# Patient Record
Sex: FEMALE | Race: WHITE | NOT HISPANIC OR LATINO | Employment: FULL TIME | URBAN - METROPOLITAN AREA
[De-identification: names, ages, dates, MRNs, and addresses within clinical notes are randomized per-mention and may not be internally consistent; named-entity substitution may affect disease eponyms.]

---

## 2017-10-20 PROCEDURE — 87340 HEPATITIS B SURFACE AG IA: CPT | Performed by: OBSTETRICS & GYNECOLOGY

## 2017-10-20 PROCEDURE — 87389 HIV-1 AG W/HIV-1&-2 AB AG IA: CPT | Performed by: OBSTETRICS & GYNECOLOGY

## 2017-10-20 PROCEDURE — 36415 COLL VENOUS BLD VENIPUNCTURE: CPT | Performed by: OBSTETRICS & GYNECOLOGY

## 2017-10-20 PROCEDURE — 86803 HEPATITIS C AB TEST: CPT | Performed by: OBSTETRICS & GYNECOLOGY

## 2017-10-20 PROCEDURE — 86780 TREPONEMA PALLIDUM: CPT | Performed by: OBSTETRICS & GYNECOLOGY

## 2017-10-21 LAB — T PALLIDUM IGG+IGM SER QL: NEGATIVE

## 2017-10-23 LAB
HBV SURFACE AG SERPL QL IA: NONREACTIVE
HCV AB SERPL QL IA: NONREACTIVE
HIV 1+2 AB+HIV1 P24 AG SERPL QL IA: NONREACTIVE

## 2017-12-27 ENCOUNTER — AMBULATORY - HEALTHEAST (OUTPATIENT)
Dept: MULTI SPECIALTY CLINIC | Facility: CLINIC | Age: 22
End: 2017-12-27

## 2017-12-27 LAB — PAP SMEAR - HIM PATIENT REPORTED: NORMAL

## 2018-04-29 ENCOUNTER — RECORDS - HEALTHEAST (OUTPATIENT)
Dept: ADMINISTRATIVE | Facility: OTHER | Age: 23
End: 2018-04-29

## 2018-05-14 ENCOUNTER — RECORDS - HEALTHEAST (OUTPATIENT)
Dept: ADMINISTRATIVE | Facility: OTHER | Age: 23
End: 2018-05-14

## 2018-08-08 ENCOUNTER — RECORDS - HEALTHEAST (OUTPATIENT)
Dept: ADMINISTRATIVE | Facility: OTHER | Age: 23
End: 2018-08-08

## 2018-12-20 ENCOUNTER — RECORDS - HEALTHEAST (OUTPATIENT)
Dept: ADMINISTRATIVE | Facility: OTHER | Age: 23
End: 2018-12-20

## 2018-12-29 ENCOUNTER — RECORDS - HEALTHEAST (OUTPATIENT)
Dept: ADMINISTRATIVE | Facility: OTHER | Age: 23
End: 2018-12-29

## 2019-04-15 ENCOUNTER — OFFICE VISIT - HEALTHEAST (OUTPATIENT)
Dept: FAMILY MEDICINE | Facility: CLINIC | Age: 24
End: 2019-04-15

## 2019-04-15 DIAGNOSIS — F41.8 OTHER SPECIFIED ANXIETY DISORDERS: ICD-10-CM

## 2019-04-15 DIAGNOSIS — K30 FUNCTIONAL DYSPEPSIA: ICD-10-CM

## 2019-04-15 DIAGNOSIS — K31.84 GASTROPARESIS: ICD-10-CM

## 2019-04-15 DIAGNOSIS — Z11.3 SCREEN FOR STD (SEXUALLY TRANSMITTED DISEASE): ICD-10-CM

## 2019-04-15 DIAGNOSIS — Z00.00 VISIT FOR PREVENTIVE HEALTH EXAMINATION: ICD-10-CM

## 2019-04-15 DIAGNOSIS — Z86.39 HISTORY OF IRON DEFICIENCY: ICD-10-CM

## 2019-04-15 DIAGNOSIS — K58.1 IRRITABLE BOWEL SYNDROME WITH CONSTIPATION: ICD-10-CM

## 2019-04-15 DIAGNOSIS — R11.0 NAUSEA: ICD-10-CM

## 2019-04-15 DIAGNOSIS — J45.21 MILD INTERMITTENT ASTHMA WITH ACUTE EXACERBATION: ICD-10-CM

## 2019-04-15 DIAGNOSIS — G43.009 MIGRAINE WITHOUT AURA AND WITHOUT STATUS MIGRAINOSUS, NOT INTRACTABLE: ICD-10-CM

## 2019-04-15 LAB
CLUE CELLS: NORMAL
TRICHOMONAS, WET PREP: NORMAL
YEAST, WET PREP: NORMAL

## 2019-04-15 RX ORDER — ONDANSETRON 8 MG/1
TABLET, FILM COATED ORAL
Qty: 180 TABLET | Refills: 0 | Status: SHIPPED | OUTPATIENT
Start: 2019-04-15 | End: 2024-02-01

## 2019-04-15 RX ORDER — VITAMIN B COMPLEX
1 CAPSULE ORAL DAILY
Status: ON HOLD | COMMUNITY
Start: 2019-04-15 | End: 2024-09-27

## 2019-04-15 ASSESSMENT — MIFFLIN-ST. JEOR: SCORE: 1146.43

## 2019-04-17 ENCOUNTER — RECORDS - HEALTHEAST (OUTPATIENT)
Dept: ADMINISTRATIVE | Facility: OTHER | Age: 24
End: 2019-04-17

## 2019-04-17 LAB
C TRACH DNA SPEC QL PROBE+SIG AMP: NEGATIVE
N GONORRHOEA DNA SPEC QL NAA+PROBE: NEGATIVE

## 2019-04-19 ENCOUNTER — COMMUNICATION - HEALTHEAST (OUTPATIENT)
Dept: FAMILY MEDICINE | Facility: CLINIC | Age: 24
End: 2019-04-19

## 2019-04-29 ENCOUNTER — OFFICE VISIT - HEALTHEAST (OUTPATIENT)
Dept: FAMILY MEDICINE | Facility: CLINIC | Age: 24
End: 2019-04-29

## 2019-04-29 DIAGNOSIS — Z86.39 HISTORY OF IRON DEFICIENCY: ICD-10-CM

## 2019-04-29 DIAGNOSIS — F41.8 OTHER SPECIFIED ANXIETY DISORDERS: ICD-10-CM

## 2019-04-29 DIAGNOSIS — Z00.00 VISIT FOR PREVENTIVE HEALTH EXAMINATION: ICD-10-CM

## 2019-04-29 DIAGNOSIS — Z79.899 CONTROLLED SUBSTANCE AGREEMENT SIGNED: ICD-10-CM

## 2019-04-29 LAB
AMPHETAMINES UR QL SCN: ABNORMAL
BARBITURATES UR QL: ABNORMAL
BENZODIAZ UR QL: ABNORMAL
CANNABINOIDS UR QL SCN: ABNORMAL
COCAINE UR QL: ABNORMAL
CREAT UR-MCNC: 163.2 MG/DL
HIV 1+2 AB+HIV1 P24 AG SERPL QL IA: NEGATIVE
METHADONE UR QL SCN: ABNORMAL
OPIATES UR QL SCN: ABNORMAL
OXYCODONE UR QL: ABNORMAL
PCP UR QL SCN: ABNORMAL

## 2019-04-30 LAB
BASOPHILS # BLD AUTO: NORMAL THOU/UL (ref 0–0.2)
BASOPHILS NFR BLD AUTO: NORMAL % (ref 0–2)
EOSINOPHIL # BLD AUTO: NORMAL THOU/UL (ref 0–0.4)
EOSINOPHIL NFR BLD AUTO: NORMAL % (ref 0–6)
ERYTHROCYTE [DISTWIDTH] IN BLOOD BY AUTOMATED COUNT: NORMAL % (ref 11–14.5)
HCT VFR BLD AUTO: NORMAL % (ref 35–47)
HGB BLD-MCNC: NORMAL G/DL (ref 12–16)
LYMPHOCYTES # BLD AUTO: NORMAL THOU/UL (ref 0.8–4.4)
LYMPHOCYTES NFR BLD AUTO: NORMAL % (ref 20–40)
MCH RBC QN AUTO: NORMAL PG (ref 27–34)
MCHC RBC AUTO-ENTMCNC: NORMAL G/DL (ref 32–36)
MCV RBC AUTO: NORMAL FL (ref 80–100)
MONOCYTES # BLD AUTO: NORMAL THOU/UL (ref 0–0.9)
MONOCYTES NFR BLD AUTO: NORMAL % (ref 2–10)
NEUTROPHILS # BLD AUTO: NORMAL THOU/UL (ref 2–7.7)
NEUTROPHILS NFR BLD AUTO: NORMAL % (ref 50–70)
PLATELET # BLD AUTO: NORMAL THOU/UL (ref 140–440)
PMV BLD AUTO: NORMAL FL (ref 7–10)
RBC # BLD AUTO: NORMAL MILL/UL (ref 3.8–5.4)
WBC: NORMAL THOU/UL (ref 4–11)

## 2019-05-13 ENCOUNTER — OFFICE VISIT - HEALTHEAST (OUTPATIENT)
Dept: FAMILY MEDICINE | Facility: CLINIC | Age: 24
End: 2019-05-13

## 2019-05-13 DIAGNOSIS — F41.8 OTHER SPECIFIED ANXIETY DISORDERS: ICD-10-CM

## 2019-05-13 DIAGNOSIS — Z91.89 AT RISK FOR INCREASED ANXIETY: ICD-10-CM

## 2019-05-13 LAB
ALBUMIN SERPL-MCNC: 4.2 G/DL (ref 3.5–5)
ALP SERPL-CCNC: 49 U/L (ref 45–120)
ALT SERPL W P-5'-P-CCNC: 10 U/L (ref 0–45)
ANION GAP SERPL CALCULATED.3IONS-SCNC: 9 MMOL/L (ref 5–18)
AST SERPL W P-5'-P-CCNC: 15 U/L (ref 0–40)
BILIRUB SERPL-MCNC: 0.4 MG/DL (ref 0–1)
BUN SERPL-MCNC: 7 MG/DL (ref 8–22)
CALCIUM SERPL-MCNC: 9.8 MG/DL (ref 8.5–10.5)
CHLORIDE BLD-SCNC: 106 MMOL/L (ref 98–107)
CO2 SERPL-SCNC: 24 MMOL/L (ref 22–31)
CREAT SERPL-MCNC: 0.77 MG/DL (ref 0.6–1.1)
GFR SERPL CREATININE-BSD FRML MDRD: >60 ML/MIN/1.73M2
GLUCOSE BLD-MCNC: 84 MG/DL (ref 70–125)
POTASSIUM BLD-SCNC: 4.6 MMOL/L (ref 3.5–5)
PROT SERPL-MCNC: 7 G/DL (ref 6–8)
SODIUM SERPL-SCNC: 139 MMOL/L (ref 136–145)
TSH SERPL DL<=0.005 MIU/L-ACNC: 2.81 UIU/ML (ref 0.3–5)

## 2019-05-24 ENCOUNTER — OFFICE VISIT - HEALTHEAST (OUTPATIENT)
Dept: FAMILY MEDICINE | Facility: CLINIC | Age: 24
End: 2019-05-24

## 2019-05-24 DIAGNOSIS — F41.8 OTHER SPECIFIED ANXIETY DISORDERS: ICD-10-CM

## 2019-05-24 RX ORDER — DULOXETIN HYDROCHLORIDE 20 MG/1
20 CAPSULE, DELAYED RELEASE ORAL 2 TIMES DAILY
Status: SHIPPED | COMMUNITY
Start: 2019-05-24 | End: 2023-12-05 | Stop reason: DRUGHIGH

## 2019-08-10 ENCOUNTER — COMMUNICATION - HEALTHEAST (OUTPATIENT)
Dept: FAMILY MEDICINE | Facility: CLINIC | Age: 24
End: 2019-08-10

## 2019-08-10 DIAGNOSIS — F41.8 OTHER SPECIFIED ANXIETY DISORDERS: ICD-10-CM

## 2019-08-12 ENCOUNTER — COMMUNICATION - HEALTHEAST (OUTPATIENT)
Dept: FAMILY MEDICINE | Facility: CLINIC | Age: 24
End: 2019-08-12

## 2019-08-12 DIAGNOSIS — F41.8 OTHER SPECIFIED ANXIETY DISORDERS: ICD-10-CM

## 2019-08-12 DIAGNOSIS — Z30.41 ORAL CONTRACEPTIVE PILL SURVEILLANCE: ICD-10-CM

## 2019-08-16 RX ORDER — BUPROPION HYDROCHLORIDE 150 MG/1
150 TABLET ORAL EVERY MORNING
Qty: 90 TABLET | Refills: 0 | Status: SHIPPED | OUTPATIENT
Start: 2019-08-16 | End: 2024-03-05

## 2019-12-06 ENCOUNTER — RECORDS - HEALTHEAST (OUTPATIENT)
Dept: ADMINISTRATIVE | Facility: OTHER | Age: 24
End: 2019-12-06

## 2019-12-31 ENCOUNTER — COMMUNICATION - HEALTHEAST (OUTPATIENT)
Dept: FAMILY MEDICINE | Facility: CLINIC | Age: 24
End: 2019-12-31

## 2019-12-31 DIAGNOSIS — Z30.41 ORAL CONTRACEPTIVE PILL SURVEILLANCE: ICD-10-CM

## 2020-01-17 ENCOUNTER — COMMUNICATION - HEALTHEAST (OUTPATIENT)
Dept: FAMILY MEDICINE | Facility: CLINIC | Age: 25
End: 2020-01-17

## 2020-01-17 DIAGNOSIS — K58.1 IRRITABLE BOWEL SYNDROME WITH CONSTIPATION: ICD-10-CM

## 2020-01-17 RX ORDER — POLYETHYLENE GLYCOL 3350 17 G/17G
17 POWDER, FOR SOLUTION ORAL DAILY PRN
Qty: 1,530 G | Refills: 1 | Status: ON HOLD | OUTPATIENT
Start: 2020-01-17 | End: 2024-09-27

## 2020-02-02 ENCOUNTER — COMMUNICATION - HEALTHEAST (OUTPATIENT)
Dept: FAMILY MEDICINE | Facility: CLINIC | Age: 25
End: 2020-02-02

## 2020-02-02 DIAGNOSIS — F41.8 OTHER SPECIFIED ANXIETY DISORDERS: ICD-10-CM

## 2020-02-03 RX ORDER — BUPROPION HYDROCHLORIDE 300 MG/1
TABLET ORAL
Qty: 90 TABLET | Refills: 3 | Status: SHIPPED | OUTPATIENT
Start: 2020-02-03 | End: 2024-03-05

## 2020-02-14 ENCOUNTER — OFFICE VISIT - HEALTHEAST (OUTPATIENT)
Dept: FAMILY MEDICINE | Facility: CLINIC | Age: 25
End: 2020-02-14

## 2020-02-14 DIAGNOSIS — N93.9 VAGINAL BLEEDING: ICD-10-CM

## 2020-02-14 DIAGNOSIS — R35.0 INCREASED URINARY FREQUENCY: ICD-10-CM

## 2020-02-14 DIAGNOSIS — K58.1 IRRITABLE BOWEL SYNDROME WITH CONSTIPATION: ICD-10-CM

## 2020-02-14 DIAGNOSIS — R10.2 SUPRAPUBIC ABDOMINAL PAIN: ICD-10-CM

## 2020-02-14 DIAGNOSIS — F32.1 MODERATE MAJOR DEPRESSION (H): ICD-10-CM

## 2020-02-14 DIAGNOSIS — K31.84 GASTROPARESIS: ICD-10-CM

## 2020-02-14 DIAGNOSIS — K30 FUNCTIONAL DYSPEPSIA: ICD-10-CM

## 2020-02-14 LAB
ALBUMIN UR-MCNC: NEGATIVE MG/DL
AMORPH CRY #/AREA URNS HPF: ABNORMAL /[HPF]
APPEARANCE UR: CLEAR
BACTERIA #/AREA URNS HPF: ABNORMAL HPF
BILIRUB UR QL STRIP: NEGATIVE
COLOR UR AUTO: YELLOW
GLUCOSE UR STRIP-MCNC: NEGATIVE MG/DL
HCG SERPL-ACNC: <2 MLU/ML (ref 0–4)
HGB UR QL STRIP: NEGATIVE
KETONES UR STRIP-MCNC: NEGATIVE MG/DL
LEUKOCYTE ESTERASE UR QL STRIP: ABNORMAL
NITRATE UR QL: NEGATIVE
PH UR STRIP: 7 [PH] (ref 5–8)
RBC #/AREA URNS AUTO: ABNORMAL HPF
SP GR UR STRIP: 1.02 (ref 1–1.03)
SQUAMOUS #/AREA URNS AUTO: ABNORMAL LPF
UROBILINOGEN UR STRIP-ACNC: ABNORMAL
WBC #/AREA URNS AUTO: ABNORMAL HPF

## 2020-02-14 RX ORDER — TRAZODONE HYDROCHLORIDE 50 MG/1
50-100 TABLET, FILM COATED ORAL
Status: SHIPPED | COMMUNITY
Start: 2020-02-14 | End: 2024-02-01

## 2020-02-14 RX ORDER — DEXTROAMPHETAMINE SACCHARATE, AMPHETAMINE ASPARTATE, DEXTROAMPHETAMINE SULFATE AND AMPHETAMINE SULFATE 5; 5; 5; 5 MG/1; MG/1; MG/1; MG/1
20 TABLET ORAL DAILY
Status: SHIPPED | COMMUNITY
Start: 2020-02-14 | End: 2022-04-11

## 2020-02-14 ASSESSMENT — ANXIETY QUESTIONNAIRES
1. FEELING NERVOUS, ANXIOUS, OR ON EDGE: NEARLY EVERY DAY
6. BECOMING EASILY ANNOYED OR IRRITABLE: NEARLY EVERY DAY
2. NOT BEING ABLE TO STOP OR CONTROL WORRYING: NEARLY EVERY DAY
5. BEING SO RESTLESS THAT IT IS HARD TO SIT STILL: NEARLY EVERY DAY
4. TROUBLE RELAXING: NEARLY EVERY DAY
7. FEELING AFRAID AS IF SOMETHING AWFUL MIGHT HAPPEN: MORE THAN HALF THE DAYS
IF YOU CHECKED OFF ANY PROBLEMS ON THIS QUESTIONNAIRE, HOW DIFFICULT HAVE THESE PROBLEMS MADE IT FOR YOU TO DO YOUR WORK, TAKE CARE OF THINGS AT HOME, OR GET ALONG WITH OTHER PEOPLE: VERY DIFFICULT
3. WORRYING TOO MUCH ABOUT DIFFERENT THINGS: NEARLY EVERY DAY
GAD7 TOTAL SCORE: 20

## 2020-02-14 ASSESSMENT — MIFFLIN-ST. JEOR: SCORE: 1199.14

## 2020-02-14 ASSESSMENT — PATIENT HEALTH QUESTIONNAIRE - PHQ9: SUM OF ALL RESPONSES TO PHQ QUESTIONS 1-9: 17

## 2020-02-15 LAB — BACTERIA SPEC CULT: NO GROWTH

## 2020-03-16 ENCOUNTER — COMMUNICATION - HEALTHEAST (OUTPATIENT)
Dept: FAMILY MEDICINE | Facility: CLINIC | Age: 25
End: 2020-03-16

## 2020-03-16 DIAGNOSIS — Z30.41 ORAL CONTRACEPTIVE PILL SURVEILLANCE: ICD-10-CM

## 2021-04-06 ENCOUNTER — COMMUNICATION - HEALTHEAST (OUTPATIENT)
Dept: FAMILY MEDICINE | Facility: CLINIC | Age: 26
End: 2021-04-06

## 2021-04-06 DIAGNOSIS — Z30.41 ORAL CONTRACEPTIVE PILL SURVEILLANCE: ICD-10-CM

## 2021-04-06 RX ORDER — NORETHINDRONE ACETATE AND ETHINYL ESTRADIOL, ETHINYL ESTRADIOL AND FERROUS FUMARATE 1MG-10(24)
1 KIT ORAL DAILY
Qty: 84 TABLET | Refills: 0 | Status: SHIPPED | OUTPATIENT
Start: 2021-04-06 | End: 2021-08-20

## 2021-05-27 ASSESSMENT — PATIENT HEALTH QUESTIONNAIRE - PHQ9: SUM OF ALL RESPONSES TO PHQ QUESTIONS 1-9: 17

## 2021-05-27 NOTE — PROGRESS NOTES
FEMALE ADULT PREVENTIVE EXAM      ASSESSMENT & PLAN  Brianna was seen today for annual exam.    Diagnoses and all orders for this visit:    Visit for preventive health examination  Routine history and physical, updated in EMR. Will obtain records from previous PCP in [facility] and review. Patient will return for fasting lab work in near future.   -     Cancel: HIV Antigen/Antibody Screening [place]  -     Cancel: Lipid [place] FASTING  -     Cancel: Comprehensive Metabolic Panel  -     Cancel: HM1(CBC and Differential)  -     Chlamydia trachomatis & Neisseria gonorrhoeae, Amplified Detection  -     Cancel: Syphilis Screen, [place]  -     Cancel: HM1 (CBC with Diff)  -     Comprehensive Metabolic Panel; Future  -     HIV Antigen/Antibody Screening [place]; Future  -     Syphilis Screen, [place]; Future  -     Wet Prep, Vaginal  -     Lipid [place] FASTING; Future    Mild intermittent asthma with acute exacerbation  Childhood. No longer bothersome to her    Functional dyspepsia  Gastroparesis  Irritable bowel syndrome with constipation  Has had EGD and colonoscopy dated 2011 per careeverywhere  - normal biopsies. Gastric emptying study in 2010 with borderline delayed gastric emptying and has been on Zofran 8 mg two times a day for years. She needs to establish care with GI. Gave her Zofran refill today but I'm weary of chronic zofran presription. Hopefully GI would give some advice.   -     Ambulatory referral to Gastroenterology  -     Comprehensive Metabolic Panel; Future    Other specified anxiety disorders  Has seen psych in the past but declines to see psych currently. Her previous PCP in [place] prescribed wellbutrin and Xanax for her. She needs to come back for Xanax discussion and CSA and drug screen in 2 weeks when her Xanax runs out.   -     buPROPion (WELLBUTRIN XL) 150 MG 24 hr tablet; Take 1 tablet (150 mg total) by mouth every morning.    Migraine without aura and without status migrainosus, not  intractable  Used to get Fioricet from [place]. Now takes Excedrin migraine.     Nausea  -     ondansetron (ZOFRAN) 8 MG tablet; take 1 tablet by oral route  twice daily  -     Ambulatory referral to Gastroenterology  -     Comprehensive Metabolic Panel; Future    History of iron deficiency  -     Ferritin; Future  -     Iron and Transferrin Iron Binding Capacity; Future  -     HM1(CBC and Differential); Future    Screen for STD (sexually transmitted disease)  -     Chlamydia trachomatis & Neisseria gonorrhoeae, Amplified Detection  -     Wet Prep, Vaginal        This note was created using Dragon dictation software, spelling errors may occur.    Radha Justin MD      CHIEF COMPLAINT:  Female preventive exam.    SUBJECTIVE:  Brianna Diaz is a 23 y.o. female who is new to me who is here for annual physical. She lives in the [facility] but traveled down to [place], [place] every 5 months to see her doctor there for medication refill. She has been getting Zofran, wellbutrin and xanax from Dr. Corey at [facility] in [place]. Her insurance now finally covers for her to see a doctor here in the [facility].  Her last pap smear is sep or oct of 2018. She hasn't seen psychiatry for many years. Used to be on Abilify but now anxiety is controlled with Wellbutrin and Xanax. Been using xanax 0.25 mg two times a day. Has seen GI in [place] but has yet to establish care with GI here in the [facility]. Not fasting today.     She last saw me Visit date not found.    She  has a past medical history of Anxiety and IBS (irritable bowel syndrome).    No results found for: WBC, HGB, HCT, MCV, PLT, NA, K, BUN  No results found for: CHOL, HDL, LDLCALC, TRIG  No results found for: TSH  BP Readings from Last 3 Encounters:   04/15/19 130/89       Surgeries:  History reviewed. No pertinent surgical history.    Family History:  Her family history is not on file. She was adopted.    Social History:  She  reports that she  has never smoked. She has never used smokeless tobacco. She reports that she drinks alcohol. She reports that she does not use drugs.   Social History     Social History Narrative    Engaged.     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 Works part time at [employer].       Medications:    Current Outpatient Medications:      ALPRAZolam (XANAX) 0.25 MG tablet, Take 0.25 mg by mouth 2 (two) times a day., Disp: , Rfl:      aspirin-acetaminophen-caffeine (EXCEDRIN MIGRAINE) 250-250-65 mg per tablet, take 1 tab as needed, Disp: , Rfl:      b complex vitamins capsule, Take 1 capsule by mouth daily., Disp: , Rfl:      buPROPion (WELLBUTRIN XL) 150 MG 24 hr tablet, Take 1 tablet (150 mg total) by mouth every morning., Disp: 90 tablet, Rfl: 0     buPROPion (WELLBUTRIN XL) 300 MG 24 hr tablet, Take 1 tablet by mouth., Disp: , Rfl:      FERROUS SULFATE ORAL, Take 1 tablet by mouth daily., Disp: , Rfl:      norethindrone-e.estradiol-iron (LO LOESTRIN FE) 1 mg-10 mcg (24)/10 mcg (2) Tab, Take 1 tablet by mouth., Disp: , Rfl:      ondansetron (ZOFRAN) 8 MG tablet, take 1 tablet by oral route  twice daily, Disp: 180 tablet, Rfl: 0     polyethylene glycol (MIRALAX) 17 gram/dose powder, Take by mouth., Disp: , Rfl:      ubidecarenone/vitamin E mixed (COENZYME Q10-VITAMIN E) 100 mg- 10 unit cap, take 1 capsule daily, Disp: , Rfl:   HELD MEDICATIONS: None.    Allergies:  No latex allergies.  Allergies   Allergen Reactions     Oxycodone-Acetaminophen Nausea Only     Any pain meds make her sick and cause nausea     Bactrim [Sulfamethoxazole-Trimethoprim] Rash       RISK BEHAVIOR & HEALTH HABITS  History of abnormal pap smear: No abnormal  Date of previous pap smear: Sep or Oct 2018.  Mammography: n/a.  Self Breast Exam: n/a.  Colon/Flex Sig: has had colonoscopies for work up of IBS and gastroparesis in the past.  DEXA: n/a.   Regular Exercise: yes.  Balanced diet: yes.  Seat Belt Use: yes.  Calcium intake/Osteoporosis prevention:  "no.  Sun Screen: YES  Dental Care: YES    REVIEW OF SYSTEMS:  Complete head to toe review of systems is otherwise negative except as above.    OBJECTIVE:  VITAL SIGNS:    /89 (Patient Site: Left Arm, Patient Position: Sitting, Cuff Size: Adult Regular)   Pulse 97   Resp 20   Ht 5' 0.63\" (1.54 m)   Wt 103 lb 9.6 oz (47 kg)   LMP  (LMP Unknown)   SpO2 97%   BMI 19.81 kg/m    GENERAL:  Patient alert, in no acute distress.  EYES: PERRLA. Extraoccular movements intact, pupils equal, reactive to light and accommodation.    ENT:  Hearing grossly normal.  Normal appearance to ears and nose. Normal lips, gums and teeth.    NECK:  Supple, without thyromegaly or mass.  RESP:  Clear to auscultation without crackles, wheezes or distress.  Normal respiratory effort.   CV:  Regular rate and rhythm without murmurs, rubs or gallops.  ABDOMEN:  Soft, non-tender, without hepatosplenomegaly, masses, or hernias.   BREASTS:  Nontender, without masses, nipple discharge, erythema, or axillary adenopathy.    :  Normal pelvic exam, including external genitalia with normal appearance and no lesions.  Normal vaginal exam, including no discharge and normal pelvic support, and no lesions.   Normal ureters, with no masses, tenerness or scarring.  Normal bladder, with no fullness, masses or tenderness.  Cervix well visualized, with normal appearance and no lesions or discharge.  Normal uterus, including normal size, shape, mobility with no tenderness.  Normal adnexa, including no nodules, masses or tenderness.  LYMPHATIC: Normal palpation of neck, groin and axilla..  No lymphadenopathy.  No bruising.  NEURO:  CN II-XII intact, motor & sensory function all intact.  DTR and reflexes normal.  PSYCHIATRIC:  Alert & oriented with normal mood and affect.  Good judgment and insight.  SKIN:  Normal inspection and palpation.  MUSCULOSKELETAL: Normal gait and station.  - Spine / Ribs / Pelvis: Normal inspection, ROM, stability and strength: " Spine, Head, Neck, Upper and Lower Extremities.

## 2021-05-28 ASSESSMENT — ANXIETY QUESTIONNAIRES: GAD7 TOTAL SCORE: 20

## 2021-05-28 ASSESSMENT — ASTHMA QUESTIONNAIRES: ACT_TOTALSCORE: 25

## 2021-05-28 NOTE — TELEPHONE ENCOUNTER
Patient Returning Call  Reason for call:  The patient returning call.   Information relayed to patient:  Information below relayed to patient  Patient has additional questions:  No  If YES, what are your questions/concerns:  NA  Okay to leave a detailed message?: No

## 2021-05-28 NOTE — PROGRESS NOTES
ASSESSMENT/ PLAN    1. Other specified anxiety disorders  Is on Wellbutrin 450 mg daily.  Has been on Xanax for a number of years.  Has gone to see psychiatry previously but her previous PCP has taken over prescribing bupropion and Xanax.  Discussed safe use of Xanax.  Discussed controlled substance.  Reviewed the  and it seems appropriate to me - she's been getting 60 tablets of Xanax 0.25 mg from Dr. Corey in [place] per .  She needs to be seen in clinic every 6 months for Xanax refilled.  Urine drug screen today.  Controlled substance agreement signed.  - Drug Abuse 1+, Urine  - ALPRAZolam (XANAX) 0.25 MG tablet; Take 1 tablet (0.25 mg total) by mouth 2 (two) times a day.  Dispense: 60 tablet; Refill: 0    2. Controlled substance agreement signed - 4/29/2019, 60 tablets of Xanax 0.25 mg monthly, office visit every 6 months      3. Visit for preventive health examination  - HIV Antigen/Antibody Screening [place]      Greater than 25 minutes was spent today with patient ,more than 50% of time was counseling and coordination of care on the above issues      This note was created using Dragon dictation software, spelling errors may occur.     Radha Justin MD        SUBJECTIVE   Brianna SHARI Diaz is a 23 y.o. old female who presented to clinic today for further evaluation of her anxiety depression.  She has been taking Xanax 0.25 mg twice daily for a few years.  She was previously seen by physician in [facility] but recently transferred care here to me.  She would like refill of Xanax.  No currently suicidal ideation.    Review of Systems:  Negative except as noted in HPI      The following portions of the patient's history were reviewed and updated as appropriate: past medical history, past surgical history, family history, allergies, current medications and problem list.    Medical History  Active Ambulatory (Non-Hospital) Problems    Diagnosis     Controlled substance agreement signed - 4/29/2019,  60 tablets of Xanax 0.25 mg monthly, office visit every 6 months     Functional dyspepsia     Gastroparesis     Migraine     Other specified anxiety disorders     Nausea     Asthma     Irritable bowel syndrome with constipation     Past Medical History:   Diagnosis Date     Anxiety      IBS (irritable bowel syndrome)        Surgical History  She  has no past surgical history on file.    Social History  Reviewed, and she  reports that she has never smoked. She has never used smokeless tobacco. She reports that she drinks alcohol. She reports that she does not use drugs.    Family History  Reviewed, and family history is not on file. She was adopted.    Medications  Reviewed and reconciled    Allergies  Allergies   Allergen Reactions     Aspirin      Drowsy, blurry vision     Oxycodone-Acetaminophen Nausea Only     Any pain meds make her sick and cause nausea     Trimethoprim Hives     Sulfamethoxazole Rash     Sulfamethoxazole-Trimethoprim Rash and Hives         OBJECTIVE  Physical Exam:  Vital signs: /78 (Patient Site: Left Arm, Patient Position: Sitting, Cuff Size: Adult Regular)   Pulse (!) 102   Temp 98.6  F (37  C) (Oral)   Wt 107 lb (48.5 kg)   LMP  (LMP Unknown)   BMI 20.46 kg/m    Weight: 107 lb (48.5 kg)    General appearance: pleasant, appears stated age, cooperative and in no distress  Neuro: alert oriented x 3, grossly normal otherwise  Psych: anxious affect, appropriate conversation

## 2021-05-28 NOTE — PROGRESS NOTES
ASSESSMENT/ PLAN    1. Other specified anxiety disorders  - Ambulatory referral to Psychiatry  - Thyroid [place]  - Comprehensive Metabolic Panel  - ALPRAZolam (XANAX) 0.25 MG tablet; Take 1 tablet (0.25 mg total) by mouth 3 (three) times a day.  Dispense: 90 tablet; Refill: 0  - Ambulatory referral to Psychology    2. At risk for increased anxiety  - Thyroid [place]  - Comprehensive Metabolic Panel  - Ambulatory referral to Psychology      23-year-old female with a history of gastroparesis, IBS and anxiety who is here for evaluation of acute worsening of her anxiety for the last 2 weeks.  She recently found out that both of her biological parents had bipolar so she is very worried about bipolar.  She describes racing thoughts and agitation and irritation today however I do not think she is acutely manic per my clinical evaluation.  I will check lab work today including thyroid and CMP to make sure there is no metabolic issue going on.  She has been on Wellbutrin 450 daily as well as Xanax 0.5 mg in total daily for quite a long time.  I think she should be seen by psychology psychiatry as soon as possible.  I will increase her Xanax to 0.253 times daily.  She needs to come back to see me in 2 weeks.  I also gave her a letter for school stating that she can be allowed more time to finish her classes over the summer as well as a letter for work stating that she should remain out of work for the next 2 weeks and she needs to follow-up with me in 2 weeks prior to going back to work.  Patient is safe to herself, no acute suicidal ideation.  I do recommend that if she gets worse, she can present to the emergency room for further evaluation but at this point I do not think that she is acutely manic. She has excellent insight and had a normal conversation with me today. Patient agree with plan.        This note was created using Dragon dictation software, spelling errors may occur.     Radha Justin MD        SUBJECTIVE    Brianna Diaz is a 23 y.o. old female who presented to clinic today for further evaluation of increased anxiety.  She has been diagnosed with anxiety ever since high school.  She has been on stable dose of Wellbutrin 450 mg daily as well as Xanax 0.5 mg in total daily.  I saw her about 2 weeks ago and at that time her anxiety was stable.  However today she tells me that over the last 2 weeks her anxiety just significantly increased.  I think the exacerbating factors including finding out that her biological parents both had bipolar.  She is worried that she has bipolar.  She reports that sleeping well however the pattern is not sleeping well one night and then the next night she would sleep a lot.  She denies seeing hallucinations.  She has been journaling about her symptoms.  She is planning a wedding as well and she thinks that planning a wedding actually helps her anxiety feel better.  She reports panic attacks a couple times a day.  Coloring has been helping her.  She has not seen a psychiatrist for a long time as her previous PCP that she has been following in [place] has been prescribing her antidepressant medications.  Her ALY 7 score is 18 and PHQ 9 score is also 18 today.  She feels that she has racing thought, very distracted, more agitated, and a lot of thoughts going inside her mind.  She is easily stressed and upset and irritated. She feels more twitching and jerking movements and she feels that her sentences do not always make sense.  She feels that she has a hard time communicating.  Her fiancé who lives with her thinks that she might have Tourette's but she thinks that it is just really bad anxiety although she is worried about bipolar.  She would like a letter from me stating that she is a lot more time to finish her classes over the summer as well as a work letter.  She is worried about going to work.  She denies using marijuana anymore or any other drug.  She does drink some alcohol but maybe 1  glass of wine a week.  Denies using any other drug.  I did a urine drug screen about 2 weeks ago that was positive for marijuana.  She does not have any financial stress.    N/A    Review of Systems:  Negative except as noted in HPI    The following portions of the patient's history were reviewed and updated as appropriate: past medical history, past surgical history, family history, allergies, current medications and problem list.    Medical History  Active Ambulatory (Non-Hospital) Problems    Diagnosis     Controlled substance agreement signed - 4/29/2019, 60 tablets of Xanax 0.25 mg monthly, office visit every 6 months     Functional dyspepsia     Gastroparesis     Migraine     Other specified anxiety disorders     Nausea     Asthma     Irritable bowel syndrome with constipation     Past Medical History:   Diagnosis Date     Anxiety      IBS (irritable bowel syndrome)        Surgical History  She  has no past surgical history on file.    Social History  Reviewed, and she  reports that she has never smoked. She has never used smokeless tobacco. She reports that she drinks alcohol. She reports that she does not use drugs.    Family History  Reviewed, and family history is not on file. She was adopted.    Medications  Reviewed and reconciled    Allergies  Allergies   Allergen Reactions     Aspirin      Drowsy, blurry vision     Oxycodone-Acetaminophen Nausea Only     Any pain meds make her sick and cause nausea     Trimethoprim Hives     Sulfamethoxazole Rash     Sulfamethoxazole-Trimethoprim Rash and Hives         OBJECTIVE  Physical Exam:  Vital signs: /90 (Patient Site: Left Arm, Patient Position: Sitting, Cuff Size: Adult Regular)   Pulse (!) 110   Resp 20   Wt 106 lb 6.4 oz (48.3 kg)   LMP  (LMP Unknown)   SpO2 97%   BMI 20.35 kg/m    Weight: 106 lb 6.4 oz (48.3 kg)    General appearance: pleasant, appears stated age, cooperative and in no distress  Neuro: alert oriented x 3, grossly normal  otherwise  Psych: anxious affect, appropriate conversation, mood and affect congruent, speech not pressured, doesn't seem to have any hallucinations, casually dressed, insight excellent.

## 2021-05-28 NOTE — TELEPHONE ENCOUNTER
Left message to call back for: results  Information to relay to patient:  See note from provider below

## 2021-05-28 NOTE — TELEPHONE ENCOUNTER
----- Message from Radha Justin MD sent at 4/18/2019  5:16 PM CDT -----  Please call patient  Testing for chlamydia and gonorrhea is negative.

## 2021-05-29 NOTE — PROGRESS NOTES
ASSESSMENT:  1. Other specified anxiety disorders  ALPRAZolam (XANAX) 0.25 MG tablet       PLAN:  Refill provided today, patient had trouble filling last prescription, has seen psychiatry and likely psychiatry will eventually take over filling. Recommended twice daily dosing so we will continue for now. Follow up as needed. Next psychiatry appointment in 4 weeks.   No problem-specific Assessment & Plan notes found for this encounter.      There are no Patient Instructions on file for this visit.    No orders of the defined types were placed in this encounter.    Medications Discontinued During This Encounter   Medication Reason     ALPRAZolam (XANAX) 0.25 MG tablet Reorder       No follow-ups on file.    CHIEF COMPLAINT:  Chief Complaint   Patient presents with     Medication Management     f/u on anxiety       HISTORY OF PRESENT ILLNESS:  Brianna is a 23 y.o. female here today for follow up on anxiety. Has been working with Dr. Justin who referred to psychiatry. Recently saw them last week and they adjusted medications. Since they are not prescribing xanax yet and her refill wouldn't go through last time she needs refill today. Psychiatry recommended twice daily dosing. Mood is doing slightly better, since new medication has just started she doesn't think they are in effect yet.    REVIEW OF SYSTEMS:        All other systems are negative  PFSH:  Reviewed, no changes      TOBACCO USE:  Social History     Tobacco Use   Smoking Status Never Smoker   Smokeless Tobacco Never Used       VITALS:  Vitals:    05/24/19 1417   BP: (!) 120/94   Patient Site: Left Arm   Patient Position: Sitting   Cuff Size: Adult Regular   Pulse: (!) 104   Resp: 16   Temp: 99  F (37.2  C)   TempSrc: Oral   Weight: 101 lb 12.8 oz (46.2 kg)     Wt Readings from Last 3 Encounters:   05/24/19 101 lb 12.8 oz (46.2 kg)   05/13/19 106 lb 6.4 oz (48.3 kg)   04/29/19 107 lb (48.5 kg)       PHYSICAL EXAM:   BP (!) 120/94 (Patient Site: Left Arm,  Patient Position: Sitting, Cuff Size: Adult Regular)   Pulse (!) 104   Temp 99  F (37.2  C) (Oral)   Resp 16   Wt 101 lb 12.8 oz (46.2 kg)   BMI 19.47 kg/m    General appearance: alert, appears stated age and cooperative  Lungs: clear to auscultation bilaterally  Heart: regular rate and rhythm, S1, S2 normal, no murmur, click, rub or gallop  Neurologic: Grossly normal  Psychologic: Mood and affect normal.      DATA REVIEWED:  Additional History from Old Records Summarized (2): 0  Decision to Obtain Records (1): 0  Radiology Tests Summarized or Ordered (1): 0  Labs Reviewed or Ordered (1): 0  Medicine Test Summarized or Ordered (1): 0  Independent Review of EKG or X-RAY(2 each): 0    The visit lasted a total of 20 minutes face to face with the patient. Over 50% of the time was spent counseling and educating the patient about plan of care.    MEDICATIONS:  Current Outpatient Medications   Medication Sig Dispense Refill     acetaminophen-caffeine (EXCEDRIN) 500-65 mg Tab per tablet Take 2 tablets by mouth every 6 (six) hours as needed (a couple times a week).       [START ON 5/27/2019] ALPRAZolam (XANAX) 0.25 MG tablet Take 1 tablet (0.25 mg total) by mouth 2 (two) times a day. 60 tablet 0     b complex vitamins capsule Take 1 capsule by mouth daily.       buPROPion (WELLBUTRIN XL) 150 MG 24 hr tablet Take 1 tablet (150 mg total) by mouth every morning. 90 tablet 0     buPROPion (WELLBUTRIN XL) 300 MG 24 hr tablet Take 1 tablet by mouth.       DULoxetine (CYMBALTA) 20 MG capsule Take 20 mg by mouth daily.       FERROUS SULFATE ORAL Take 1 tablet by mouth daily.       norethindrone-e.estradiol-iron (LO LOESTRIN FE) 1 mg-10 mcg (24)/10 mcg (2) Tab Take 1 tablet by mouth.       ondansetron (ZOFRAN) 8 MG tablet take 1 tablet by oral route  twice daily (Patient taking differently: Take 8 mg by mouth 4 (four) times a week. take 1 tablet by oral route  twice daily      ) 180 tablet 0     polyethylene glycol (MIRALAX) 17  gram/dose powder Take by mouth.       ubidecarenone/vitamin E mixed (COENZYME Q10-VITAMIN E) 100 mg- 10 unit cap take 1 capsule daily       No current facility-administered medications for this visit.        This note has been dictated using voice recognition software. Any grammatical or context distortions are unintentional and inherent to the software

## 2021-05-31 NOTE — TELEPHONE ENCOUNTER
There is no note from 8/10/19/ Marleny Sands's last note from May said that patient would be seeing psychiatry and that they would be taking over rx.     Please get more info from patient.

## 2021-05-31 NOTE — TELEPHONE ENCOUNTER
Patient Returning Call  Reason for call:  Returning phone call  Information relayed to patient:  Below message relayed to patient.  Patient states she was under the impression her primary care physician was going to continue to prescribe her Bupropion, and her Mental Health provider would be filling her Xanax. Patient also requesting a refill for her birth control. Please advise.   Patient has additional questions:  Yes  If YES, what are your questions/concerns:  Please see the above message.  Okay to leave a detailed message?: Yes

## 2021-05-31 NOTE — TELEPHONE ENCOUNTER
Both Bupropion and BCP prescriptions were T'D per Dr Tellez request and sent to covering provider for approval.  DEVIN SheppardA

## 2021-05-31 NOTE — TELEPHONE ENCOUNTER
Refill Approved    Rx renewed per Medication Renewal Policy. Medication was last renewed on 4/15/19 .    Ryann Serrano, [facility] Triage/Med Refill 8/10/2019     Requested Prescriptions   Pending Prescriptions Disp Refills     buPROPion (WELLBUTRIN XL) 150 MG 24 hr tablet 90 tablet 0     Sig: Take 1 tablet (150 mg total) by mouth every morning.       Tricyclics/Misc Antidepressant/Antianxiety Meds Refill Protocol Passed - 8/10/2019 12:03 PM        Passed - PCP or prescribing provider visit in last year     Last office visit with prescriber/PCP: 5/13/2019 Radha Justin MD OR same dept: 5/24/2019 Marleny Sands FNP OR same specialty: 5/24/2019 Marleny Sands FNP  Last physical: 4/15/2019 Last MTM visit: Visit date not found   Next visit within 3 mo: Visit date not found  Next physical within 3 mo: Visit date not found  Prescriber OR PCP: Radha Justin MD  Last diagnosis associated with med order: 1. Other specified anxiety disorders  - buPROPion (WELLBUTRIN XL) 150 MG 24 hr tablet; Take 1 tablet (150 mg total) by mouth every morning.  Dispense: 90 tablet; Refill: 0    If protocol passes may refill for 12 months if within 3 months of last provider visit (or a total of 15 months).

## 2021-05-31 NOTE — TELEPHONE ENCOUNTER
LM for Pt at 12:47 pm, looking for clarification on what the doseage is she is taking for the Bupropion. Left direct number.  Desirae Sheridan, DEVINA

## 2021-05-31 NOTE — TELEPHONE ENCOUNTER
Please T up a refill for both of the bupropion prescriptions for #90 no refill.  Looks like a physical will be due next April.  Please also T at birth control refill.  Thank you.

## 2021-05-31 NOTE — TELEPHONE ENCOUNTER
ROBERT for Pt at 5:18 pm, relayed that both prescriptions for the Bupropion were approved and sent to her preferred pharmacy.  DEVIN SheppardA

## 2021-05-31 NOTE — TELEPHONE ENCOUNTER
Medication Requestgener  Medication name: bupropion 300 mg 24 hr  and  generic Mirlax powder.  Pharmacy Name and Location: [facility]  Reason for request: Patient is asking for these as she thought it was being reordered in last visit.  Yes she does take both of the bupropion orders.   When did you use medication last?:  today  Patient offered appointment:  just seen 8/10/19  Okay to leave a detailed message: yes

## 2021-06-03 VITALS — WEIGHT: 106.4 LBS | BODY MASS INDEX: 20.35 KG/M2

## 2021-06-03 VITALS — WEIGHT: 107 LBS | BODY MASS INDEX: 20.46 KG/M2

## 2021-06-03 VITALS — WEIGHT: 103.6 LBS | BODY MASS INDEX: 19.56 KG/M2 | HEIGHT: 61 IN

## 2021-06-03 VITALS — WEIGHT: 101.8 LBS | BODY MASS INDEX: 19.47 KG/M2

## 2021-06-04 VITALS
TEMPERATURE: 98.6 F | HEIGHT: 61 IN | BODY MASS INDEX: 21.67 KG/M2 | WEIGHT: 114.8 LBS | SYSTOLIC BLOOD PRESSURE: 122 MMHG | DIASTOLIC BLOOD PRESSURE: 84 MMHG | HEART RATE: 101 BPM

## 2021-06-04 NOTE — TELEPHONE ENCOUNTER
Refill Approved    Rx renewed per Medication Renewal Policy. Medication was last renewed on 8/16/19.    Vivian Boyle, [facility] [place] [facility] Triage/Med Refill 1/3/2020     Requested Prescriptions   Pending Prescriptions Disp Refills     LO LOESTRIN FE 1 mg-10 mcg (24)/10 mcg (2) Tab [Pharmacy Med Name: LO LOESTRIN FE 1-10 TABLET] 84 tablet 0     Sig: TAKE 1 TABLET BY MOUTH DAILY AT 8:00 AM.       Oral Contraceptives Protocol Passed - 12/31/2019  8:40 PM        Passed - Visit with PCP or prescribing provider visit in last 12 months      Last office visit with prescriber/PCP: Visit date not found OR same dept: 5/24/2019 Marleny Sands FNP OR same specialty: 5/24/2019 Marleny Sands FNP  Last physical: Visit date not found Last MTM visit: Visit date not found   Next visit within 3 mo: Visit date not found  Next physical within 3 mo: Visit date not found  Prescriber OR PCP: Tereza Alexis MD  Last diagnosis associated with med order: 1. Oral contraceptive pill surveillance  - LO LOESTRIN FE 1 mg-10 mcg (24)/10 mcg (2) Tab [Pharmacy Med Name: LO LOESTRIN FE 1-10 TABLET]; TAKE 1 TABLET BY MOUTH DAILY AT 8:00 AM.  Dispense: 84 tablet; Refill: 0    If protocol passes may refill for 12 months if within 3 months of last provider visit (or a total of 15 months).

## 2021-06-05 NOTE — TELEPHONE ENCOUNTER
Refill Approved    Rx renewed per Medication Renewal Policy. Medication was last renewed on 8/16/2019 for 90/0  Last OV 5/24/2019   RTC about 6/21/2019   Has OV 2/14/2020  Karina Rajan, [facility] Triage/Med Refill 2/3/2020     Requested Prescriptions   Pending Prescriptions Disp Refills     buPROPion (WELLBUTRIN XL) 300 MG 24 hr tablet [Pharmacy Med Name: BUPROPION HCL  MG TABLET] 90 tablet 0     Sig: TAKE 1 TABLET BY MOUTH EVERY DAY IN THE MORNING       Tricyclics/Misc Antidepressant/Antianxiety Meds Refill Protocol Passed - 2/2/2020  1:37 PM        Passed - PCP or prescribing provider visit in last year     Last office visit with prescriber/PCP: Visit date not found OR same dept: 5/24/2019 Marleny Sands FNP OR same specialty: 5/24/2019 Marleny Sands FNP  Last physical: Visit date not found Last MTM visit: Visit date not found   Next visit within 3 mo: Visit date not found  Next physical within 3 mo: Visit date not found  Prescriber OR PCP: Tereza Alexis MD  Last diagnosis associated with med order: 1. Other specified anxiety disorders  - buPROPion (WELLBUTRIN XL) 300 MG 24 hr tablet [Pharmacy Med Name: BUPROPION HCL  MG TABLET]; TAKE 1 TABLET BY MOUTH EVERY DAY IN THE MORNING  Dispense: 90 tablet; Refill: 0    If protocol passes may refill for 12 months if within 3 months of last provider visit (or a total of 15 months).

## 2021-06-05 NOTE — TELEPHONE ENCOUNTER
Medication Request  Medication name:   polyethylene glycol (MIRALAX) 17 gram/dose powder     No   Sig - Route: Take by mouth. - Oral   Class: Historical Med     Requested Pharmacy: [facility]  Reason for request: Refill requested for above medication, medication is listed as historical.   When did you use medication last?:  Last fill 11.12.2019  Patient offered appointment:  N/A - electronic request  Okay to leave a detailed message: yes

## 2021-06-06 NOTE — TELEPHONE ENCOUNTER
Refill Request  Did you contact pharmacy: No  Medication name:   Requested Prescriptions     Pending Prescriptions Disp Refills     norethindrone-e.estradioL-iron (LO LOESTRIN FE) 1 mg-10 mcg (24)/10 mcg (2) Tab 84 tablet 1     Sig: Take 1 tablet by mouth daily.     Who prescribed the medication: Radha Justin MD    Requested Pharmacy: [facility]  Is patient out of medication: Yes  Patient notified refills processed in 3 business days:  yes  Okay to leave a detailed message: yes

## 2021-06-06 NOTE — TELEPHONE ENCOUNTER
Refill Approved    Rx renewed per Medication Renewal Policy. Medication was last renewed on 1/3/20.    Vivian Boyle, [facility] Triage/Med Refill 3/16/2020     Requested Prescriptions   Pending Prescriptions Disp Refills     norethindrone-e.estradioL-iron (LO LOESTRIN FE) 1 mg-10 mcg (24)/10 mcg (2) Tab 84 tablet 1     Sig: Take 1 tablet by mouth daily.       Oral Contraceptives Protocol Passed - 3/16/2020  2:04 PM        Passed - Visit with PCP or prescribing provider visit in last 12 months      Last office visit with prescriber/PCP: 2/14/2020 Radha Justin MD OR same dept: 2/14/2020 Radha Justin MD OR same specialty: 2/14/2020 Radha Justin MD  Last physical: 4/15/2019 Last MTM visit: Visit date not found   Next visit within 3 mo: Visit date not found  Next physical within 3 mo: Visit date not found  Prescriber OR PCP: Radha Justin MD  Last diagnosis associated with med order: 1. Oral contraceptive pill surveillance  - norethindrone-e.estradioL-iron (LO LOESTRIN FE) 1 mg-10 mcg (24)/10 mcg (2) Tab; Take 1 tablet by mouth daily.  Dispense: 84 tablet; Refill: 1    If protocol passes may refill for 12 months if within 3 months of last provider visit (or a total of 15 months).

## 2021-06-06 NOTE — PROGRESS NOTES
ASSESSMENT/ PLAN    Brianna was seen today for vaginal bleeding.    Vaginal bleeding  Lasted 3 weeks beginning in mid January lasting up to the beginning of February.  Symptoms have now resolved.  I will go ahead and check a serum beta-hCG as well as ultrasound. Patient believes she had a miscarriage however I suspect it could be due to ocp side effects.  Patient and  do not wish to start a family at this point and she will continue on OCP.  -     Beta-hCG, Quantitative  -     US OB < 14 Weeks  -     Urinalysis-UC if Indicated  -     Culture, Urine    Irritable bowel syndrome with constipation  Functional dyspepsia  Gastroparesis  Has not seen GI yet. Another referral placed. She's been taking zofran long term.   -     Ambulatory referral to Gastroenterology    Increased urinary frequency  Suprapubic abdominal pain  Started during bleeding episode described above. Will check UA/UC and treat accordingly.   -     Urinalysis-UC if Indicated  -     Culture, Urine    Moderate major depression (H)  Has been seeing psychiatry.  No longer on benzodiazepine.  Stable      This note was created using Dragon dictation software, spelling errors may occur.     Radha Justin MD        SUBJECTIVE   Brianna Diaz is a 24 y.o. old female who presented to clinic today for further evaluation of increased and prolonged bleeding accompanied by abdominal cramps lasting about 3 week starting in mid January and beginning of February but has now resolved.  Patient reports that she passed large blood clots.  She had incredible abdominal cramps.  She did not miss any of her oral birth control pills and continue to take her birth control pills throughout this whole period.  Patient believes that she had a miscarriage.  She and her  are not planning on having any children at this time.  I last saw her 5/2019 and since then she has seen psychiatry who took her off of benzodiazepine and been managing her psychiatric condition.   "She has not seen gastroenterology for gastroparesis and functional dyspepsia and still taking Zofran.  I cautioned her about taking Zofran long-term.  Patient would like another referral to go see gastroenterology.    Review of Systems:  Negative except as noted in HPI    The following portions of the patient's history were reviewed and updated as appropriate: past medical history, past surgical history, family history, allergies, current medications and problem list.    Medical History  Active Ambulatory (Non-Hospital) Problems    Diagnosis     Functional dyspepsia     Gastroparesis     Migraine     Other specified anxiety disorders     Nausea     Asthma     Irritable bowel syndrome with constipation     Past Medical History:   Diagnosis Date     Anxiety      Controlled substance agreement signed - 4/29/2019, 60 tablets of Xanax 0.25 mg monthly, office visit every 6 months 4/29/2019     IBS (irritable bowel syndrome)        Surgical History  She  has no past surgical history on file.    Social History  Reviewed, and she  reports that she has never smoked. She has never used smokeless tobacco. She reports current alcohol use. She reports that she does not use drugs.    Family History  Reviewed, and family history is not on file. She was adopted.    Medications  Reviewed and reconciled    Allergies  Allergies   Allergen Reactions     Aspirin      Drowsy, blurry vision     Oxycodone-Acetaminophen Nausea Only     Any pain meds make her sick and cause nausea     Trimethoprim Hives     Sulfamethoxazole Rash     Sulfamethoxazole-Trimethoprim Rash and Hives         OBJECTIVE  Physical Exam:  Vital signs: /84 (Patient Site: Left Arm, Patient Position: Sitting, Cuff Size: Adult Regular)   Pulse (!) 101   Temp 98.6  F (37  C) (Oral)   Ht 5' 0.75\" (1.543 m)   Wt 114 lb 12.8 oz (52.1 kg)   BMI 21.87 kg/m    Weight: 114 lb 12.8 oz (52.1 kg)    General appearance: pleasant, appears stated age, cooperative and in no " distress  Eyes: EOMs intact, PERRL, conjunctivae normal.   ENT: moist oral mucosa, posterior oropharynx normal.   Lymph: no cervical/supraclavicular adenopathy  Respiratory: clear to auscultation bilaterally, good air movement throughout, no wheezing or crackles, speaking full sentences without difficulty  Cardiovascular: regular rate and rhythm, no murmur appreciated, no leg edema  Abdomen: active bowel sounds, soft, mildly tender in the suprapubic region, non-distended, no CVA tenderness bilaterally.   Skin: no rashes  Neuro: alert oriented x 3, grossly normal otherwise  Psych: normal affect, appropriate conversation

## 2021-06-16 PROBLEM — K31.84 GASTROPARESIS: Status: ACTIVE | Noted: 2019-04-15

## 2021-06-16 PROBLEM — K30 FUNCTIONAL DYSPEPSIA: Status: ACTIVE | Noted: 2019-04-15

## 2021-06-16 PROBLEM — J45.909 ASTHMA: Status: ACTIVE | Noted: 2018-09-19

## 2021-06-16 PROBLEM — F32.1 MODERATE MAJOR DEPRESSION (H): Status: ACTIVE | Noted: 2020-02-15

## 2021-06-16 PROBLEM — F41.8 OTHER SPECIFIED ANXIETY DISORDERS: Status: ACTIVE | Noted: 2019-04-15

## 2021-06-16 PROBLEM — G43.909 MIGRAINE: Status: ACTIVE | Noted: 2019-04-15

## 2021-06-16 PROBLEM — R11.0 NAUSEA: Status: ACTIVE | Noted: 2019-04-15

## 2021-06-16 NOTE — TELEPHONE ENCOUNTER
Requested Prescriptions     Pending Prescriptions Disp Refills     norethindrone-e.estradioL-iron (LO LOESTRIN FE) 1 mg-10 mcg (24)/10 mcg (2) Tab 84 tablet 3     Sig: Take 1 tablet by mouth daily.

## 2021-06-16 NOTE — TELEPHONE ENCOUNTER
RN cannot approve Refill Request    RN can NOT refill this medication PCP messaged that patient is overdue for Office Visit. Last office visit: 2/14/2020 Radha Justin MD Last Physical: 4/15/2019 Last MTM visit: Visit date not found Last visit same specialty: 2/14/2020 Radha Justin MD.  Next visit within 3 mo: Visit date not found  Next physical within 3 mo: Visit date not found      Carito Jones, [facility] Triage/Med Refill 4/6/2021    Requested Prescriptions   Pending Prescriptions Disp Refills     norethindrone-e.estradioL-iron (LO LOESTRIN FE) 1 mg-10 mcg (24)/10 mcg (2) Tab 84 tablet 3     Sig: Take 1 tablet by mouth daily.       Oral Contraceptives Protocol Failed - 4/6/2021  9:32 AM        Failed - Visit with PCP or prescribing provider visit in last 12 months      Last office visit with prescriber/PCP: 2/14/2020 Radha Justin MD OR same dept: Visit date not found OR same specialty: 2/14/2020 Radha Justin MD  Last physical: 4/15/2019 Last MTM visit: Visit date not found   Next visit within 3 mo: Visit date not found  Next physical within 3 mo: Visit date not found  Prescriber OR PCP: Radha Justin MD  Last diagnosis associated with med order: 1. Oral contraceptive pill surveillance  - norethindrone-e.estradioL-iron (LO LOESTRIN FE) 1 mg-10 mcg (24)/10 mcg (2) Tab; Take 1 tablet by mouth daily.  Dispense: 84 tablet; Refill: 3    If protocol passes may refill for 12 months if within 3 months of last provider visit (or a total of 15 months).

## 2021-06-19 NOTE — LETTER
Letter by Radha Justin MD at      Author: Radha Justin MD Service: -- Author Type: --    Filed:  Encounter Date: 5/13/2019 Status: (Other)         May 13, 2019     Patient: Brianna Diaz   YOB: 1995   Date of Visit: 5/13/2019       To Whom It May Concern:    It is my medical opinion that Brianna Diaz should remain out of work until 2 weeks from now.    If you have any questions or concerns, please don't hesitate to call.    Sincerely,        Electronically signed by Radha Justin MD

## 2021-06-19 NOTE — LETTER
Letter by Radha Justin MD at      Author: Radha Justin MD Service: -- Author Type: --    Filed:  Encounter Date: 4/29/2019 Status: (Other)         [facility] MEDICINE/OB  04/29/19    Patient: Brianna Diaz  YOB: 1995  Medical Record Number: [id number]  CSN: [id number]                                                                              Non-opioid Controlled Substance Agreement    I understand that my care provider has prescribed a controlled substance to help manage my condition(s). I am taking this medicine to help me function or work. I know this is strong medicine, and that it can cause serious side effects. Controlled substances can be sedating, addicting and may cause a dependency on the drug. They can affect my ability to drive or think, and cause depression. They need to be taken exactly as prescribed. Combining controlled substances with certain medicines or chemicals (such as cocaine, sedatives and tranquilizers, sleeping pills, meth) can be dangerous or even fatal. Also, if I stop controlled substances suddenly, I may have severe withdrawal symptoms.  If not helpful, I may be asked to stop them.    The risks, benefits, and side effects of these medicine(s) were explained to me. I agree that:    1. I will take part in other treatments as advised by my care team. This may be psychiatry or counseling, physical therapy, behavioral therapy, group treatment or a referral to a pain clinic. I will reduce or stop my medicine when my care team tells me to do so.  2. I will take my medicines as prescribed. I will not change the dose or schedule unless my care team tells me to. There will be no refills if I run out early.  I may be contactedwithout warning and asked to complete a urine drug test or pill count at any time.   3. I will keep all my appointments, and understand this is part of the monitoring of controlled substances. My care team may require an office visit for  EVERY controlled substance refill. If I miss appointments or dont follow instructions, my care team may stop my medicine.  4. I will not ask other providers to prescribe controlled substances, and I will not accept controlled substances from other people. If I need another prescribed controlled substance for a new reason, I will tell my care team within 1 business day.  5. I will use one pharmacy to fill all of my controlled substance prescriptions, and it is up to me to make sure that I do not run out of my medicines on weekends or holidays. If my care team is willing to refill my controlled substance prescription without a visit, I must request refills only during office hours, refills may take up to 3 days to process, and it may take up to 5 to 7 days for my medicine to be mailed and ready at my pharmacy. Prescriptions will not be mailed anywhere except my pharmacy.    6. I am responsible for my prescriptions. If the medicine/prescription is lost or stolen, it will not be replaced. I also agree not to share controlled substance medicines with anyone.          [facility] MEDICINE/OB  04/29/19  Patient:  Brianna Diaz  YOB: 1995  Medical Record Number: [id number]  CSN: [id number]    7. I agree to not use ANY illegal or recreational drugs. This includes marijuana, cocaine, bath salts or other drugs. I agree not to use alcohol unless my care team says I may. I agree to give urine samples whenever asked. If I dont give a urine sample, the care team may stop my medicine.    8. If I enroll in the [place] Medical Marijuana program, I will tell my care team. I will also sign an agreement to share my medical records with my care team.    9. I will bring in my list of medicines (or my medicine bottles) each time I come to the clinic.   10. I will tell my care team right away if I become pregnant or have a new medical problem treated outside of my regular clinic.  11. I understand that this  medicine can affect my thinking and judgment. It may be unsafe for me to drive, use machinery and do dangerous tasks. I will not do any of these things until I know how the medicine affects me. If my dose changes, I will wait to see how it affects me. I will contact my care team if I have concerns about medicine side effects.    I understand that if I do not follow any of the conditions above, my prescriptions or treatment may be stopped.      I agree that my provider, clinic care team, and pharmacy may work with any city, state or federal law enforcement agency that investigates the misuse, sale, or other diversion of my controlled medicine. I will allow my provider to discuss my care with or share a copy of this agreement with any other treating provider, pharmacy or emergency room where I receive care. I agree to give up (waive) any right of privacy or confidentiality with respect to these consents.   I have read this agreement and have asked questions about anything I did not understand.    ___________________________________________________________________________  Patient signature - Date/Time  -Brianna Diaz                                      ___________________________________________________________________________  Witness signature                                                                    ___________________________________________________________________________  Provider signature- Radha Justin MD

## 2021-06-19 NOTE — LETTER
Letter by Marleny Sands FNP at      Author: Marleny Sands FNP Service: -- Author Type: --    Filed:  Encounter Date: 5/24/2019 Status: (Other)         May 24, 2019     Patient: Brianna Diaz   YOB: 1995   Date of Visit: 5/24/2019       To Whom it May Concern:    Brianna Diaz was seen in my clinic on 5/24/2019. Please excuse Brianna from work through June 25th due to illness. If needed, please contact us for further paperwork or information.    If you have any questions or concerns, please don't hesitate to call.    Sincerely,         Electronically signed by SIVAKUMAR Bender

## 2021-06-19 NOTE — LETTER
"Letter by Radha Justin MD at      Author: Radha Justin MD Service: -- Author Type: --    Filed:  Encounter Date: 5/13/2019 Status: (Other)         May 13, 2019     Patient: Brianna Diaz   YOB: 1995   Date of Visit: 5/13/2019       To Whom it May Concern:    Brianna Diaz was seen in my clinic on 5/13/2019. I recommend that she is allowed to have \"incomplete\" status on all of her current college classes and be allowed to have more time to finish these classes over the summer. I'm working with her to address her medical conditions.    If you have any questions or concerns, please don't hesitate to call.    Sincerely,         Electronically signed by Radha Justin MD       " Walking - Outdoors allowed/Walking - Indoors allowed/No heavy lifting/straining/Showering allowed/Do not make important decisions/Do not drive or operate machinery/Stairs allowed

## 2021-06-19 NOTE — LETTER
Letter by Marleny Sands FNP at      Author: Marleny Sands FNP Service: -- Author Type: --    Filed:  Encounter Date: 5/24/2019 Status: (Other)         [facility] MEDICINE/OB  05/24/19    Patient: Brianna Diaz  YOB: 1995  Medical Record Number: [id number]  CSN: [id number]                                                                              Non-opioid Controlled Substance Agreement    I understand that my care provider has prescribed a controlled substance to help manage my condition(s). I am taking this medicine to help me function or work. I know this is strong medicine, and that it can cause serious side effects. Controlled substances can be sedating, addicting and may cause a dependency on the drug. They can affect my ability to drive or think, and cause depression. They need to be taken exactly as prescribed. Combining controlled substances with certain medicines or chemicals (such as cocaine, sedatives and tranquilizers, sleeping pills, meth) can be dangerous or even fatal. Also, if I stop controlled substances suddenly, I may have severe withdrawal symptoms.  If not helpful, I may be asked to stop them.    The risks, benefits, and side effects of these medicine(s) were explained to me. I agree that:    1. I will take part in other treatments as advised by my care team. This may be psychiatry or counseling, physical therapy, behavioral therapy, group treatment or a referral to a pain clinic. I will reduce or stop my medicine when my care team tells me to do so.  2. I will take my medicines as prescribed. I will not change the dose or schedule unless my care team tells me to. There will be no refills if I run out early.  I may be contactedwithout warning and asked to complete a urine drug test or pill count at any time.   3. I will keep all my appointments, and understand this is part of the monitoring of controlled substances. My care team may require an office visit for EVERY  controlled substance refill. If I miss appointments or dont follow instructions, my care team may stop my medicine.  4. I will not ask other providers to prescribe controlled substances, and I will not accept controlled substances from other people. If I need another prescribed controlled substance for a new reason, I will tell my care team within 1 business day.  5. I will use one pharmacy to fill all of my controlled substance prescriptions, and it is up to me to make sure that I do not run out of my medicines on weekends or holidays. If my care team is willing to refill my controlled substance prescription without a visit, I must request refills only during office hours, refills may take up to 3 days to process, and it may take up to 5 to 7 days for my medicine to be mailed and ready at my pharmacy. Prescriptions will not be mailed anywhere except my pharmacy.    6. I am responsible for my prescriptions. If the medicine/prescription is lost or stolen, it will not be replaced. I also agree not to share controlled substance medicines with anyone.          [facility] FAMILY MEDICINE/OB  05/24/19  Patient:  Brianna Diaz  YOB: 1995  Medical Record Number: [id number]  CSN: [id number]    7. I agree to not use ANY illegal or recreational drugs. This includes marijuana, cocaine, bath salts or other drugs. I agree not to use alcohol unless my care team says I may. I agree to give urine samples whenever asked. If I dont give a urine sample, the care team may stop my medicine.    8. If I enroll in the [place] Medical Marijuana program, I will tell my care team. I will also sign an agreement to share my medical records with my care team.    9. I will bring in my list of medicines (or my medicine bottles) each time I come to the clinic.   10. I will tell my care team right away if I become pregnant or have a new medical problem treated outside of my regular clinic.  11. I understand that this medicine can  affect my thinking and judgment. It may be unsafe for me to drive, use machinery and do dangerous tasks. I will not do any of these things until I know how the medicine affects me. If my dose changes, I will wait to see how it affects me. I will contact my care team if I have concerns about medicine side effects.    I understand that if I do not follow any of the conditions above, my prescriptions or treatment may be stopped.      I agree that my provider, clinic care team, and pharmacy may work with any city, state or federal law enforcement agency that investigates the misuse, sale, or other diversion of my controlled medicine. I will allow my provider to discuss my care with or share a copy of this agreement with any other treating provider, pharmacy or emergency room where I receive care. I agree to give up (waive) any right of privacy or confidentiality with respect to these consents.   I have read this agreement and have asked questions about anything I did not understand.    ___________________________________________________________________________  Patient signature - Date/Time  -Brianna Diaz                                      ___________________________________________________________________________  Witness signature                                                                    ___________________________________________________________________________  Provider signature- SIVAKUMAR Bender

## 2021-08-14 ENCOUNTER — HEALTH MAINTENANCE LETTER (OUTPATIENT)
Age: 26
End: 2021-08-14

## 2021-08-20 ENCOUNTER — MYC MEDICAL ADVICE (OUTPATIENT)
Dept: FAMILY MEDICINE | Facility: CLINIC | Age: 26
End: 2021-08-20

## 2021-08-20 DIAGNOSIS — Z30.41 ORAL CONTRACEPTIVE PILL SURVEILLANCE: ICD-10-CM

## 2021-08-20 RX ORDER — NORETHINDRONE ACETATE AND ETHINYL ESTRADIOL, ETHINYL ESTRADIOL AND FERROUS FUMARATE 1MG-10(24)
1 KIT ORAL DAILY
Qty: 30 TABLET | Refills: 0 | Status: SHIPPED | OUTPATIENT
Start: 2021-08-20 | End: 2021-11-08

## 2021-10-09 ENCOUNTER — HEALTH MAINTENANCE LETTER (OUTPATIENT)
Age: 26
End: 2021-10-09

## 2021-10-19 PROBLEM — F32.9 MAJOR DEPRESSION: Status: ACTIVE | Noted: 2020-02-15

## 2021-11-08 ENCOUNTER — OFFICE VISIT (OUTPATIENT)
Dept: FAMILY MEDICINE | Facility: CLINIC | Age: 26
End: 2021-11-08
Payer: COMMERCIAL

## 2021-11-08 VITALS
BODY MASS INDEX: 25.11 KG/M2 | SYSTOLIC BLOOD PRESSURE: 127 MMHG | HEART RATE: 93 BPM | RESPIRATION RATE: 16 BRPM | DIASTOLIC BLOOD PRESSURE: 85 MMHG | WEIGHT: 133 LBS | HEIGHT: 61 IN

## 2021-11-08 DIAGNOSIS — K30 FUNCTIONAL DYSPEPSIA: ICD-10-CM

## 2021-11-08 DIAGNOSIS — K58.1 IRRITABLE BOWEL SYNDROME WITH CONSTIPATION: ICD-10-CM

## 2021-11-08 DIAGNOSIS — Z00.00 ENCOUNTER FOR PREVENTATIVE ADULT HEALTH CARE EXAMINATION: Primary | ICD-10-CM

## 2021-11-08 DIAGNOSIS — Z30.41 ORAL CONTRACEPTIVE PILL SURVEILLANCE: ICD-10-CM

## 2021-11-08 DIAGNOSIS — K31.84 GASTROPARESIS: ICD-10-CM

## 2021-11-08 DIAGNOSIS — J45.20 MILD INTERMITTENT ASTHMA WITHOUT COMPLICATION: ICD-10-CM

## 2021-11-08 DIAGNOSIS — Z12.4 SCREENING FOR CERVICAL CANCER: ICD-10-CM

## 2021-11-08 DIAGNOSIS — F41.8 OTHER SPECIFIED ANXIETY DISORDERS: ICD-10-CM

## 2021-11-08 DIAGNOSIS — F33.9 EPISODE OF RECURRENT MAJOR DEPRESSIVE DISORDER, UNSPECIFIED DEPRESSION EPISODE SEVERITY (H): ICD-10-CM

## 2021-11-08 LAB
ALBUMIN SERPL-MCNC: 4 G/DL (ref 3.5–5)
ALP SERPL-CCNC: 58 U/L (ref 45–120)
ALT SERPL W P-5'-P-CCNC: 10 U/L (ref 0–45)
ANION GAP SERPL CALCULATED.3IONS-SCNC: 8 MMOL/L (ref 5–18)
AST SERPL W P-5'-P-CCNC: 15 U/L (ref 0–40)
BILIRUB SERPL-MCNC: 0.4 MG/DL (ref 0–1)
BUN SERPL-MCNC: 9 MG/DL (ref 8–22)
CALCIUM SERPL-MCNC: 9.3 MG/DL (ref 8.5–10.5)
CHLORIDE BLD-SCNC: 106 MMOL/L (ref 98–107)
CHOLEST SERPL-MCNC: 176 MG/DL
CO2 SERPL-SCNC: 25 MMOL/L (ref 22–31)
CREAT SERPL-MCNC: 0.65 MG/DL (ref 0.6–1.1)
ERYTHROCYTE [DISTWIDTH] IN BLOOD BY AUTOMATED COUNT: 12.1 % (ref 10–15)
FASTING STATUS PATIENT QL REPORTED: YES
GFR SERPL CREATININE-BSD FRML MDRD: >90 ML/MIN/1.73M2
GLUCOSE BLD-MCNC: 82 MG/DL (ref 70–125)
HCT VFR BLD AUTO: 39.3 % (ref 35–47)
HDLC SERPL-MCNC: 65 MG/DL
HGB BLD-MCNC: 13.2 G/DL (ref 11.7–15.7)
LDLC SERPL CALC-MCNC: 100 MG/DL
MCH RBC QN AUTO: 30.5 PG (ref 26.5–33)
MCHC RBC AUTO-ENTMCNC: 33.6 G/DL (ref 31.5–36.5)
MCV RBC AUTO: 91 FL (ref 78–100)
PLATELET # BLD AUTO: 296 10E3/UL (ref 150–450)
POTASSIUM BLD-SCNC: 4.8 MMOL/L (ref 3.5–5)
PROT SERPL-MCNC: 6.8 G/DL (ref 6–8)
RBC # BLD AUTO: 4.33 10E6/UL (ref 3.8–5.2)
SODIUM SERPL-SCNC: 139 MMOL/L (ref 136–145)
TRIGL SERPL-MCNC: 55 MG/DL
TSH SERPL DL<=0.005 MIU/L-ACNC: 2.91 UIU/ML (ref 0.3–5)
WBC # BLD AUTO: 5.9 10E3/UL (ref 4–11)

## 2021-11-08 PROCEDURE — 90471 IMMUNIZATION ADMIN: CPT | Performed by: FAMILY MEDICINE

## 2021-11-08 PROCEDURE — 84443 ASSAY THYROID STIM HORMONE: CPT | Performed by: FAMILY MEDICINE

## 2021-11-08 PROCEDURE — 36415 COLL VENOUS BLD VENIPUNCTURE: CPT | Performed by: FAMILY MEDICINE

## 2021-11-08 PROCEDURE — 85027 COMPLETE CBC AUTOMATED: CPT | Performed by: FAMILY MEDICINE

## 2021-11-08 PROCEDURE — 99213 OFFICE O/P EST LOW 20 MIN: CPT | Mod: 25 | Performed by: FAMILY MEDICINE

## 2021-11-08 PROCEDURE — 80053 COMPREHEN METABOLIC PANEL: CPT | Performed by: FAMILY MEDICINE

## 2021-11-08 PROCEDURE — 90714 TD VACC NO PRESV 7 YRS+ IM: CPT | Performed by: FAMILY MEDICINE

## 2021-11-08 PROCEDURE — 80061 LIPID PANEL: CPT | Performed by: FAMILY MEDICINE

## 2021-11-08 PROCEDURE — G0123 SCREEN CERV/VAG THIN LAYER: HCPCS | Performed by: FAMILY MEDICINE

## 2021-11-08 PROCEDURE — G0124 SCREEN C/V THIN LAYER BY MD: HCPCS | Performed by: PATHOLOGY

## 2021-11-08 PROCEDURE — 99395 PREV VISIT EST AGE 18-39: CPT | Mod: 25 | Performed by: FAMILY MEDICINE

## 2021-11-08 PROCEDURE — 82306 VITAMIN D 25 HYDROXY: CPT | Performed by: FAMILY MEDICINE

## 2021-11-08 RX ORDER — HYDROXYZINE HYDROCHLORIDE 25 MG/1
25-50 TABLET, FILM COATED ORAL PRN
Status: ON HOLD | COMMUNITY
Start: 2021-03-15 | End: 2024-09-27

## 2021-11-08 RX ORDER — NORETHINDRONE ACETATE AND ETHINYL ESTRADIOL, ETHINYL ESTRADIOL AND FERROUS FUMARATE 1MG-10(24)
1 KIT ORAL DAILY
Qty: 90 TABLET | Refills: 3 | Status: SHIPPED | OUTPATIENT
Start: 2021-11-08 | End: 2022-11-10

## 2021-11-08 ASSESSMENT — MIFFLIN-ST. JEOR: SCORE: 1280.66

## 2021-11-08 ASSESSMENT — ASTHMA QUESTIONNAIRES
QUESTION_4 LAST FOUR WEEKS HOW OFTEN HAVE YOU USED YOUR RESCUE INHALER OR NEBULIZER MEDICATION (SUCH AS ALBUTEROL): NOT AT ALL
QUESTION_1 LAST FOUR WEEKS HOW MUCH OF THE TIME DID YOUR ASTHMA KEEP YOU FROM GETTING AS MUCH DONE AT WORK, SCHOOL OR AT HOME: NONE OF THE TIME
QUESTION_5 LAST FOUR WEEKS HOW WOULD YOU RATE YOUR ASTHMA CONTROL: COMPLETELY CONTROLLED
QUESTION_2 LAST FOUR WEEKS HOW OFTEN HAVE YOU HAD SHORTNESS OF BREATH: NOT AT ALL
QUESTION_3 LAST FOUR WEEKS HOW OFTEN DID YOUR ASTHMA SYMPTOMS (WHEEZING, COUGHING, SHORTNESS OF BREATH, CHEST TIGHTNESS OR PAIN) WAKE YOU UP AT NIGHT OR EARLIER THAN USUAL IN THE MORNING: NOT AT ALL
ACT_TOTALSCORE: 25

## 2021-11-08 ASSESSMENT — ANXIETY QUESTIONNAIRES
7. FEELING AFRAID AS IF SOMETHING AWFUL MIGHT HAPPEN: SEVERAL DAYS
IF YOU CHECKED OFF ANY PROBLEMS ON THIS QUESTIONNAIRE, HOW DIFFICULT HAVE THESE PROBLEMS MADE IT FOR YOU TO DO YOUR WORK, TAKE CARE OF THINGS AT HOME, OR GET ALONG WITH OTHER PEOPLE: SOMEWHAT DIFFICULT
2. NOT BEING ABLE TO STOP OR CONTROL WORRYING: NOT AT ALL
6. BECOMING EASILY ANNOYED OR IRRITABLE: SEVERAL DAYS
1. FEELING NERVOUS, ANXIOUS, OR ON EDGE: SEVERAL DAYS
GAD7 TOTAL SCORE: 6
3. WORRYING TOO MUCH ABOUT DIFFERENT THINGS: NOT AT ALL
5. BEING SO RESTLESS THAT IT IS HARD TO SIT STILL: MORE THAN HALF THE DAYS

## 2021-11-08 ASSESSMENT — PATIENT HEALTH QUESTIONNAIRE - PHQ9: 5. POOR APPETITE OR OVEREATING: SEVERAL DAYS

## 2021-11-08 NOTE — PROGRESS NOTES
SUBJECTIVE:   CC: Brianna Alarcon is an 26 year old woman who presents for preventive health visit.     Patient has been advised of split billing requirements and indicates understanding: Yes  HPI    PROBLEMS TO ADD ON...  Chief Complaint   Patient presents with     Physical     Not fasting      She has a history of irritable bowel syndrome with constipation, functional dyspepsia, gastroparesis and has been referred to gastroenterology.  She is on Zofran long-term for this but only takes 2-3 times a year.  See psychiatry for moderate major depression.  Currently taking bupropion, Adderall, Cymbalta, hydroxyzine, trazodone.  She is on birth control as well.  Last Pap smear was 2017 and unremarkable, repeat today. Takes laxative as needed for constipation.     Today's PHQ-2 Score: No flowsheet data found.    Abuse: Current or Past (Physical, Sexual or Emotional) - No  Do you feel safe in your environment? Yes    Have you ever done Advance Care Planning? (For example, a Health Directive, POLST, or a discussion with a medical provider or your loved ones about your wishes): No, advance care planning information given to patient to review.  Patient plans to discuss their wishes with loved ones or provider.      Social History     Tobacco Use     Smoking status: Never Smoker     Smokeless tobacco: Never Used   Substance Use Topics     Alcohol use: Yes     Alcohol/week: 0.0 - 3.0 standard drinks       No flowsheet data found.    Reviewed orders with patient.  Reviewed health maintenance and updated orders accordingly - Yes  Lab work is in process    Breast Cancer Screening:    Breast CA Risk Assessment (FHS-7) 11/8/2021   Do you have a family history of breast, colon, or ovarian cancer? No / Unknown         Patient under 40 years of age: Routine Mammogram Screening not recommended.   Pertinent mammograms are reviewed under the imaging tab.    History of abnormal Pap smear: NO - age 21-29 PAP every 3 years recommended    "  Reviewed and updated as needed this visit by clinical staff  Tobacco   Meds             Reviewed and updated as needed this visit by Provider                 Past Medical History:   Diagnosis Date     Anxiety      Controlled substance agreement signed 4/29/2019     IBS (irritable bowel syndrome)       No past surgical history on file.  OB History   No obstetric history on file.       Review of Systems  CONSTITUTIONAL: NEGATIVE for fever, chills, change in weight  INTEGUMENTARU/SKIN: NEGATIVE for worrisome rashes, moles or lesions  EYES: NEGATIVE for vision changes or irritation  ENT: NEGATIVE for ear, mouth and throat problems  RESP: NEGATIVE for significant cough or SOB  BREAST: NEGATIVE for masses, tenderness or discharge  CV: NEGATIVE for chest pain, palpitations or peripheral edema  GI: NEGATIVE for nausea, abdominal pain, heartburn, or change in bowel habits  : NEGATIVE for unusual urinary or vaginal symptoms. Periods are regular.  MUSCULOSKELETAL: NEGATIVE for significant arthralgias or myalgia  NEURO: NEGATIVE for weakness, dizziness or paresthesias  PSYCHIATRIC: NEGATIVE for changes in mood or affect     OBJECTIVE:   /85 (BP Location: Left arm, Patient Position: Sitting, Cuff Size: Adult Regular)   Pulse 93   Resp 16   Ht 1.549 m (5' 1\")   Wt 60.3 kg (133 lb)   BMI 25.13 kg/m    Physical Exam  GENERAL: healthy, alert and no distress  NECK: no adenopathy, no asymmetry, masses, or scars and thyroid normal to palpation  RESP: lungs clear to auscultation - no rales, rhonchi or wheezes  CV: regular rate and rhythm, normal S1 S2, no S3 or S4, no murmur, click or rub, no peripheral edema and peripheral pulses strong  ABDOMEN: soft, nontender, no hepatosplenomegaly, no masses and bowel sounds normal   (female): normal female external genitalia, normal urethral meatus, vaginal mucosa, normal cervix/adnexa/uterus without masses or discharge  MS: no gross musculoskeletal defects noted, no " edema  NEURO: Normal strength and tone, mentation intact and speech normal  PSYCH: mentation appears normal, affect normal/bright  LYMPH: no cervical, supraclavicular, axillary, or inguinal adenopathy    Diagnostic Test Results:  Labs reviewed in Epic    ASSESSMENT/PLAN:   Brianna was seen today for physical.    Diagnoses and all orders for this visit:    Encounter for preventative adult health care examination  Routine history and physical, updated in EMR.  Pap smear obtained today.  Lab work obtained today.  Follow-up in a year.  -     CBC with platelets; Future  -     Comprehensive metabolic panel (BMP + Alb, Alk Phos, ALT, AST, Total. Bili, TP); Future  -     Lipid panel; Future  -     TSH with free T4 reflex; Future  -     Vitamin D Deficiency; Future  -     CBC with platelets  -     Comprehensive metabolic panel (BMP + Alb, Alk Phos, ALT, AST, Total. Bili, TP)  -     Lipid panel  -     TSH with free T4 reflex  -     Vitamin D Deficiency    Oral contraceptive pill surveillance  Stable.  No contraindication.  -     Norethin-Eth Estrad-Fe Biphas (LO LOESTRIN FE) 1 MG-10 MCG / 10 MCG TABS; Take 1 tablet by mouth daily    Episode of recurrent major depressive disorder, unspecified depression episode severity (H)  Other specified anxiety disorders  Seeing psych. Stable. She is on Prozac and Cymbalta and Adderall and trazodone and hydroxyzine as needed.  -     TSH with free T4 reflex; Future  -     Vitamin D Deficiency; Future  -     TSH with free T4 reflex  -     Vitamin D Deficiency    Mild intermittent asthma without complication  Childhood. Not a problem now.    Functional dyspepsia  Gastroparesis  Irritable bowel syndrome with constipation  zofran 2-3 tabs a year. Laxatives prn.    Screening for cervical cancer  -     Pap screen reflex to HPV if ASCUS - recommend age 25 - 29    Other orders  -     TD PRESERV FREE, IM (7+ YRS) (DECAVAC/TENIVAC)  -     REVIEW OF HEALTH MAINTENANCE PROTOCOL ORDERS        Patient has  "been advised of split billing requirements and indicates understanding: Yes  COUNSELING:  Reviewed preventive health counseling, as reflected in patient instructions       Regular exercise       Healthy diet/nutrition       Advance Care Planning    Estimated body mass index is 25.13 kg/m  as calculated from the following:    Height as of this encounter: 1.549 m (5' 1\").    Weight as of this encounter: 60.3 kg (133 lb).      She reports that she has never smoked. She has never used smokeless tobacco.      Counseling Resources:  ATP IV Guidelines  Pooled Cohorts Equation Calculator  Breast Cancer Risk Calculator  BRCA-Related Cancer Risk Assessment: FHS-7 Tool  FRAX Risk Assessment  ICSI Preventive Guidelines  Dietary Guidelines for Americans, 2010  USDA's MyPlate  ASA Prophylaxis  Lung CA Screening    Radha Justin MD  [facility]  "

## 2021-11-09 LAB — DEPRECATED CALCIDIOL+CALCIFEROL SERPL-MC: 26 UG/L (ref 30–80)

## 2021-11-09 ASSESSMENT — ANXIETY QUESTIONNAIRES: GAD7 TOTAL SCORE: 6

## 2021-11-09 ASSESSMENT — ASTHMA QUESTIONNAIRES: ACT_TOTALSCORE: 25

## 2021-11-09 ASSESSMENT — PATIENT HEALTH QUESTIONNAIRE - PHQ9: SUM OF ALL RESPONSES TO PHQ QUESTIONS 1-9: 9

## 2021-11-11 LAB
BKR LAB AP GYN ADEQUACY: ABNORMAL
BKR LAB AP GYN INTERPRETATION: ABNORMAL
BKR LAB AP HPV REFLEX: ABNORMAL
BKR LAB AP PREVIOUS ABNORMAL: ABNORMAL
PATH REPORT.COMMENTS IMP SPEC: ABNORMAL
PATH REPORT.COMMENTS IMP SPEC: ABNORMAL
PATH REPORT.RELEVANT HX SPEC: ABNORMAL

## 2021-11-12 ENCOUNTER — PATIENT OUTREACH (OUTPATIENT)
Dept: FAMILY MEDICINE | Facility: CLINIC | Age: 26
End: 2021-11-12
Payer: COMMERCIAL

## 2021-11-15 ENCOUNTER — MYC MEDICAL ADVICE (OUTPATIENT)
Dept: FAMILY MEDICINE | Facility: CLINIC | Age: 26
End: 2021-11-15
Payer: COMMERCIAL

## 2021-11-19 NOTE — TELEPHONE ENCOUNTER
Message sent to patient.  She needs to sign the form.  Find out if she wants it picked up, mailed or faxed.

## 2021-11-30 ENCOUNTER — TELEPHONE (OUTPATIENT)
Dept: FAMILY MEDICINE | Facility: CLINIC | Age: 26
End: 2021-11-30
Payer: COMMERCIAL

## 2021-12-16 ENCOUNTER — OFFICE VISIT (OUTPATIENT)
Dept: FAMILY MEDICINE | Facility: CLINIC | Age: 26
End: 2021-12-16
Payer: COMMERCIAL

## 2021-12-16 VITALS
SYSTOLIC BLOOD PRESSURE: 132 MMHG | DIASTOLIC BLOOD PRESSURE: 82 MMHG | WEIGHT: 130.5 LBS | HEIGHT: 61 IN | HEART RATE: 104 BPM | BODY MASS INDEX: 24.64 KG/M2

## 2021-12-16 DIAGNOSIS — R87.612 LOW GRADE SQUAMOUS INTRAEPITH LESION ON CYTOLOGIC SMEAR CERVIX (LGSIL): Primary | ICD-10-CM

## 2021-12-16 LAB — HCG UR QL: NEGATIVE

## 2021-12-16 PROCEDURE — 81025 URINE PREGNANCY TEST: CPT | Performed by: FAMILY MEDICINE

## 2021-12-16 PROCEDURE — 57455 BIOPSY OF CERVIX W/SCOPE: CPT | Performed by: FAMILY MEDICINE

## 2021-12-16 PROCEDURE — 88305 TISSUE EXAM BY PATHOLOGIST: CPT | Performed by: PATHOLOGY

## 2021-12-16 ASSESSMENT — MIFFLIN-ST. JEOR: SCORE: 1269.32

## 2021-12-16 NOTE — PROGRESS NOTES
26 year old female presents for colposcopy, referred by Radha Justin  Pap smear   1 months ago showed: LSIL. No previous pap smears in our current records. Outside pap 12/27/17 reportedly normal.     Recent Results (from the past 24 hour(s))   HCG qualitative urine    Collection Time: 12/16/21 11:25 AM   Result Value Ref Range    hCG Urine Qualitative Negative Negative     Allergies: Aspirin, Oxycodone-acetaminophen, Trimethoprim, Sulfamethoxazole, and Sulfamethoxazole-trimethoprim [sulfamethoxazole w/trimethoprim]    Prior cervical/vaginal disease: none.  Prior cervical treatment: no treatment.    Procedure for colposcopy and biopsy has been explained to the   patient and consent obtained.    PROCEDURE:  Speculum placed in vagina and excellent visualization of cervix   acheived, cervix swabbed x 3 with acetic acid solution.    FINDINGS:  Cervix: no mosaicism, no punctation, no abnormal vasculature and acetowhite lesion(s) noted at 3 o'clock; cervix swabbed with Lugol's solution, cervical biopsies taken at 3 o'clock, specimen labelled and sent to pathology and hemostasis achieved with Monsel's solution.    Procedure Summary: Patient tolerated procedure well and colposcopy adequate.    ASSESSMENT:   Low grade squamous intraepith lesion on cytologic smear cervix (lgsil)  - HCG qualitative urine  - Colposcopy, with biopsy  - SURGICAL PATHOLOGY EXAM     Abnormalities are present on colposcopy examination today.     PLAN: Specimens labelled and sent to pathology., Will base further treatment on pathology findings. and post biopsy instructions given to patient.    Adriane Zamorano DO

## 2021-12-16 NOTE — PATIENT INSTRUCTIONS
Patient Education     Colposcopy  Colposcopy is a procedure that gives your healthcare provider a magnified view of your cervix. It's done using a lighted microscope called a colposcope. In most cases, a sample of cervical cells is taken during a biopsy. The sample can then be studied in a lab. If any problems are found, you and your healthcare provider will discuss treatment options. It usually takes less than 30 minutes, and you can often go back to your normal routine right away.   Reasons for the procedure  Colposcopy is usually done as a follow-up exam to help find the cause of an abnormal Pap test. Results of an abnormal Pap test can mean that the cells don t appear normal or that there are cancer cells. Abnormal cells can also be caused by infections. HPV (human papillomavirus) is a large family of viruses that can be passed from person to person through sex. HPV can cause genital warts. It can also cause changes in cervical cells. If an HPV test is positive and the Pap test is abnormal, a colposcopy may be recommended. Colposcopy is also used to evaluate other problems. These include pain or bleeding during sex, or a sore (lesion) on the vulva or vagina.   What are the risks?  Problems after colposcopy are very rare, but can include:     Bleeding (if a biopsy is done)    Infection  Getting ready for the procedure  Colposcopy is normally done in your healthcare provider s office. It will be scheduled for a time when you re not having your menstrual period. You may be asked to sign a form giving your consent to have the procedure. A day or 2 before the procedure, your healthcare provider may also ask you to:     Not have sex    Stop using tampons    Not use creams or other vaginal medicines    Not clean out the vagina with water or other fluids (douche)    Take over-the-counter pain medicines an hour or 2 before the procedure  During colposcopy    You will be asked to lie on an exam table with your knees  bent, just as you do for a Pap test.    A tool called a speculum is inserted into the vagina to hold it open.    A vinegar solution is applied to the cervix to make the abnormal cells easier to see. You may feel pressure or a slight burning for a few moments. In some cases, the cervix may be numbed first with an anesthetic.    The cervix is looked at through the colposcope, which is placed outside the vagina.    If your healthcare provider sees abnormal areas on your cervix, a biopsy will be done. The tissue sample is sent to a lab for study.    An endocervical curettage may also be done at the time of colposcopy. In this procedure, a tool is put into the endocervical canal to get a sample of cells from the endocervix. This area can't be seen with a colposcope.     You may feel slight pinching or cramping during the biopsy. Medicine may be applied to the biopsy site to stop bleeding.    After the procedure    If you feel lightheaded or dizzy, you can rest on the table until you re ready to sit up.    If a biopsy was done, you may have mild cramping or light bleeding for a few days. You may also have discharge from the medicine used to stop bleeding at the biopsy site.    Use pads, not tampons, for at least the first 24 hours.    If you have any discomfort, over-the-counter pain medicine can provide relief.    Ask your healthcare provider when you can have sex again.    Follow-up  If a biopsy was done, your healthcare provider will get the lab report in a week or 2. You and your healthcare provider can then discuss the results. In some cases, you may be scheduled for more tests or treatment. Be sure to keep follow-up appointments with your healthcare provider.   When to call your healthcare provider  Call your healthcare provider if you have:     Heavy vaginal bleeding (more than a pad an hour for 2 hours)    Severe or increasing pelvic pain    A fever over 100.4 F (38 C), or higher, or as directed by your  provider    Bad-smelling or unusual vaginal discharge  [employer] last reviewed this educational content on 4/1/2020 2000-2021 [employer]. All rights reserved. This information is not intended as a substitute for professional medical care. Always follow your healthcare professional's instructions.

## 2021-12-17 ENCOUNTER — MYC MEDICAL ADVICE (OUTPATIENT)
Dept: FAMILY MEDICINE | Facility: CLINIC | Age: 26
End: 2021-12-17
Payer: COMMERCIAL

## 2021-12-17 DIAGNOSIS — R80.9 PROTEINURIA, UNSPECIFIED TYPE: ICD-10-CM

## 2021-12-17 DIAGNOSIS — R30.0 DYSURIA: Primary | ICD-10-CM

## 2021-12-17 NOTE — TELEPHONE ENCOUNTER
1. Dysuria  - UA Macro with Reflex to Micro and Culture - lab collect; Future     IUD inserted yesterday.  Now with symptoms of UTI.  UA ordered to screen for infection.  Patient may schedule lab appointment if desired.    Adriane Zamorano, DO

## 2021-12-20 LAB
PATH REPORT.COMMENTS IMP SPEC: NORMAL
PATH REPORT.FINAL DX SPEC: NORMAL
PATH REPORT.GROSS SPEC: NORMAL
PATH REPORT.MICROSCOPIC SPEC OTHER STN: NORMAL
PATH REPORT.RELEVANT HX SPEC: NORMAL
PHOTO IMAGE: NORMAL

## 2021-12-20 NOTE — RESULT ENCOUNTER NOTE
Patient updated by [employer] message. No dyplasia present. Repeat in 1 year.  Updated Health Maintenance follow up reminder.  Adriane Zamorano, DO

## 2022-01-04 ENCOUNTER — PATIENT OUTREACH (OUTPATIENT)
Dept: FAMILY MEDICINE | Facility: CLINIC | Age: 27
End: 2022-01-04
Payer: COMMERCIAL

## 2022-01-04 PROBLEM — R87.612 LOW GRADE SQUAMOUS INTRAEPITH LESION ON CYTOLOGIC SMEAR CERVIX (LGSIL): Status: ACTIVE | Noted: 2021-11-12

## 2022-01-13 ENCOUNTER — LAB (OUTPATIENT)
Dept: LAB | Facility: CLINIC | Age: 27
End: 2022-01-13
Payer: COMMERCIAL

## 2022-01-13 DIAGNOSIS — R30.0 DYSURIA: ICD-10-CM

## 2022-01-13 LAB
ALBUMIN UR-MCNC: 100 MG/DL
AMORPH CRY #/AREA URNS HPF: ABNORMAL /HPF
APPEARANCE UR: CLEAR
BACTERIA #/AREA URNS HPF: ABNORMAL /HPF
BILIRUB UR QL STRIP: NEGATIVE
COLOR UR AUTO: YELLOW
GLUCOSE UR STRIP-MCNC: NEGATIVE MG/DL
HGB UR QL STRIP: NEGATIVE
KETONES UR STRIP-MCNC: ABNORMAL MG/DL
LEUKOCYTE ESTERASE UR QL STRIP: NEGATIVE
NITRATE UR QL: NEGATIVE
PH UR STRIP: 7.5 [PH] (ref 5–8)
RBC #/AREA URNS AUTO: ABNORMAL /HPF
SP GR UR STRIP: 1.02 (ref 1–1.03)
SQUAMOUS #/AREA URNS AUTO: ABNORMAL /LPF
UROBILINOGEN UR STRIP-ACNC: 2 E.U./DL
WBC #/AREA URNS AUTO: ABNORMAL /HPF

## 2022-01-13 PROCEDURE — 81001 URINALYSIS AUTO W/SCOPE: CPT

## 2022-01-13 NOTE — RESULT ENCOUNTER NOTE
Patient updated by [employer] message.   -------------------------------------  Wili Reed,Thanks for coming back to drop off a urine sample.  Your urine does not appear infected.  There was a little bit of protein in it which was new from last year.  How are you feeling now?  Adriane Zamorano, DO

## 2022-01-18 ENCOUNTER — TELEPHONE (OUTPATIENT)
Dept: LAB | Facility: CLINIC | Age: 27
End: 2022-01-18
Payer: COMMERCIAL

## 2022-01-18 NOTE — PROGRESS NOTES
Patient coming in 1/19/2022 for a urine drug screen. Please review patient chart and place order if needed, thanks!

## 2022-01-19 ENCOUNTER — LAB (OUTPATIENT)
Dept: LAB | Facility: CLINIC | Age: 27
End: 2022-01-19
Payer: COMMERCIAL

## 2022-01-19 DIAGNOSIS — F90.2 ATTENTION DEFICIT HYPERACTIVITY DISORDER, COMBINED TYPE: ICD-10-CM

## 2022-01-19 DIAGNOSIS — F90.2 ATTENTION DEFICIT HYPERACTIVITY DISORDER, COMBINED TYPE: Primary | ICD-10-CM

## 2022-01-19 LAB
AMPHETAMINES UR QL SCN: NORMAL
BARBITURATES UR QL: NORMAL
BENZODIAZ UR QL: NORMAL
CANNABINOIDS UR QL SCN: NORMAL
COCAINE UR QL: NORMAL
CREAT UR-MCNC: 209 MG/DL
OPIATES UR QL SCN: NORMAL
OXYCODONE UR QL: NORMAL
PCP UR QL SCN: NORMAL

## 2022-01-19 PROCEDURE — 80307 DRUG TEST PRSMV CHEM ANLYZR: CPT

## 2022-01-19 NOTE — TELEPHONE ENCOUNTER
Looks like Dr. Zamorano has already placed microalbumin for her. I don't think she needs urine drug screen.

## 2022-03-23 ENCOUNTER — LAB (OUTPATIENT)
Dept: LAB | Facility: CLINIC | Age: 27
End: 2022-03-23
Payer: COMMERCIAL

## 2022-03-23 DIAGNOSIS — R80.9 PROTEINURIA, UNSPECIFIED TYPE: Primary | ICD-10-CM

## 2022-03-23 DIAGNOSIS — R80.9 PROTEINURIA, UNSPECIFIED TYPE: ICD-10-CM

## 2022-03-23 LAB
CREAT UR-MCNC: 235 MG/DL
MICROALBUMIN UR-MCNC: 8.05 MG/DL (ref 0–1.99)
MICROALBUMIN/CREAT UR: 34.3 MG/G CR

## 2022-03-23 PROCEDURE — 82043 UR ALBUMIN QUANTITATIVE: CPT

## 2022-03-24 PROCEDURE — 84166 PROTEIN E-PHORESIS/URINE/CSF: CPT | Performed by: PATHOLOGY

## 2022-03-24 NOTE — PROGRESS NOTES
1. Proteinuria, unspecified type  - Protein electrophoresis random urine; Future     Microalbuminuria present without identified etiology.   Proceed with UPEP.     Adriane Zamorano, DO

## 2022-03-24 NOTE — RESULT ENCOUNTER NOTE
Patient updated by [employer] message. Await UPEP results.    Wili Reed,  Your urine continues to show a small amount of protein. I am attempting to add on another urine test to identify what kind of protein is present in your urine. I will be in touch when those results return.  Adriane Zamorano, DO

## 2022-03-30 LAB
PATH ICD:: NORMAL
PROT PATTERN UR ELPH-IMP: NORMAL
REVIEWING PATHOLOGIST: NORMAL
TOTAL PROTEIN RANDOM URINE MG/DL: 20 MG/DL

## 2022-04-11 ENCOUNTER — OFFICE VISIT (OUTPATIENT)
Dept: FAMILY MEDICINE | Facility: CLINIC | Age: 27
End: 2022-04-11
Payer: COMMERCIAL

## 2022-04-11 VITALS
WEIGHT: 129 LBS | DIASTOLIC BLOOD PRESSURE: 87 MMHG | RESPIRATION RATE: 16 BRPM | HEART RATE: 115 BPM | BODY MASS INDEX: 24.37 KG/M2 | SYSTOLIC BLOOD PRESSURE: 122 MMHG

## 2022-04-11 DIAGNOSIS — F33.9 EPISODE OF RECURRENT MAJOR DEPRESSIVE DISORDER, UNSPECIFIED DEPRESSION EPISODE SEVERITY (H): ICD-10-CM

## 2022-04-11 DIAGNOSIS — G43.109 MIGRAINE WITH AURA AND WITHOUT STATUS MIGRAINOSUS, NOT INTRACTABLE: Primary | ICD-10-CM

## 2022-04-11 PROCEDURE — 99214 OFFICE O/P EST MOD 30 MIN: CPT | Performed by: FAMILY MEDICINE

## 2022-04-11 RX ORDER — SUMATRIPTAN 50 MG/1
50 TABLET, FILM COATED ORAL
Qty: 30 TABLET | Refills: 0 | Status: SHIPPED | OUTPATIENT
Start: 2022-04-11 | End: 2023-01-25

## 2022-04-11 RX ORDER — DEXTROAMPHETAMINE SACCHARATE, AMPHETAMINE ASPARTATE, DEXTROAMPHETAMINE SULFATE AND AMPHETAMINE SULFATE 2.5; 2.5; 2.5; 2.5 MG/1; MG/1; MG/1; MG/1
10 TABLET ORAL 2 TIMES DAILY
Status: ON HOLD | COMMUNITY
Start: 2022-04-05 | End: 2024-09-27

## 2022-04-11 RX ORDER — TOPIRAMATE 50 MG/1
50 TABLET, FILM COATED ORAL AT BEDTIME
Qty: 90 TABLET | Refills: 0 | Status: SHIPPED | OUTPATIENT
Start: 2022-04-11 | End: 2022-05-12

## 2022-04-11 ASSESSMENT — ENCOUNTER SYMPTOMS: HEADACHES: 1

## 2022-04-11 NOTE — PROGRESS NOTES
"  Assessment & Plan     Migraine with aura and without status migrainosus, not intractable  No red flag symptoms.  Indication for brain imaging at this point in time or any lab work at this point time.  She is on other psychiatric medications and I reviewed those with her and they are prescribed by her psychiatrist.  We will go ahead and prescribe migraine prevention with Topamax 50 mg at nighttime.  Reviewed drug and drug interaction.  We will also give her sumatriptan for abortive migraine therapy but discussed that she cannot use this more than 2 times a week.  She agrees with plan.  Follow-up in a month for follow-up of these medications effectiveness.  If not improving may consider brain imaging and referral to neurology.  - topiramate (TOPAMAX) 50 MG tablet  Dispense: 90 tablet; Refill: 0  - SUMAtriptan (IMITREX) 50 MG tablet  Dispense: 30 tablet; Refill: 0    Episode of recurrent major depressive disorder, unspecified depression episode severity (H)  Stable. Seeing psych.       Return in about 4 weeks (around 5/9/2022) for Follow up, with me, in person.    Radha Justin MD  [facility]    Cain Reed is a 26 year old who presents for the following health issues     Headache     History of Present Illness       Migraines:   Since the patient's last clinic visit, headaches are: worsened  The patient is getting headaches:  2-4 week  She is not able to do normal daily activities when she has a migraine.  The patient is taking the following rescue/relief medications:  Tylenol, Excedrin and other   Patient states \"I get only a small amount of relief\" from the rescue/relief medications.   The patient is taking the following medications to prevent migraines:  Other  In the past 4 weeks, the patient has gone to an Urgent Care or Emergency Room 0 times times due to headaches.    She eats 4 or more servings of fruits and vegetables daily.She consumes 1 sweetened beverage(s) " daily.She exercises with enough effort to increase her heart rate 10 to 19 minutes per day.  She exercises with enough effort to increase her heart rate 4 days per week.   She is taking medications regularly.     Chief Complaint   Patient presents with     Headache     2-4 times a week, nausea, pain on the front sides of head, vision changes   4 episodes a week. Unilateral frontal headache pain, sick to her stomach, photosensitivity and sensitivity of noise. Sharp pain. Worsening in the last 2 months. No head injury recently. Pain scale 2-9/10 depending. Gets worse as the day goes on.     Review of Systems   Neurological: Positive for headaches.      Constitutional, HEENT, cardiovascular, pulmonary, gi and gu systems are negative, except as otherwise noted.      Objective    /87 (BP Location: Left arm, Patient Position: Sitting, Cuff Size: Adult Regular)   Pulse 115   Resp 16   Wt 58.5 kg (129 lb)   BMI 24.37 kg/m    Body mass index is 24.37 kg/m .     Physical Exam   GENERAL: healthy, alert and no distress  NECK: no adenopathy, no asymmetry, masses, or scars and thyroid normal to palpation  HEENT: normal. No TMJ pain.   RESP: lungs clear to auscultation - no rales, rhonchi or wheezes  CV: regular rate and rhythm, normal S1 S2, no S3 or S4, no murmur, click or rub, no peripheral edema and peripheral pulses strong  MS: no gross musculoskeletal defects noted, no edema  NEURO: Normal strength and tone, mentation intact and speech normal  PSYCH: mentation appears normal, affect normal/bright

## 2022-05-12 ENCOUNTER — OFFICE VISIT (OUTPATIENT)
Dept: FAMILY MEDICINE | Facility: CLINIC | Age: 27
End: 2022-05-12
Payer: COMMERCIAL

## 2022-05-12 VITALS
DIASTOLIC BLOOD PRESSURE: 87 MMHG | RESPIRATION RATE: 14 BRPM | HEART RATE: 110 BPM | WEIGHT: 130.6 LBS | BODY MASS INDEX: 24.68 KG/M2 | SYSTOLIC BLOOD PRESSURE: 117 MMHG

## 2022-05-12 DIAGNOSIS — G43.109 MIGRAINE WITH AURA AND WITHOUT STATUS MIGRAINOSUS, NOT INTRACTABLE: ICD-10-CM

## 2022-05-12 PROCEDURE — 99214 OFFICE O/P EST MOD 30 MIN: CPT | Performed by: FAMILY MEDICINE

## 2022-05-12 RX ORDER — TOPIRAMATE 100 MG/1
100 TABLET, FILM COATED ORAL AT BEDTIME
Qty: 90 TABLET | Refills: 1 | Status: SHIPPED | OUTPATIENT
Start: 2022-05-12 | End: 2022-11-10

## 2022-05-12 ASSESSMENT — ENCOUNTER SYMPTOMS: HEADACHES: 1

## 2022-05-12 ASSESSMENT — PATIENT HEALTH QUESTIONNAIRE - PHQ9
SUM OF ALL RESPONSES TO PHQ QUESTIONS 1-9: 4
SUM OF ALL RESPONSES TO PHQ QUESTIONS 1-9: 4
10. IF YOU CHECKED OFF ANY PROBLEMS, HOW DIFFICULT HAVE THESE PROBLEMS MADE IT FOR YOU TO DO YOUR WORK, TAKE CARE OF THINGS AT HOME, OR GET ALONG WITH OTHER PEOPLE: NOT DIFFICULT AT ALL

## 2022-05-12 ASSESSMENT — ANXIETY QUESTIONNAIRES
7. FEELING AFRAID AS IF SOMETHING AWFUL MIGHT HAPPEN: SEVERAL DAYS
1. FEELING NERVOUS, ANXIOUS, OR ON EDGE: NOT AT ALL
5. BEING SO RESTLESS THAT IT IS HARD TO SIT STILL: MORE THAN HALF THE DAYS
2. NOT BEING ABLE TO STOP OR CONTROL WORRYING: NOT AT ALL
7. FEELING AFRAID AS IF SOMETHING AWFUL MIGHT HAPPEN: SEVERAL DAYS
3. WORRYING TOO MUCH ABOUT DIFFERENT THINGS: NOT AT ALL
8. IF YOU CHECKED OFF ANY PROBLEMS, HOW DIFFICULT HAVE THESE MADE IT FOR YOU TO DO YOUR WORK, TAKE CARE OF THINGS AT HOME, OR GET ALONG WITH OTHER PEOPLE?: SOMEWHAT DIFFICULT
GAD7 TOTAL SCORE: 5
GAD7 TOTAL SCORE: 5
6. BECOMING EASILY ANNOYED OR IRRITABLE: SEVERAL DAYS
4. TROUBLE RELAXING: SEVERAL DAYS
GAD7 TOTAL SCORE: 5

## 2022-05-12 NOTE — PROGRESS NOTES
"  Assessment & Plan     Migraine with aura and without status migrainosus, not intractable  Improved from 5 episodes a week to 3 a week.  Review sumatriptan a few times and that made her sleepy but does work and nothing her migraines.  Discussed increasing topiramate 200 mg at nighttime.  She will send me a PEER message in a month for update and if not improving consider MRI and/or neurology referral next.  Patient agreed with plan.  Continue with the rest of her medications.  Follow-up in 6 months  - topiramate (TOPAMAX) 100 MG tablet  Dispense: 90 tablet; Refill: 1    I spent 20 minutes on this encounter, including collecting history from patient and reviewing records from previous providers, examining the patient, explaining and counseling the issues enumerated in the Assessment and Plan (patient given a copy), ordering indicated tests, ordering prescriptions.       Return in about 6 months (around 11/12/2022) for Follow up, with me, in person.    Radha Justin MD  [facility]    Cain Reed is a 26 year old who presents for the following health issues     Headache     History of Present Illness       Mental Health Follow-up:                    Today's PHQ-9         PHQ-9 Total Score: 4  PHQ-9 Q9 Thoughts of better off dead/self-harm past 2 weeks :   (P) Not at all    How difficult have these problems made it for you to do your work, take care of things at home, or get along with other people: Not difficult at all    Today's ALY-7 Score: 5    Migraines:   Since the patient's last clinic visit, headaches are: improved  The patient is getting headaches:  3  She is not able to do normal daily activities when she has a migraine.  The patient is taking the following rescue/relief medications:  Tylenol, Excedrin and sumatriptan (Imitrex)   Patient states \"The relief is inconsistent\" from the rescue/relief medications.   The patient is taking the following medications to " prevent migraines:  Topomax  In the past 4 weeks, the patient has gone to an Urgent Care or Emergency Room 0 times times due to headaches.    She eats 4 or more servings of fruits and vegetables daily.She consumes 1 sweetened beverage(s) daily.She exercises with enough effort to increase her heart rate 10 to 19 minutes per day.  She exercises with enough effort to increase her heart rate 4 days per week.   She is taking medications regularly.     Chief Complaint   Patient presents with     Headache     Was previously getting migraines about 5 times a week. Is now down to about 3 times a week        Did have dehydration with topiramate - dry mouth. Drinking water helped.    Review of Systems   Neurological: Positive for headaches.      Constitutional, HEENT, cardiovascular, pulmonary, gi and gu systems are negative, except as otherwise noted.      Objective    /87   Pulse 110   Resp 14   Wt 59.2 kg (130 lb 9.6 oz)   BMI 24.68 kg/m    Body mass index is 24.68 kg/m .  Physical Exam   GENERAL: healthy, alert and no distress  NECK: no adenopathy, no asymmetry, masses, or scars and thyroid normal to palpation  RESP: lungs clear to auscultation - no rales, rhonchi or wheezes  CV: regular rate and rhythm, normal S1 S2, no S3 or S4, no murmur, click or rub, no peripheral edema and peripheral pulses strong  ABDOMEN: soft, nontender, no hepatosplenomegaly, no masses and bowel sounds normal  MS: no gross musculoskeletal defects noted, no edema  NEURO: Normal strength and tone, mentation intact and speech normal  PSYCH: mentation appears normal, affect normal/bright

## 2022-05-13 ASSESSMENT — PATIENT HEALTH QUESTIONNAIRE - PHQ9: SUM OF ALL RESPONSES TO PHQ QUESTIONS 1-9: 4

## 2022-05-13 ASSESSMENT — ANXIETY QUESTIONNAIRES: GAD7 TOTAL SCORE: 5

## 2022-09-17 ENCOUNTER — HEALTH MAINTENANCE LETTER (OUTPATIENT)
Age: 27
End: 2022-09-17

## 2022-11-10 ENCOUNTER — OFFICE VISIT (OUTPATIENT)
Dept: FAMILY MEDICINE | Facility: CLINIC | Age: 27
End: 2022-11-10
Payer: COMMERCIAL

## 2022-11-10 VITALS
SYSTOLIC BLOOD PRESSURE: 122 MMHG | TEMPERATURE: 99.3 F | RESPIRATION RATE: 16 BRPM | HEIGHT: 61 IN | BODY MASS INDEX: 25.07 KG/M2 | DIASTOLIC BLOOD PRESSURE: 85 MMHG | HEART RATE: 94 BPM | OXYGEN SATURATION: 97 % | WEIGHT: 132.8 LBS

## 2022-11-10 DIAGNOSIS — H53.8 BLURRED VISION: ICD-10-CM

## 2022-11-10 DIAGNOSIS — Z13.220 SCREENING FOR HYPERLIPIDEMIA: ICD-10-CM

## 2022-11-10 DIAGNOSIS — G43.109 MIGRAINE WITH AURA AND WITHOUT STATUS MIGRAINOSUS, NOT INTRACTABLE: ICD-10-CM

## 2022-11-10 DIAGNOSIS — K30 FUNCTIONAL DYSPEPSIA: ICD-10-CM

## 2022-11-10 DIAGNOSIS — F33.9 EPISODE OF RECURRENT MAJOR DEPRESSIVE DISORDER, UNSPECIFIED DEPRESSION EPISODE SEVERITY (H): ICD-10-CM

## 2022-11-10 DIAGNOSIS — K58.1 IRRITABLE BOWEL SYNDROME WITH CONSTIPATION: ICD-10-CM

## 2022-11-10 DIAGNOSIS — Z00.00 ENCOUNTER FOR PREVENTATIVE ADULT HEALTH CARE EXAMINATION: Primary | ICD-10-CM

## 2022-11-10 DIAGNOSIS — K31.84 GASTROPARESIS: ICD-10-CM

## 2022-11-10 DIAGNOSIS — Z30.41 ORAL CONTRACEPTIVE PILL SURVEILLANCE: ICD-10-CM

## 2022-11-10 DIAGNOSIS — Z12.4 CERVICAL CANCER SCREENING: ICD-10-CM

## 2022-11-10 LAB
ALBUMIN SERPL BCG-MCNC: 4.3 G/DL (ref 3.5–5.2)
ALP SERPL-CCNC: 61 U/L (ref 35–104)
ALT SERPL W P-5'-P-CCNC: <5 U/L (ref 10–35)
ANION GAP SERPL CALCULATED.3IONS-SCNC: 9 MMOL/L (ref 7–15)
AST SERPL W P-5'-P-CCNC: 21 U/L (ref 10–35)
BILIRUB SERPL-MCNC: 0.4 MG/DL
BUN SERPL-MCNC: 11.1 MG/DL (ref 6–20)
CALCIUM SERPL-MCNC: 8.9 MG/DL (ref 8.6–10)
CHLORIDE SERPL-SCNC: 107 MMOL/L (ref 98–107)
CHOLEST SERPL-MCNC: 195 MG/DL
CREAT SERPL-MCNC: 0.83 MG/DL (ref 0.51–0.95)
DEPRECATED HCO3 PLAS-SCNC: 21 MMOL/L (ref 22–29)
ERYTHROCYTE [DISTWIDTH] IN BLOOD BY AUTOMATED COUNT: 12.1 % (ref 10–15)
GFR SERPL CREATININE-BSD FRML MDRD: >90 ML/MIN/1.73M2
GLUCOSE SERPL-MCNC: 84 MG/DL (ref 70–99)
HCT VFR BLD AUTO: 40.6 % (ref 35–47)
HDLC SERPL-MCNC: 58 MG/DL
HGB BLD-MCNC: 13.6 G/DL (ref 11.7–15.7)
LDLC SERPL CALC-MCNC: 122 MG/DL
MCH RBC QN AUTO: 30.9 PG (ref 26.5–33)
MCHC RBC AUTO-ENTMCNC: 33.5 G/DL (ref 31.5–36.5)
MCV RBC AUTO: 92 FL (ref 78–100)
NONHDLC SERPL-MCNC: 137 MG/DL
PLATELET # BLD AUTO: 313 10E3/UL (ref 150–450)
POTASSIUM SERPL-SCNC: 4.6 MMOL/L (ref 3.4–5.3)
PROT SERPL-MCNC: 7 G/DL (ref 6.4–8.3)
RBC # BLD AUTO: 4.4 10E6/UL (ref 3.8–5.2)
SODIUM SERPL-SCNC: 137 MMOL/L (ref 136–145)
TRIGL SERPL-MCNC: 76 MG/DL
TSH SERPL DL<=0.005 MIU/L-ACNC: 1.32 UIU/ML (ref 0.3–4.2)
WBC # BLD AUTO: 6 10E3/UL (ref 4–11)

## 2022-11-10 PROCEDURE — 36415 COLL VENOUS BLD VENIPUNCTURE: CPT | Performed by: FAMILY MEDICINE

## 2022-11-10 PROCEDURE — 84443 ASSAY THYROID STIM HORMONE: CPT | Performed by: FAMILY MEDICINE

## 2022-11-10 PROCEDURE — 82306 VITAMIN D 25 HYDROXY: CPT | Performed by: FAMILY MEDICINE

## 2022-11-10 PROCEDURE — 80053 COMPREHEN METABOLIC PANEL: CPT | Performed by: FAMILY MEDICINE

## 2022-11-10 PROCEDURE — 99395 PREV VISIT EST AGE 18-39: CPT | Performed by: FAMILY MEDICINE

## 2022-11-10 PROCEDURE — 85027 COMPLETE CBC AUTOMATED: CPT | Performed by: FAMILY MEDICINE

## 2022-11-10 PROCEDURE — 88141 CYTOPATH C/V INTERPRET: CPT | Performed by: PATHOLOGY

## 2022-11-10 PROCEDURE — 99214 OFFICE O/P EST MOD 30 MIN: CPT | Mod: 25 | Performed by: FAMILY MEDICINE

## 2022-11-10 PROCEDURE — 88175 CYTOPATH C/V AUTO FLUID REDO: CPT | Performed by: FAMILY MEDICINE

## 2022-11-10 PROCEDURE — 80061 LIPID PANEL: CPT | Performed by: FAMILY MEDICINE

## 2022-11-10 PROCEDURE — 87624 HPV HI-RISK TYP POOLED RSLT: CPT | Performed by: FAMILY MEDICINE

## 2022-11-10 RX ORDER — TOPIRAMATE 50 MG/1
75 TABLET, FILM COATED ORAL AT BEDTIME
Qty: 135 TABLET | Refills: 0 | Status: SHIPPED | OUTPATIENT
Start: 2022-11-10 | End: 2023-01-02 | Stop reason: SINTOL

## 2022-11-10 RX ORDER — NORETHINDRONE ACETATE AND ETHINYL ESTRADIOL, ETHINYL ESTRADIOL AND FERROUS FUMARATE 1MG-10(24)
1 KIT ORAL DAILY
Qty: 90 TABLET | Refills: 3 | Status: SHIPPED | OUTPATIENT
Start: 2022-11-10 | End: 2023-12-05

## 2022-11-10 ASSESSMENT — ENCOUNTER SYMPTOMS
MYALGIAS: 0
SORE THROAT: 0
FEVER: 0
HEADACHES: 0
EYE PAIN: 0
ABDOMINAL PAIN: 0
ARTHRALGIAS: 0
DIARRHEA: 0
COUGH: 0
DIZZINESS: 0
NERVOUS/ANXIOUS: 0
WEAKNESS: 0
CHILLS: 0
NAUSEA: 1
BREAST MASS: 0
PALPITATIONS: 0
CONSTIPATION: 0
HEMATOCHEZIA: 0
PARESTHESIAS: 0
FREQUENCY: 0
JOINT SWELLING: 0
HEMATURIA: 0
SHORTNESS OF BREATH: 0
HEARTBURN: 0
DYSURIA: 0

## 2022-11-10 ASSESSMENT — ASTHMA QUESTIONNAIRES
ACT_TOTALSCORE: 25
QUESTION_4 LAST FOUR WEEKS HOW OFTEN HAVE YOU USED YOUR RESCUE INHALER OR NEBULIZER MEDICATION (SUCH AS ALBUTEROL): NOT AT ALL
QUESTION_5 LAST FOUR WEEKS HOW WOULD YOU RATE YOUR ASTHMA CONTROL: COMPLETELY CONTROLLED
QUESTION_2 LAST FOUR WEEKS HOW OFTEN HAVE YOU HAD SHORTNESS OF BREATH: NOT AT ALL
QUESTION_3 LAST FOUR WEEKS HOW OFTEN DID YOUR ASTHMA SYMPTOMS (WHEEZING, COUGHING, SHORTNESS OF BREATH, CHEST TIGHTNESS OR PAIN) WAKE YOU UP AT NIGHT OR EARLIER THAN USUAL IN THE MORNING: NOT AT ALL
QUESTION_1 LAST FOUR WEEKS HOW MUCH OF THE TIME DID YOUR ASTHMA KEEP YOU FROM GETTING AS MUCH DONE AT WORK, SCHOOL OR AT HOME: NONE OF THE TIME
ACT_TOTALSCORE: 25

## 2022-11-10 NOTE — PATIENT INSTRUCTIONS
Send me a mychart message in 2-3 weeks to update me about blurry vision with decreased dose of topiramate.

## 2022-11-10 NOTE — PROGRESS NOTES
SUBJECTIVE:   CC: Brianna is an 27 year old who presents for preventive health visit.       Patient has been advised of split billing requirements and indicates understanding: Yes  Healthy Habits:     Getting at least 3 servings of Calcium per day:  Yes    Bi-annual eye exam:  Yes    Dental care twice a year:  Yes    Sleep apnea or symptoms of sleep apnea:  None    Diet:  Other    Frequency of exercise:  2-3 days/week    Duration of exercise:  15-30 minutes    Taking medications regularly:  No    Barriers to taking medications:  None    Medication side effects:  Other    PHQ-2 Total Score: 0    Additional concerns today:  No      Chief Complaint   Patient presents with     Physical     See psychiatry on a regular basis.  Has h/o IBS, predominantly constipation with functional dyspepsia gastroparesis and has been referred to GI.  Has taken Zofran a couple times a year previously.  Takes laxative as needed for constipation.  She is on birth control.  She needs another Pap smear this year. IBS has been well controlled after she cut down sugars and eats more fruits/veggies.     Today's PHQ-2 Score:   PHQ-2 ( 1999 Pf[employer]) 11/10/2022   Q1: Little interest or pleasure in doing things 0   Q2: Feeling down, depressed or hopeless 0   PHQ-2 Score 0   Q1: Little interest or pleasure in doing things Not at all   Q2: Feeling down, depressed or hopeless Not at all   PHQ-2 Score 0       Abuse: Current or Past (Physical, Sexual or Emotional) - No  Do you feel safe in your environment? Yes        Social History     Tobacco Use     Smoking status: Never     Smokeless tobacco: Never   Substance Use Topics     Alcohol use: Yes     Alcohol/week: 0.0 - 3.0 standard drinks         Alcohol Use 11/10/2022   Prescreen: >3 drinks/day or >7 drinks/week? No       Reviewed orders with patient.  Reviewed health maintenance and updated orders accordingly - Yes  Lab work is in process    Breast Cancer Screening:    Breast CA Risk Assessment  "(FHS-7) 11/8/2021   Do you have a family history of breast, colon, or ovarian cancer? No / Unknown         Patient under 40 years of age: Routine Mammogram Screening not recommended.   Pertinent mammograms are reviewed under the imaging tab.    History of abnormal Pap smear: YES - updated in Problem List and Health Maintenance accordingly  PAP / HPV Latest Ref Rng & Units 11/8/2021   PAP   Low-grade squamous intraepithelial lesion (LSIL) encompassing HPV/mild dysplasia/CIN1(A)     Reviewed and updated as needed this visit by clinical staff    Allergies               Reviewed and updated as needed this visit by Provider                   Past Medical History:   Diagnosis Date     Anxiety      Controlled substance agreement signed 4/29/2019     IBS (irritable bowel syndrome)       History reviewed. No pertinent surgical history.  OB History   No obstetric history on file.       Review of Systems   Constitutional: Negative for chills and fever.   HENT: Negative for congestion, ear pain, hearing loss and sore throat.    Eyes: Positive for visual disturbance. Negative for pain.   Respiratory: Negative for cough and shortness of breath.    Cardiovascular: Negative for chest pain, palpitations and peripheral edema.   Gastrointestinal: Positive for nausea. Negative for abdominal pain, constipation, diarrhea, heartburn and hematochezia.   Breasts:  Negative for tenderness, breast mass and discharge.   Genitourinary: Positive for vaginal bleeding. Negative for dysuria, frequency, genital sores, hematuria, pelvic pain, urgency and vaginal discharge.   Musculoskeletal: Negative for arthralgias, joint swelling and myalgias.   Skin: Negative for rash.   Neurological: Negative for dizziness, weakness, headaches and paresthesias.   Psychiatric/Behavioral: Negative for mood changes. The patient is not nervous/anxious.           OBJECTIVE:   /85   Pulse 94   Temp 99.3  F (37.4  C) (Oral)   Resp 16   Ht 1.537 m (5' 0.5\")   " Wt 60.2 kg (132 lb 12.8 oz)   SpO2 97%   BMI 25.51 kg/m    Physical Exam  GENERAL: healthy, alert and no distress  EYES: Eyes grossly normal to inspection, PERRL and conjunctivae and sclerae normal  HENT: ear canals and TM's normal, nose and mouth without ulcers or lesions  NECK: no adenopathy, no asymmetry, masses, or scars and thyroid normal to palpation  RESP: lungs clear to auscultation - no rales, rhonchi or wheezes  BREAST: normal without masses, tenderness or nipple discharge and no palpable axillary masses or adenopathy  CV: regular rate and rhythm, normal S1 S2, no S3 or S4, no murmur, click or rub, no peripheral edema and peripheral pulses strong  ABDOMEN: soft, nontender, no hepatosplenomegaly, no masses and bowel sounds normal   (female): normal female external genitalia, normal urethral meatus, vaginal mucosa pink, moist, well rugated, and normal cervix/adnexa/uterus without masses or discharge  MS: no gross musculoskeletal defects noted, no edema  SKIN: no suspicious lesions or rashes  NEURO: Normal strength and tone, mentation intact and speech normal  PSYCH: mentation appears normal, affect normal/bright    Diagnostic Test Results:  Labs reviewed in Epic    ASSESSMENT/PLAN:   Brianna was seen today for physical.    Diagnoses and all orders for this visit:    Encounter for preventative adult health care examination  Routine history and physical, updated in EMR.  Pap smear done today.  Follow-up in a year for next annual physical    Cervical cancer screening  -     Pap Screen reflex to HPV if ASCUS - recommend age 25 - 29    Oral contraceptive pill surveillance  Skips sugar pills.  Does have breakthrough bleeding every couple months.  -     Norethin-Eth Estrad-Fe Biphas (LO LOESTRIN FE) 1 MG-10 MCG / 10 MCG TABS; Take 1 tablet by mouth daily    Migraine with aura and without status migrainosus, not intractable  Has been started on topiramate 100 mg at nighttime and this has improved her migraines  significantly.  She only needed to take Imitrex a couple times in the past few months.  However with complaining of lower vision in the last month.  We will need to decrease the dose to 75 mg for now and she will send a DeNA message in 2 to 3 weeks to let me know if her blurred vision that lasts a couple hours in the morning resolves. If not will need to decrease topiramate dose further.   -     Comprehensive metabolic panel (BMP + Alb, Alk Phos, ALT, AST, Total. Bili, TP); Future  -     CBC with platelets; Future  -     TSH with free T4 reflex; Future  -     Vitamin D Deficiency; Future  -     topiramate (TOPAMAX) 50 MG tablet; Take 1.5 tablets (75 mg) by mouth At Bedtime    Irritable bowel syndrome with constipation  Gastroparesis  Functional dyspepsia  Has improved with her cutting down on added sugars and focusing on eating more fruits and veggies.  -     Comprehensive metabolic panel (BMP + Alb, Alk Phos, ALT, AST, Total. Bili, TP); Future  -     TSH with free T4 reflex; Future    Episode of recurrent major depressive disorder, unspecified depression episode severity (H)  Seeing psychiatry.  -     Comprehensive metabolic panel (BMP + Alb, Alk Phos, ALT, AST, Total. Bili, TP); Future  -     TSH with free T4 reflex; Future  -     Vitamin D Deficiency; Future    Screening for hyperlipidemia  -     Lipid panel; Future    Blurred vision  Started within the last month. She can drive to work but can't read traffic signs then vision would become more clear after a couple hours from getting up in the morning. We will decrease topiramate to 75 mg at nighttime.  She needs to get into see the ophthalmologist sooner than later.  She will let me know in 2 to 3 weeks how vision improves with the lower dose of topiramate if not improving will cut down to 50 mg next.  -     Adult Eye  Referral; Future    Other orders  -     REVIEW OF HEALTH MAINTENANCE PROTOCOL ORDERS        Patient has been advised of split billing  "requirements and indicates understanding: Yes      COUNSELING:  Reviewed preventive health counseling, as reflected in patient instructions       Regular exercise       Healthy diet/nutrition    Estimated body mass index is 25.51 kg/m  as calculated from the following:    Height as of this encounter: 1.537 m (5' 0.5\").    Weight as of this encounter: 60.2 kg (132 lb 12.8 oz).    Weight management plan: Discussed healthy diet and exercise guidelines    She reports that she has never smoked. She has never used smokeless tobacco.      Counseling Resources:  ATP IV Guidelines  Pooled Cohorts Equation Calculator  Breast Cancer Risk Calculator  BRCA-Related Cancer Risk Assessment: FHS-7 Tool  FRAX Risk Assessment  ICSI Preventive Guidelines  Dietary Guidelines for Americans, 2010  USDA's MyPlate  ASA Prophylaxis  Lung CA Screening    Radha Justin MD  [facility]  "

## 2022-11-11 LAB — DEPRECATED CALCIDIOL+CALCIFEROL SERPL-MC: 21 UG/L (ref 20–75)

## 2022-11-15 LAB
BKR LAB AP GYN ADEQUACY: ABNORMAL
BKR LAB AP GYN INTERPRETATION: ABNORMAL
BKR LAB AP HPV REFLEX: ABNORMAL
BKR LAB AP PREVIOUS ABNL DX: ABNORMAL
BKR LAB AP PREVIOUS ABNORMAL: ABNORMAL
PATH REPORT.COMMENTS IMP SPEC: ABNORMAL
PATH REPORT.COMMENTS IMP SPEC: ABNORMAL
PATH REPORT.RELEVANT HX SPEC: ABNORMAL

## 2022-11-16 ENCOUNTER — PATIENT OUTREACH (OUTPATIENT)
Dept: FAMILY MEDICINE | Facility: CLINIC | Age: 27
End: 2022-11-16

## 2022-11-16 LAB
HUMAN PAPILLOMA VIRUS 16 DNA: NEGATIVE
HUMAN PAPILLOMA VIRUS 18 DNA: NEGATIVE
HUMAN PAPILLOMA VIRUS FINAL DIAGNOSIS: NORMAL
HUMAN PAPILLOMA VIRUS OTHER HR: NEGATIVE

## 2022-12-29 ENCOUNTER — MYC MEDICAL ADVICE (OUTPATIENT)
Dept: FAMILY MEDICINE | Facility: CLINIC | Age: 27
End: 2022-12-29

## 2022-12-29 DIAGNOSIS — G43.109 MIGRAINE WITH AURA AND WITHOUT STATUS MIGRAINOSUS, NOT INTRACTABLE: Primary | ICD-10-CM

## 2023-01-02 RX ORDER — GABAPENTIN 300 MG/1
300 CAPSULE ORAL AT BEDTIME
Qty: 90 CAPSULE | Refills: 1 | Status: SHIPPED | OUTPATIENT
Start: 2023-01-02 | End: 2023-01-10

## 2023-01-05 ENCOUNTER — MYC MEDICAL ADVICE (OUTPATIENT)
Dept: FAMILY MEDICINE | Facility: CLINIC | Age: 28
End: 2023-01-05

## 2023-01-10 ENCOUNTER — MYC MEDICAL ADVICE (OUTPATIENT)
Dept: FAMILY MEDICINE | Facility: CLINIC | Age: 28
End: 2023-01-10

## 2023-01-10 DIAGNOSIS — G43.109 MIGRAINE WITH AURA AND WITHOUT STATUS MIGRAINOSUS, NOT INTRACTABLE: ICD-10-CM

## 2023-01-10 RX ORDER — GABAPENTIN 100 MG/1
300 CAPSULE ORAL AT BEDTIME
Qty: 90 CAPSULE | Refills: 0 | Status: SHIPPED | OUTPATIENT
Start: 2023-01-10 | End: 2023-01-19

## 2023-01-18 ENCOUNTER — MYC MEDICAL ADVICE (OUTPATIENT)
Dept: FAMILY MEDICINE | Facility: CLINIC | Age: 28
End: 2023-01-18
Payer: COMMERCIAL

## 2023-01-18 DIAGNOSIS — G43.109 MIGRAINE WITH AURA AND WITHOUT STATUS MIGRAINOSUS, NOT INTRACTABLE: Primary | ICD-10-CM

## 2023-01-19 RX ORDER — TOPIRAMATE 25 MG/1
75 TABLET, FILM COATED ORAL DAILY
Qty: 90 TABLET | Refills: 0 | Status: SHIPPED | OUTPATIENT
Start: 2023-01-19 | End: 2023-03-27

## 2023-01-24 ASSESSMENT — HEADACHE IMPACT TEST (HIT 6)
HOW OFTEN HAVE YOU FELT TOO TIRED TO WORK BECAUSE OF YOUR HEADACHES: VERY OFTEN
HOW OFTEN HAVE YOU FELT FED UP OR IRRITATED BECAUSE OF YOUR HEADACHES: VERY OFTEN
HOW OFTEN DO HEADACHES LIMIT YOUR DAILY ACTIVITIES: VERY OFTEN
HOW OFTEN DID HEADACHS LIMIT CONCENTRATION ON WORK OR DAILY ACTIVITY: VERY OFTEN
WHEN YOU HAVE HEADACHES HOW OFTEN IS THE PAIN SEVERE: VERY OFTEN
WHEN YOU HAVE A HEADACHE HOW OFTEN DO YOU WISH YOU COULD LIE DOWN: ALWAYS
HIT6 TOTAL SCORE: 68

## 2023-01-25 ENCOUNTER — OFFICE VISIT (OUTPATIENT)
Dept: NEUROLOGY | Facility: CLINIC | Age: 28
End: 2023-01-25
Payer: COMMERCIAL

## 2023-01-25 VITALS — SYSTOLIC BLOOD PRESSURE: 121 MMHG | HEART RATE: 116 BPM | DIASTOLIC BLOOD PRESSURE: 87 MMHG | OXYGEN SATURATION: 96 %

## 2023-01-25 DIAGNOSIS — G43.009 MIGRAINE WITHOUT AURA AND WITHOUT STATUS MIGRAINOSUS, NOT INTRACTABLE: Primary | ICD-10-CM

## 2023-01-25 DIAGNOSIS — G44.209 TENSION HEADACHE: ICD-10-CM

## 2023-01-25 PROCEDURE — 99205 OFFICE O/P NEW HI 60 MIN: CPT | Performed by: PSYCHIATRY & NEUROLOGY

## 2023-01-25 RX ORDER — DULOXETIN HYDROCHLORIDE 30 MG/1
30 CAPSULE, DELAYED RELEASE ORAL 2 TIMES DAILY
COMMUNITY
Start: 2023-01-03 | End: 2024-03-05

## 2023-01-25 RX ORDER — SUMATRIPTAN 25 MG/1
25 TABLET, FILM COATED ORAL
Qty: 9 TABLET | Refills: 3 | Status: SHIPPED | OUTPATIENT
Start: 2023-01-25 | End: 2024-02-01

## 2023-01-25 RX ORDER — FREMANEZUMAB-VFRM 225 MG/1.5ML
225 INJECTION SUBCUTANEOUS
Qty: 1.5 ML | Refills: 11 | Status: SHIPPED | OUTPATIENT
Start: 2023-01-25 | End: 2023-04-17 | Stop reason: SINTOL

## 2023-01-25 NOTE — PROGRESS NOTES
"INITIAL NEUROLOGY CONSULTATION    DATE OF VISIT: 1/25/2023  CLINIC LOCATION: [facility]  MRN: [id number]  PATIENT NAME: Brianna Alarcon  YOB: 1995    REASON FOR VISIT:   Chief Complaint   Patient presents with     Consult     Migraine- history of migraines, they were well controlled but are now getting more frequent and more intense after she had to stop Topamax due to blurry vision      HISTORY OF PRESENT ILLNESS:                                                    Ms. Brianna Alarcon is 27 year old right handed female patient with past medical history of asthma, irritable bowel syndrome, and depression, who was seen today for headache management.    Per patient's report, she has longstanding history of migraines (\"as long as I could remember myself\").  They worsened last summer, and her primary care provider prescribed, Topamax which was helpful at certain dose, but caused blurry vision.  Eventually, this medication was discontinued, and she was switched to gabapentin that reduced efficacy of Adderall and was not helping the headaches.  She eventually switched back to Topamax at 75 mg daily, which helps some.  For acute therapy she uses 50 mg of Imitrex, but this dose makes her groggy for multiple hours.  For her mental health conditions, she is on Adderall, 450 mg of Wellbutrin daily and 30 mg of Cymbalta twice a day along with hydroxyzine as needed and trazodone for sleep.    At the present time, she reports that unilateral frontal (changing sides between attacks) sharp 5-9/10 headache occurs up to 3 times per week lasting until she goes to bed.  Associated with nausea.  It used to be 5-6 times per week, but frequency reduced with initiation of Topamax.  She experiences stronger headaches several times per month.  Besides nausea, she also reports light sensitivity and intolerance of physical activity.  Denies any focal neurological symptoms.    She reports that her sleep is " adequate, but occasionally she needs trazodone.  She eats 5 small meals during the day and drinks approximately 60 ounces of fluids in addition to 1 to 1.5 cups of coffee.  She rates her stress level as mild.  She denies any significant head injuries, seizures, or CNS infections.  She believes that she had an MRI in the past when she was 12 to 14 years old, which was reportedly normal.    Laboratory evaluation from November 2022 includes unremarkable CMP, elevated LDL (122), normal TSH (1.32), vitamin D (21), and CBC.    According to Care Everywhere, the patient was previously seen at neurology clinic in [place], [place] (2010).  At that time she was on 75 mg of Topamax twice daily along with 25 mg of nortriptyline, which was increased to 50 mg.  For acute therapy she was using Maxalt.  Head CT from September 2008 was negative for acute intracranial pathology.  PAST MEDICAL/SURGICAL HISTORY:                                                    I personally reviewed patient's past medical and surgical history with the patient at today's visit.  MEDICATIONS:                                                    I personally reviewed patient's medications and allergies with the patient at today's visit.  ALLERGIES:                                                      Allergies   Allergen Reactions     Aspirin Unknown     Drowsy, blurry vision     Oxycodone-Acetaminophen Nausea     Any pain meds make her sick and cause nausea     Trimethoprim Hives     Sulfamethoxazole Rash     Sulfamethoxazole-Trimethoprim [Sulfamethoxazole W/Trimethoprim] Rash and Hives     EXAM:                                                    VITAL SIGNS:   /87 (BP Location: Left arm, Patient Position: Sitting, Cuff Size: Adult Regular)   Pulse 116   SpO2 96%   Mini-Cog Assessment:  Mini Cog Assessment  Clock Draw Score: 2 Normal  3 Item Recall: 2 objects recalled  Mini Cog Total Score: 4  Administered by: : Erin MELTON    General: pt is in NAD,  cooperative.  Skin: normal turgor, moist mucous membranes, no lesions/rashes noticed.  HEENT: ATNC, EOMI, PERRL, white sclera, normal conjunctiva, no nystagmus or ptosis. No carotid bruits bilaterally.  Respiratory: lung sounds clear to auscultation bilaterally, no crackles, wheezes, rhonchi. Symmetric lung excursion, no accessory respiratory muscle use.  Cardiovascular: normal S1/S2, no murmurs/rubs/gallops.   Abdomen: Not distended.  : deferred.    Neurological:  Mental: alert, follows commands, Mini Cog Total Score: 4/5 with 2/3 on memory recall, no aphasia or dysarthria. Fund of knowledge is appropriate for age.  Cranial Nerves:  CN II: visual acuity - able to accurately count fingers with each eye. Visual fields intact, fundi: discs sharp, no papilledema and normal vessels bilaterally.  CN III, IV, VI: EOM intact, pupils equal and reactive  CN V: facial sensation nl  CN VII: face symmetric, no facial droop  CN VIII: hearing normal  CN IX: palate elevation symmetric, uvula at midline  CN XI SCM normal, shoulder shrug nl  CN XII: tongue midline  Motor: Strength: 5/5 in all major groups of all extremities. Normal tone. No abnormal movements. No pronator drift b/l.  Reflexes: Triceps, biceps, brachioradialis, patellar, and achilles reflexes normal and symmetric. No clonus noted. Toes are down-going b/l.   Sensory: light touch, pinprick, and vibration intact. Romberg: negative.  Coordination: FNF and heel-shin tests intact b/l.   Gait:  Normal, able to tandem walk without difficulty.  DATA:   LABS/EEG/IMAGING/OTHER STUDIES: I reviewed pertinent medical records, as detailed in the history of present illness.  ASSESSMENT AND PLAN:      ASSESSMENT: Brianna Alarcon is a 27 year old female patient with listed above past medical history, who presents with persistent headaches that worsened over the last summer.    We had a prolonged discussion with the patient regarding her symptoms.  Her neurologic exam today is  non-focal.  Her remote head CT was negative for acute findings, and she also reports normal brain MRI when she was 12 to 14 years old.  Currently, she does not have indications to repeat brain imaging.  Brain MRI without contrast might be considered in the future if her headaches continue to worsen, become resistant to treatment, or the patient develops focal neurological symptoms.    The clinical presentation is most likely consistent with multifactorial headache disorder, including migrainous and possibly tension type components.  We reviewed in detail these diagnoses, available treatment options, and the plan, as summarized below.      We also discussed that Wellbutrin is often associated with headaches/migraines (25 to 33%), and the dose reduction could be considered under the guidance of the prescribing provider.    DIAGNOSES:    ICD-10-CM    1. Migraine without aura and without status migrainosus, not intractable  G43.009 Fremanezumab-vfrm (AJOVY) 225 MG/1.5ML SOAJ     SUMAtriptan (IMITREX) 25 MG tablet      2. Tension headache  G44.209         PLAN: At today's visit we thoroughly discussed various diagnostic possibilities for patient's symptoms that are most likely consistent with migraines with possible additional component of tension headaches.  We also reviewed available treatment options, and the plan.    For headache prevention:  1.  Ajovy 225 mg subcutaneously every month.  Advised the patient to contact my clinic with any intolerable side effects and give me an update in 4-6 weeks after starting this medication.  2.  She will continue topiramate without changes at 75 mg daily.  We might reduce the dose or stop it in the future if headaches become well controlled on new medication.  3.  Other future options include Effexor and doxepin (if other antidepressants are reduced or discontinued), Depakote, verapamil, other CGRP antagonists, and neurostimulator devices (Cefaly or Nerivio).  Propranolol should  not be used due to history of asthma.  She previously tried nortriptyline, but it was not helpful.  For acute headache therapy:  1.  Sumatriptan 25 mg at the onset of intolerable headache. Limit use to less than 9 days/month.  2. May also use Excedrin or Tylenol, but should take it with food and limit use of all analgesics to less that 15 days/month.    Reviewed non-pharmacological headache prevention measures include proper sleep hygiene, regular meals, adequate hydration, regular aerobic exercise, stress reduction techniques, and limiting caffeine intake.    Advised the patient to keep the headache diary (paper or Migraine Hans) and bring it to the next follow up visit or upload it via My Chart.    Next follow-up appointment is in the next 3 months or earlier if needed.    Total Time: 69 minutes spent on the date of the encounter doing chart review, history and exam, documentation and further activities per the note.    Fernando Ramos MD  [facility]  (Chart documentation was completed in part with Dragon voice-recognition software. Even though reviewed, some grammatical, spelling, and word errors may remain.)

## 2023-01-25 NOTE — PROGRESS NOTES
"Brianna Alarcon is a 27 year old female who presents for:  Chief Complaint   Patient presents with     Consult     Migraine- history of migraines, they were well controlled but are now getting more frequent and more intense after she had to stop Topamax due to blurry vision         Initial Vitals:  /87 (BP Location: Left arm, Patient Position: Sitting, Cuff Size: Adult Regular)   Pulse 116   SpO2 96%  Estimated body mass index is 25.51 kg/m  as calculated from the following:    Height as of 11/10/22: 1.537 m (5' 0.5\").    Weight as of 11/10/22: 60.2 kg (132 lb 12.8 oz).. There is no height or weight on file to calculate BSA. BP completed using cuff size: judith Dong  "

## 2023-01-25 NOTE — PATIENT INSTRUCTIONS
AFTER VISIT SUMMARY (AVS):    At today's visit we thoroughly discussed various diagnostic possibilities for your symptoms that are most likely consistent with migraines with possible additional component of tension headaches.  We also reviewed available treatment options, and the plan.    For headache prevention:  1.  Ajovy 225 mg subcutaneously every month.  Please contact my clinic with any intolerable side effects and give me an update in 4-6 weeks after starting this medication.  2.  Continue topiramate without changes at 75 mg daily.  We might reduce the dose or stop it in the future if your headaches become well controlled on new medication.  3.  Other future options include Effexor, doxepin, Depakote, verapamil, other CGRP antagonists, and neurostimulator devices (Cefaly or Nerivio).  For acute headache therapy:  1.  Sumatriptan 25 mg at the onset of intolerable headache. Limit use to less than 9 days/month.  2. May also use Excedrin or Tylenol, but should take it with food and limit use of all analgesics to less that 15 days/month.    Reviewed non-pharmacological headache prevention measures include proper sleep hygiene, regular meals, adequate hydration, regular aerobic exercise, stress reduction techniques, and limiting caffeine intake.    Please keep the headache diary (paper or Migraine Hans) and bring it to the next follow up visit or upload it via My Chart.    Next follow-up appointment is in the next 3 months or earlier if needed.    Please do not hesitate to call me with any questions or concerns.    Thanks.

## 2023-01-25 NOTE — PROGRESS NOTES
INITIAL NEUROLOGY CONSULTATION    DATE OF VISIT: 1/25/2023  CLINIC LOCATION: [facility]  MRN: [id number]  PATIENT NAME: Brianna Alarcon  YOB: 1995    REASON FOR VISIT: No chief complaint on file.    HISTORY OF PRESENT ILLNESS:                                                    Ms. Brianna Alarcon is 27 year old right handed female patient with past medical history of asthma, irritable bowel syndrome, and depression, who was seen today for headache management.    Per patient's report, ***.  For headache prevention she is on 75 mg of topiramate daily.  For acute therapy she uses 50 mg of Imitrex.  For her mental health conditions, she is on Adderall, 450 mg of Wellbutrin daily and 20 mg of Cymbalta twice a day along with hydroxyzine as needed and trazodone for sleep.    Laboratory evaluation from November 2022 includes unremarkable CMP, elevated LDL (122), normal TSH (1.32), vitamin D (21), and CBC.    No prior brain imaging.    According to Care Everywhere, the patient was previously seen at neurology clinic in [place], [place] (2010).  At that time she was on 75 mg of Topamax twice daily along with 25 mg of nortriptyline, which was increased to 50.  For acute therapy she was using Maxalt.  Head CT from September 2008 was negative for acute intracranial pathology.  PAST MEDICAL/SURGICAL HISTORY:                                                    I personally reviewed patient's past medical and surgical history with the patient at today's visit.  MEDICATIONS:                                                    I personally reviewed patient's medications and allergies with the patient at today's visit.  ALLERGIES:                                                      Allergies   Allergen Reactions     Aspirin Unknown     Drowsy, blurry vision     Oxycodone-Acetaminophen Nausea     Any pain meds make her sick and cause nausea     Trimethoprim Hives     Sulfamethoxazole Rash      Sulfamethoxazole-Trimethoprim [Sulfamethoxazole W/Trimethoprim] Rash and Hives     EXAM:                                                    VITAL SIGNS:   There were no vitals taken for this visit.  Mini-Cog Assessment:       General: pt is in NAD, cooperative.  Skin: normal turgor, moist mucous membranes, no lesions/rashes noticed.  HEENT: ATNC, EOMI, PERRL, white sclera, normal conjunctiva, no nystagmus or ptosis. No carotid bruits bilaterally.  Respiratory: lung sounds clear to auscultation bilaterally, no crackles, wheezes, rhonchi. Symmetric lung excursion, no accessory respiratory muscle use.  Cardiovascular: normal S1/S2, no murmurs/rubs/gallops.   Abdomen: Not distended.  : deferred.    Neurological:  Mental: alert, follows commands,  /5 with ***/3 on memory recall, no aphasia or dysarthria. Fund of knowledge is {MYAPPROPRIATE:337206}  Cranial Nerves:  CN II: visual acuity - able to accurately count fingers with each eye. Visual fields intact, fundi: discs sharp, no papilledema and normal vessels bilaterally.  CN III, IV, VI: EOM intact, pupils equal and reactive  CN V: facial sensation nl  CN VII: face symmetric, no facial droop  CN VIII: hearing normal  CN IX: palate elevation symmetric, uvula at midline  CN XI SCM normal, shoulder shrug nl  CN XII: tongue midline  Motor: Strength: 5/5 in all major groups of all extremities. Normal tone. No abnormal movements. No pronator drift b/l.  Reflexes: Triceps, biceps, brachioradialis, patellar, and achilles reflexes normal and symmetric. No clonus noted. Toes are down-going b/l.   Sensory: light touch, pinprick, and vibration intact. Romberg: negative.  Coordination: FNF and heel-shin tests intact b/l.   Gait:  Normal, able to tandem walk *** without difficulty.  DATA:   LABS/EEG/IMAGING/OTHER STUDIES: I reviewed pertinent medical records, as detailed in the history of present illness.  ASSESSMENT AND PLAN:      ASSESSMENT: Brianna Alarcon is a 27 year old  female patient with listed above past medical history, who presents with ***.    We had a prolonged discussion with the patient regarding her symptoms.  Her neurologic exam today is non-focal.  Her remote head CT was negative for acute findings, and currently does not have indications to repeat brain imaging.  Brain MRI without contrast might be considered in the future if her headaches continue to worsen, become resistant to treatment, or the patient develops focal neurological symptoms.    The clinical presentation is most likely consistent with multifactorial headache disorder, including migrainous and tension type components.  We reviewed in detail these diagnoses, available treatment options, and the plan, as summarized below.    DIAGNOSES:  No diagnosis found.  PLAN: There are no Patient Instructions on file for this visit.    Total Time: *** minutes spent on the date of the encounter doing chart review, history and exam, documentation and further activities per the note.    Fernando Ramos MD  [facility] Neurology  (Chart documentation was completed in part with Dragon voice-recognition software. Even though reviewed, some grammatical, spelling, and word errors may remain.)

## 2023-01-25 NOTE — LETTER
"    1/25/2023         RE: Brianna Alarcon  [address] [facility] [address]        Dear Colleague,    Thank you for referring your patient, Brianna Alarcon, to the [facility]. Please see a copy of my visit note below.    INITIAL NEUROLOGY CONSULTATION    DATE OF VISIT: 1/25/2023  CLINIC LOCATION: [facility]  MRN: [id number]  PATIENT NAME: Brianna Alarcon  YOB: 1995    REASON FOR VISIT:   Chief Complaint   Patient presents with     Consult     Migraine- history of migraines, they were well controlled but are now getting more frequent and more intense after she had to stop Topamax due to blurry vision      HISTORY OF PRESENT ILLNESS:                                                    Ms. Brianna Alarcon is 27 year old right handed female patient with past medical history of asthma, irritable bowel syndrome, and depression, who was seen today for headache management.    Per patient's report, she has longstanding history of migraines (\"as long as I could remember myself\").  They worsened last summer, and her primary care provider prescribed, Topamax which was helpful at certain dose, but caused blurry vision.  Eventually, this medication was discontinued, and she was switched to gabapentin that reduced efficacy of Adderall and was not helping the headaches.  She eventually switched back to Topamax at 75 mg daily, which helps some.  For acute therapy she uses 50 mg of Imitrex, but this dose makes her groggy for multiple hours.  For her mental health conditions, she is on Adderall, 450 mg of Wellbutrin daily and 30 mg of Cymbalta twice a day along with hydroxyzine as needed and trazodone for sleep.    At the present time, she reports that unilateral frontal (changing sides between attacks) sharp 5-9/10 headache occurs up to 3 times per week lasting until she goes to bed.  Associated with nausea.  It used to be 5-6 times per week, but frequency " reduced with initiation of Topamax.  She experiences stronger headaches several times per month.  Besides nausea, she also reports light sensitivity and intolerance of physical activity.  Denies any focal neurological symptoms.    She reports that her sleep is adequate, but occasionally she needs trazodone.  She eats 5 small meals during the day and drinks approximately 60 ounces of fluids in addition to 1 to 1.5 cups of coffee.  She rates her stress level as mild.  She denies any significant head injuries, seizures, or CNS infections.  She believes that she had an MRI in the past when she was 12 to 14 years old, which was reportedly normal.    Laboratory evaluation from November 2022 includes unremarkable CMP, elevated LDL (122), normal TSH (1.32), vitamin D (21), and CBC.    According to Care Everywhere, the patient was previously seen at neurology clinic in [place], [place] (2010).  At that time she was on 75 mg of Topamax twice daily along with 25 mg of nortriptyline, which was increased to 50 mg.  For acute therapy she was using Maxalt.  Head CT from September 2008 was negative for acute intracranial pathology.  PAST MEDICAL/SURGICAL HISTORY:                                                    I personally reviewed patient's past medical and surgical history with the patient at today's visit.  MEDICATIONS:                                                    I personally reviewed patient's medications and allergies with the patient at today's visit.  ALLERGIES:                                                      Allergies   Allergen Reactions     Aspirin Unknown     Drowsy, blurry vision     Oxycodone-Acetaminophen Nausea     Any pain meds make her sick and cause nausea     Trimethoprim Hives     Sulfamethoxazole Rash     Sulfamethoxazole-Trimethoprim [Sulfamethoxazole W/Trimethoprim] Rash and Hives     EXAM:                                                    VITAL SIGNS:   /87 (BP Location: Left arm,  Patient Position: Sitting, Cuff Size: Adult Regular)   Pulse 116   SpO2 96%   Mini-Cog Assessment:  Mini Cog Assessment  Clock Draw Score: 2 Normal  3 Item Recall: 2 objects recalled  Mini Cog Total Score: 4  Administered by: : Erin MELTON    General: pt is in NAD, cooperative.  Skin: normal turgor, moist mucous membranes, no lesions/rashes noticed.  HEENT: ATNC, EOMI, PERRL, white sclera, normal conjunctiva, no nystagmus or ptosis. No carotid bruits bilaterally.  Respiratory: lung sounds clear to auscultation bilaterally, no crackles, wheezes, rhonchi. Symmetric lung excursion, no accessory respiratory muscle use.  Cardiovascular: normal S1/S2, no murmurs/rubs/gallops.   Abdomen: Not distended.  : deferred.    Neurological:  Mental: alert, follows commands, Mini Cog Total Score: 4/5 with 2/3 on memory recall, no aphasia or dysarthria. Fund of knowledge is appropriate for age.  Cranial Nerves:  CN II: visual acuity - able to accurately count fingers with each eye. Visual fields intact, fundi: discs sharp, no papilledema and normal vessels bilaterally.  CN III, IV, VI: EOM intact, pupils equal and reactive  CN V: facial sensation nl  CN VII: face symmetric, no facial droop  CN VIII: hearing normal  CN IX: palate elevation symmetric, uvula at midline  CN XI SCM normal, shoulder shrug nl  CN XII: tongue midline  Motor: Strength: 5/5 in all major groups of all extremities. Normal tone. No abnormal movements. No pronator drift b/l.  Reflexes: Triceps, biceps, brachioradialis, patellar, and achilles reflexes normal and symmetric. No clonus noted. Toes are down-going b/l.   Sensory: light touch, pinprick, and vibration intact. Romberg: negative.  Coordination: FNF and heel-shin tests intact b/l.   Gait:  Normal, able to tandem walk without difficulty.  DATA:   LABS/EEG/IMAGING/OTHER STUDIES: I reviewed pertinent medical records, as detailed in the history of present illness.  ASSESSMENT AND PLAN:      ASSESSMENT: Brianna MCCARTHY  Agnes is a 27 year old female patient with listed above past medical history, who presents with persistent headaches that worsened over the last summer.    We had a prolonged discussion with the patient regarding her symptoms.  Her neurologic exam today is non-focal.  Her remote head CT was negative for acute findings, and she also reports normal brain MRI when she was 12 to 14 years old.  Currently, she does not have indications to repeat brain imaging.  Brain MRI without contrast might be considered in the future if her headaches continue to worsen, become resistant to treatment, or the patient develops focal neurological symptoms.    The clinical presentation is most likely consistent with multifactorial headache disorder, including migrainous and possibly tension type components.  We reviewed in detail these diagnoses, available treatment options, and the plan, as summarized below.      We also discussed that Wellbutrin is often associated with headaches/migraines (25 to 33%), and the dose reduction could be considered under the guidance of the prescribing provider.    DIAGNOSES:    ICD-10-CM    1. Migraine without aura and without status migrainosus, not intractable  G43.009 Fremanezumab-vfrm (AJOVY) 225 MG/1.5ML SOAJ     SUMAtriptan (IMITREX) 25 MG tablet      2. Tension headache  G44.209         PLAN: At today's visit we thoroughly discussed various diagnostic possibilities for patient's symptoms that are most likely consistent with migraines with possible additional component of tension headaches.  We also reviewed available treatment options, and the plan.    For headache prevention:  1.  Ajovy 225 mg subcutaneously every month.  Advised the patient to contact my clinic with any intolerable side effects and give me an update in 4-6 weeks after starting this medication.  2.  She will continue topiramate without changes at 75 mg daily.  We might reduce the dose or stop it in the future if headaches become  well controlled on new medication.  3.  Other future options include Effexor and doxepin (if other antidepressants are reduced or discontinued), Depakote, verapamil, other CGRP antagonists, and neurostimulator devices (Cefaly or Nerivio).  Propranolol should not be used due to history of asthma.  She previously tried nortriptyline, but it was not helpful.  For acute headache therapy:  1.  Sumatriptan 25 mg at the onset of intolerable headache. Limit use to less than 9 days/month.  2. May also use Excedrin or Tylenol, but should take it with food and limit use of all analgesics to less that 15 days/month.    Reviewed non-pharmacological headache prevention measures include proper sleep hygiene, regular meals, adequate hydration, regular aerobic exercise, stress reduction techniques, and limiting caffeine intake.    Advised the patient to keep the headache diary (paper or Migraine Hans) and bring it to the next follow up visit or upload it via My Chart.    Next follow-up appointment is in the next 3 months or earlier if needed.    Total Time: 69 minutes spent on the date of the encounter doing chart review, history and exam, documentation and further activities per the note.    Fernando Ramos MD  [facility]  (Chart documentation was completed in part with Dragon voice-recognition software. Even though reviewed, some grammatical, spelling, and word errors may remain.)          Brianna Alarcon is a 27 year old female who presents for:  Chief Complaint   Patient presents with     Consult     Migraine- history of migraines, they were well controlled but are now getting more frequent and more intense after she had to stop Topamax due to blurry vision         Initial Vitals:  /87 (BP Location: Left arm, Patient Position: Sitting, Cuff Size: Adult Regular)   Pulse 116   SpO2 96%  Estimated body mass index is 25.51 kg/m  as calculated from the following:    Height as of 11/10/22: 1.537 m  "(5' 0.5\").    Weight as of 11/10/22: 60.2 kg (132 lb 12.8 oz).. There is no height or weight on file to calculate BSA. BP completed using cuff size: judith Dong      Again, thank you for allowing me to participate in the care of your patient.        Sincerely,        Fernando Ramos MD    "

## 2023-03-27 ENCOUNTER — MYC MEDICAL ADVICE (OUTPATIENT)
Dept: NEUROLOGY | Facility: CLINIC | Age: 28
End: 2023-03-27
Payer: COMMERCIAL

## 2023-03-27 NOTE — TELEPHONE ENCOUNTER
Agree with your recommendations to see if it spreading and to go to urgent care in such case.  Should not continue Ajovy in the future.  We could discuss alternative treatment options when she comes in April.  I could also see her sooner if I have any cancellations.   Thanks,   Fernando Ramos MD.

## 2023-03-27 NOTE — TELEPHONE ENCOUNTER
Please see pt Yu Rongt message, with pictures of reaction to Ajovy injection, from Friday.      Treva MADRIGAL RN, BSN  [facility]

## 2023-04-14 NOTE — PROGRESS NOTES
ESTABLISHED PATIENT NEUROLOGY NOTE    DATE OF VISIT: 4/17/2023  CLINIC LOCATION: [facility]  MRN: [id number]  PATIENT NAME: Brianna Alarcon  YOB: 1995    REASON FOR VISIT: No chief complaint on file.    SUBJECTIVE:                                                      HISTORY OF PRESENT ILLNESS: Patient is here to follow up regarding multifactorial headaches. Please refer to my initial note from 1/25/2023 for further information.    Since the last visit, the patient reports ***.  For headache prevention she tried Ajovy, but developed significant skin reaction and I advised her not to continue.  She is on 75 mg of topiramate daily.  For acute therapy, she takes Imitrex.  She feels that 25 mg dose is ***.  She also ***.  She denies any significant side effects or interval development of new neurological symptoms.  EXAM:                                                    Physical Exam:   Vitals: There were no vitals taken for this visit.    General: pt is in NAD, cooperative.  Skin: normal turgor, moist mucous membranes, no lesions/rashes noticed.  HEENT: ATNC, white sclera, normal conjunctiva.  Respiratory: Symmetric lung excursion, no accessory respiratory muscle use.  Abdomen: Non distended.  Neurological: awake, cooperative, follows commands, no exam changes compared to the initial visit.    ASSESSMENT AND PLAN:                                                    Assessment: 27 year old female patient presents for follow-up of migraine and tension headaches.  She feels that Ajovy ***.  Unfortunately she was not able to tolerate it due to the skin side effects.  We could consider Qulipta, as I am afraid that similar reaction might happen with other injectable CGRP antagonists, though Aimovig could potentially be tried.  She is also on topiramate and uses Imitrex for acute therapy.  She feels to 25 mg dose is ***.    Diagnoses:    ICD-10-CM    1. Migraine without aura and without  status migrainosus, not intractable  G43.009       2. Tension headache  G44.209         Plan:  There are no Patient Instructions on file for this visit.    Total Time: *** minutes spent on the date of the encounter doing chart review, history and exam, documentation and further activities per the note.    Fernando Ramos MD  [facility]rology  (Chart documentation was completed in part with Dragon voice-recognition software. Even though reviewed, some grammatical, spelling, and word errors may remain.)

## 2023-04-17 ENCOUNTER — OFFICE VISIT (OUTPATIENT)
Dept: NEUROLOGY | Facility: CLINIC | Age: 28
End: 2023-04-17
Payer: COMMERCIAL

## 2023-04-17 VITALS — DIASTOLIC BLOOD PRESSURE: 85 MMHG | SYSTOLIC BLOOD PRESSURE: 134 MMHG | OXYGEN SATURATION: 98 % | HEART RATE: 79 BPM

## 2023-04-17 DIAGNOSIS — G44.209 TENSION HEADACHE: ICD-10-CM

## 2023-04-17 DIAGNOSIS — G43.009 MIGRAINE WITHOUT AURA AND WITHOUT STATUS MIGRAINOSUS, NOT INTRACTABLE: Primary | ICD-10-CM

## 2023-04-17 PROCEDURE — 99214 OFFICE O/P EST MOD 30 MIN: CPT | Performed by: PSYCHIATRY & NEUROLOGY

## 2023-04-17 RX ORDER — ATOGEPANT 10 MG/1
10 TABLET ORAL DAILY
Qty: 30 TABLET | Refills: 11 | Status: SHIPPED | OUTPATIENT
Start: 2023-04-17 | End: 2024-02-01

## 2023-04-17 NOTE — PATIENT INSTRUCTIONS
AFTER VISIT SUMMARY (AVS):    At today's visit we thoroughly discussed current symptoms, available treatment options, and the plan.  I am pleased to hear that your headaches improved.  However, unfortunately your were not able to tolerate your third dose of Ajovy due to significant skin reaction.  We decided to stop it and try Qulipta instead.  Please take 10 mg daily and let me know if you experience any significant side effects.    No other medication changes.    Please keep the headache diary and bring it to the next follow-up visit or upload via My Chart.     Next follow-up appointment is in the next 4 months or earlier if needed.    Please do not hesitate to call me with any questions or concerns.    Thanks.

## 2023-04-17 NOTE — PROGRESS NOTES
ESTABLISHED PATIENT NEUROLOGY NOTE    DATE OF VISIT: 4/17/2023  CLINIC LOCATION: [facility]  MRN: [id number]  PATIENT NAME: Brianna Alarcon  YOB: 1995    REASON FOR VISIT:   Chief Complaint   Patient presents with     Follow Up     Migraine- patient said they have improved      SUBJECTIVE:                                                      HISTORY OF PRESENT ILLNESS: Patient is here to follow up regarding multifactorial headaches. Please refer to my initial note from 1/25/2023 for further information.    Since the last visit, the patient reports noticeable headache improvement.  We reviewed her headache diary together.  In January she had 2 more additional headaches.  One of them was 7/10, and the other 1 was 4/10.  In February she experienced total of 4 headaches, only 2 of them were 5/10 with the rest of them of lesser intensity.  In March she had 3 headaches, and in April - also 3 so far.    For headache prevention, she tried Ajovy, but developed significant skin reaction that lasted 1.5 weeks after third injection, and I advised her not to continue.  She is due for repeat injection this Friday.  She is on 75 mg of topiramate daily.  For acute therapy, she has prescription for 25 mg of Imitrex, but she did not need to use it yet.  She also takes Excedrin that works for milder headaches.  She denies any significant side effects or interval development of new neurological symptoms.  EXAM:                                                    Physical Exam:   Vitals: BP (!) 143/96 (BP Location: Right arm, Patient Position: Sitting, Cuff Size: Adult Regular)   Pulse 102   SpO2 95%     General: pt is in NAD, cooperative.  Skin: normal turgor, moist mucous membranes, no lesions/rashes noticed.  HEENT: ATNC, white sclera, normal conjunctiva.  Respiratory: Symmetric lung excursion, no accessory respiratory muscle use.  Abdomen: Non distended.  Neurological: awake, cooperative, follows  commands, no exam changes compared to the initial visit.    ASSESSMENT AND PLAN:                                                    Assessment: 27 year old female patient presents for follow-up of migraine and tension headaches.  She feels that Ajovy worked well for headache control, but unfortunately she was not able to tolerate it due to the skin side effects.  We could consider Qulipta, as I am afraid that similar reaction might happen with other injectable CGRP antagonists, though Aimovig could potentially be tried.  She is also on topiramate and has Imitrex for acute therapy.  She did not have a chance to try 25 mg prescription yet, but has it on hand.  We decided to stop Ajovy and try Qulipta instead.    Brianna to follow up with Primary Care provider regarding elevated blood pressure.     Diagnoses:    ICD-10-CM    1. Migraine without aura and without status migrainosus, not intractable  G43.009       2. Tension headache  G44.209         Plan: At today's visit we thoroughly discussed current symptoms, available treatment options, and the plan.  I am pleased to hear that her headaches improved.  However, unfortunately she was not able to tolerate third dose of Ajovy due to significant skin reaction.  We decided to stop it and try Qulipta instead.  I recommended the patient to take 10 mg daily and let me know if she experiences any significant side effects.    No other medication changes.    I advised the patient to keep the headache diary and bring it to the next follow-up visit or upload via My Chart.     Next follow-up appointment is in the next 4 months or earlier if needed.    Total Time: 21 minutes spent on the date of the encounter doing chart review, history and exam, documentation and further activities per the note.    Fernando Ramos MD  [facility]  (Chart documentation was completed in part with Dragon voice-recognition software. Even though reviewed, some grammatical,  spelling, and word errors may remain.)

## 2023-04-17 NOTE — PROGRESS NOTES
"Brianna Alarcon is a 27 year old female who presents for:  Chief Complaint   Patient presents with     Follow Up     Migraine- patient said they have improved         Initial Vitals:  There were no vitals taken for this visit. Estimated body mass index is 25.51 kg/m  as calculated from the following:    Height as of 11/10/22: 1.537 m (5' 0.5\").    Weight as of 11/10/22: 60.2 kg (132 lb 12.8 oz).. There is no height or weight on file to calculate BSA. BP completed using cuff size: regular    (2nd set of vitals taken on right arm, while seated,  with adult regular cuff) BP- 134/85, Pulse- 79, O2- 98%    Erin Dong    "

## 2023-04-17 NOTE — LETTER
4/17/2023         RE: Brianna Alarcon  [address] [place] [address]        Dear Colleague,    Thank you for referring your patient, Brianna Alarcon, to the [facility] NEUROLOGY CLINICS [facility]. Please see a copy of my visit note below.    ESTABLISHED PATIENT NEUROLOGY NOTE    DATE OF VISIT: 4/17/2023  CLINIC LOCATION: [facility]  MRN: [id number]  PATIENT NAME: Brianna Alarcon  YOB: 1995    REASON FOR VISIT:   Chief Complaint   Patient presents with     Follow Up     Migraine- patient said they have improved      SUBJECTIVE:                                                      HISTORY OF PRESENT ILLNESS: Patient is here to follow up regarding multifactorial headaches. Please refer to my initial note from 1/25/2023 for further information.    Since the last visit, the patient reports noticeable headache improvement.  We reviewed her headache diary together.  In January she had 2 more additional headaches.  One of them was 7/10, and the other 1 was 4/10.  In February she experienced total of 4 headaches, only 2 of them were 5/10 with the rest of them of lesser intensity.  In March she had 3 headaches, and in April - also 3 so far.    For headache prevention, she tried Ajovy, but developed significant skin reaction that lasted 1.5 weeks after third injection, and I advised her not to continue.  She is due for repeat injection this Friday.  She is on 75 mg of topiramate daily.  For acute therapy, she has prescription for 25 mg of Imitrex, but she did not need to use it yet.  She also takes Excedrin that works for milder headaches.  She denies any significant side effects or interval development of new neurological symptoms.  EXAM:                                                    Physical Exam:   Vitals: BP (!) 143/96 (BP Location: Right arm, Patient Position: Sitting, Cuff Size: Adult Regular)   Pulse 102   SpO2 95%     General: pt is in NAD, cooperative.  Skin:  normal turgor, moist mucous membranes, no lesions/rashes noticed.  HEENT: ATNC, white sclera, normal conjunctiva.  Respiratory: Symmetric lung excursion, no accessory respiratory muscle use.  Abdomen: Non distended.  Neurological: awake, cooperative, follows commands, no exam changes compared to the initial visit.    ASSESSMENT AND PLAN:                                                    Assessment: 27 year old female patient presents for follow-up of migraine and tension headaches.  She feels that Ajovy worked well for headache control, but unfortunately she was not able to tolerate it due to the skin side effects.  We could consider Qulipta, as I am afraid that similar reaction might happen with other injectable CGRP antagonists, though Aimovig could potentially be tried.  She is also on topiramate and has Imitrex for acute therapy.  She did not have a chance to try 25 mg prescription yet, but has it on hand.  We decided to stop Ajovy and try Qulipta instead.    Brianna to follow up with Primary Care provider regarding elevated blood pressure.     Diagnoses:    ICD-10-CM    1. Migraine without aura and without status migrainosus, not intractable  G43.009       2. Tension headache  G44.209         Plan: At today's visit we thoroughly discussed current symptoms, available treatment options, and the plan.  I am pleased to hear that her headaches improved.  However, unfortunately she was not able to tolerate third dose of Ajovy due to significant skin reaction.  We decided to stop it and try Qulipta instead.  I recommended the patient to take 10 mg daily and let me know if she experiences any significant side effects.    No other medication changes.    I advised the patient to keep the headache diary and bring it to the next follow-up visit or upload via My Chart.     Next follow-up appointment is in the next 4 months or earlier if needed.    Total Time: 21 minutes spent on the date of the encounter doing chart review,  "history and exam, documentation and further activities per the note.    Fernando Ramos MD  [facility] Neurology  (Chart documentation was completed in part with Dragon voice-recognition software. Even though reviewed, some grammatical, spelling, and word errors may remain.)      Brianna Alarcon is a 27 year old female who presents for:  Chief Complaint   Patient presents with     Follow Up     Migraine- patient said they have improved         Initial Vitals:  There were no vitals taken for this visit. Estimated body mass index is 25.51 kg/m  as calculated from the following:    Height as of 11/10/22: 1.537 m (5' 0.5\").    Weight as of 11/10/22: 60.2 kg (132 lb 12.8 oz).. There is no height or weight on file to calculate BSA. BP completed using cuff size: regular    (2nd set of vitals taken on right arm, while seated,  with adult regular cuff) BP- 134/85, Pulse- 79, O2- 98%    Erin Dong        Again, thank you for allowing me to participate in the care of your patient.        Sincerely,        Fernando Ramos MD    "

## 2023-07-19 ENCOUNTER — TELEPHONE (OUTPATIENT)
Dept: NEUROLOGY | Facility: CLINIC | Age: 28
End: 2023-07-19
Payer: COMMERCIAL

## 2023-07-19 NOTE — PROGRESS NOTES
ESTABLISHED PATIENT NEUROLOGY NOTE    DATE OF VISIT: 7/20/2023  CLINIC LOCATION: [facility]  MRN: [id number]  PATIENT NAME: Brianna Alarcon  YOB: 1995    REASON FOR VISIT: No chief complaint on file.    SUBJECTIVE:                                                      HISTORY OF PRESENT ILLNESS: Patient is here to follow up regarding multifactorial headaches.  The last visit was on 4/17/2023. Please refer to my initial/other prior notes for further information.    Since the last visit, the patient reports ***.  We reviewed her headache diary together.  She had 3 migraines in April and 2 in July so far.    For headache prevention she is on 75 mg of topiramate daily along with 10 mg of daily Qulipta.  She uses Imitrex and Excedrin for acute therapy.  Denies any significant side effects or interval development of new neurological symptoms.  EXAM:                                                    Physical Exam:   Vitals: There were no vitals taken for this visit.    General: pt is in NAD, cooperative.  Skin: normal turgor, moist mucous membranes, no lesions/rashes noticed.  HEENT: ATNC, white sclera, normal conjunctiva.  Respiratory: Symmetric lung excursion, no accessory respiratory muscle use.  Abdomen: Non distended.  Neurological: awake, cooperative, follows commands, no aphasia or dysarthria noted, cranial nerves are intact, equally moves all extremities, no dysmetria bilaterally, casual gait is normal.  ASSESSMENT AND PLAN:                                                    Assessment: 28 year old female patient presents for follow-up of migraines and tension headaches.  She feels that Qulipta ***.  She also takes topiramate for headache prevention.  She had an allergic reaction on Ajovy, and I suspect that she might have similar reaction on other injectable CGRP antagonists, although Aimovig could be potentially tried.  Excedrin/Imitrex works for acute therapy.    Diagnoses:     ICD-10-CM    1. Migraine without aura and without status migrainosus, not intractable  G43.009       2. Tension headache  G44.209         Plan:  There are no Patient Instructions on file for this visit.    Total Time: *** minutes spent on the date of the encounter doing chart review, history and exam, documentation and further activities per the note.    Fernando Ramos MD  [facility] Neurology  (Chart documentation was completed in part with Dragon voice-recognition software. Even though reviewed, some grammatical, spelling, and word errors may remain.)

## 2023-07-20 ENCOUNTER — OFFICE VISIT (OUTPATIENT)
Dept: NEUROLOGY | Facility: CLINIC | Age: 28
End: 2023-07-20
Payer: COMMERCIAL

## 2023-07-20 VITALS — DIASTOLIC BLOOD PRESSURE: 88 MMHG | HEART RATE: 102 BPM | SYSTOLIC BLOOD PRESSURE: 121 MMHG | OXYGEN SATURATION: 98 %

## 2023-07-20 DIAGNOSIS — G44.209 TENSION HEADACHE: ICD-10-CM

## 2023-07-20 DIAGNOSIS — G43.009 MIGRAINE WITHOUT AURA AND WITHOUT STATUS MIGRAINOSUS, NOT INTRACTABLE: Primary | ICD-10-CM

## 2023-07-20 PROCEDURE — 99213 OFFICE O/P EST LOW 20 MIN: CPT | Performed by: PSYCHIATRY & NEUROLOGY

## 2023-07-20 NOTE — PROGRESS NOTES
"Brianna Alarcon is a 28 year old female who presents for:  Chief Complaint   Patient presents with     Follow Up     Migraines- only 3 migraines since last visit         Initial Vitals:  /88 (BP Location: Left arm, Patient Position: Sitting, Cuff Size: Adult Regular)   Pulse 102   SpO2 98%  Estimated body mass index is 25.51 kg/m  as calculated from the following:    Height as of 11/10/22: 1.537 m (5' 0.5\").    Weight as of 11/10/22: 60.2 kg (132 lb 12.8 oz).. There is no height or weight on file to calculate BSA. BP completed using cuff size: regular    Erin Dong  " Yes

## 2023-07-20 NOTE — LETTER
7/20/2023         RE: Brianna Alarcon  [address] [facility]  [place] [address]        Dear Colleague,    Thank you for referring your patient, Brianna Alarcon, to the [facility] NEUROLOGY CLINICS [facility]. Please see a copy of my visit note below.    ESTABLISHED PATIENT NEUROLOGY NOTE    DATE OF VISIT: 7/20/2023  CLINIC LOCATION: [facility]  MRN: [id number]  PATIENT NAME: Brianna Alarcon  YOB: 1995    REASON FOR VISIT:   Chief Complaint   Patient presents with     Follow Up     Migraines- only 3 migraines since last visit      SUBJECTIVE:                                                      HISTORY OF PRESENT ILLNESS: Patient is here to follow up regarding multifactorial headaches.  The last visit was on 4/17/2023. Please refer to my initial/other prior notes for further information.    Since the last visit, the patient reports notable headache improvement.  We reviewed her headache diary together.  She had 3 migraines in April and 2 in July so far.  She is pleased with the result.    For headache prevention, she is on 75 mg of topiramate daily along with 10 mg of daily Qulipta.  She uses Imitrex and Excedrin for acute therapy.  Denies any significant side effects or interval development of new neurological symptoms.  EXAM:                                                    Physical Exam:   Vitals: /88 (BP Location: Left arm, Patient Position: Sitting, Cuff Size: Adult Regular)   Pulse 102   SpO2 98%     General: pt is in NAD, cooperative.  Skin: normal turgor, moist mucous membranes, no lesions/rashes noticed.  HEENT: ATNC, white sclera, normal conjunctiva.  Respiratory: Symmetric lung excursion, no accessory respiratory muscle use.  Abdomen: Non distended.  Neurological: awake, cooperative, follows commands, no aphasia or dysarthria noted, cranial nerves are intact, equally moves all extremities, no dysmetria bilaterally, casual gait is normal.  ASSESSMENT AND  PLAN:                                                    Assessment: 28 year old female patient presents for follow-up of migraines and tension headaches.  She feels that Qulipta is working well.  She also takes topiramate for headache prevention.  We discussed that eventually we might consider reducing the dose of topiramate or trying to wean her off it.  We decided to continue it at the same dose for now.  She had an allergic reaction on Ajovy, and I suspect that she might have similar reaction on other injectable CGRP antagonists, although Aimovig could be potentially tried in the future if necessary.  Excedrin/Imitrex works for acute therapy.  She has enough refills of Imitrex at this time.    Diagnoses:    ICD-10-CM    1. Migraine without aura and without status migrainosus, not intractable  G43.009       2. Tension headache  G44.209         Plan: At today's visit we thoroughly discussed current symptoms, available treatment options, and the plan.  I am pleased to hear that her headaches are well controlled at this point.    We decided not to make any medication changes.  She will let me know if she needs a refill of Imitrex in the future prior to her next visit.    I advised the patient to keep the headache diary and bring it to the next follow-up visit or upload via My Chart.     Next follow-up appointment is in the next 6 months or earlier if needed.    Total Time: 17 minutes spent on the date of the encounter doing chart review, history and exam, documentation and further activities per the note.    Fernando Ramos MD  [facility] Neurology  (Chart documentation was completed in part with Dragon voice-recognition software. Even though reviewed, some grammatical, spelling, and word errors may remain.)      Brianna Alarcon is a 28 year old female who presents for:  Chief Complaint   Patient presents with     Follow Up     Migraines- only 3 migraines since last visit         Initial Vitals:  BP  "121/88 (BP Location: Left arm, Patient Position: Sitting, Cuff Size: Adult Regular)   Pulse 102   SpO2 98%  Estimated body mass index is 25.51 kg/m  as calculated from the following:    Height as of 11/10/22: 1.537 m (5' 0.5\").    Weight as of 11/10/22: 60.2 kg (132 lb 12.8 oz).. There is no height or weight on file to calculate BSA. BP completed using cuff size: regular    Erin Dong      Again, thank you for allowing me to participate in the care of your patient.        Sincerely,        Fernando Ramos MD    "

## 2023-07-20 NOTE — PROGRESS NOTES
ESTABLISHED PATIENT NEUROLOGY NOTE    DATE OF VISIT: 7/20/2023  CLINIC LOCATION: [facility]  MRN: [id number]  PATIENT NAME: Brianna Alarcon  YOB: 1995    REASON FOR VISIT:   Chief Complaint   Patient presents with     Follow Up     Migraines- only 3 migraines since last visit      SUBJECTIVE:                                                      HISTORY OF PRESENT ILLNESS: Patient is here to follow up regarding multifactorial headaches.  The last visit was on 4/17/2023. Please refer to my initial/other prior notes for further information.    Since the last visit, the patient reports notable headache improvement.  We reviewed her headache diary together.  She had 3 migraines in April and 2 in July so far.  She is pleased with the result.    For headache prevention, she is on 75 mg of topiramate daily along with 10 mg of daily Qulipta.  She uses Imitrex and Excedrin for acute therapy.  Denies any significant side effects or interval development of new neurological symptoms.  EXAM:                                                    Physical Exam:   Vitals: /88 (BP Location: Left arm, Patient Position: Sitting, Cuff Size: Adult Regular)   Pulse 102   SpO2 98%     General: pt is in NAD, cooperative.  Skin: normal turgor, moist mucous membranes, no lesions/rashes noticed.  HEENT: ATNC, white sclera, normal conjunctiva.  Respiratory: Symmetric lung excursion, no accessory respiratory muscle use.  Abdomen: Non distended.  Neurological: awake, cooperative, follows commands, no aphasia or dysarthria noted, cranial nerves are intact, equally moves all extremities, no dysmetria bilaterally, casual gait is normal.  ASSESSMENT AND PLAN:                                                    Assessment: 28 year old female patient presents for follow-up of migraines and tension headaches.  She feels that Qulipta is working well.  She also takes topiramate for headache prevention.  We discussed  that eventually we might consider reducing the dose of topiramate or trying to wean her off it.  We decided to continue it at the same dose for now.  She had an allergic reaction on Ajovy, and I suspect that she might have similar reaction on other injectable CGRP antagonists, although Aimovig could be potentially tried in the future if necessary.  Excedrin/Imitrex works for acute therapy.  She has enough refills of Imitrex at this time.    Diagnoses:    ICD-10-CM    1. Migraine without aura and without status migrainosus, not intractable  G43.009       2. Tension headache  G44.209         Plan: At today's visit we thoroughly discussed current symptoms, available treatment options, and the plan.  I am pleased to hear that her headaches are well controlled at this point.    We decided not to make any medication changes.  She will let me know if she needs a refill of Imitrex in the future prior to her next visit.    I advised the patient to keep the headache diary and bring it to the next follow-up visit or upload via My Chart.     Next follow-up appointment is in the next 6 months or earlier if needed.    Total Time: 17 minutes spent on the date of the encounter doing chart review, history and exam, documentation and further activities per the note.    Fernando Ramos MD  [facility] Neurology  (Chart documentation was completed in part with Dragon voice-recognition software. Even though reviewed, some grammatical, spelling, and word errors may remain.)

## 2023-07-20 NOTE — PATIENT INSTRUCTIONS
AFTER VISIT SUMMARY (AVS):    At today's visit we thoroughly discussed current symptoms, available treatment options, and the plan.  I am pleased to hear that your headaches are well controlled at this point.    We decided not to make any medication changes.  Please let me know if you need a refill of Imitrex in the future prior to your next visit.    Please keep the headache diary and bring it to the next follow-up visit or upload via My Chart.     Next follow-up appointment is in the next 6 months or earlier if needed.    Please do not hesitate to call me with any questions or concerns.    Thanks.

## 2023-10-23 ENCOUNTER — PATIENT OUTREACH (OUTPATIENT)
Dept: FAMILY MEDICINE | Facility: CLINIC | Age: 28
End: 2023-10-23
Payer: COMMERCIAL

## 2023-10-23 NOTE — TELEPHONE ENCOUNTER
PRE PROCEDURE H&P    Patient Name: Franco Pinto  MRN: [id number]  : 1972  Date of Procedure:  2022  Referring Physician: Chalino Ramirez*  Primary Physician: Primary Doctor No  Procedure Physician: Cata Tilley MD       Planned Procedure: Colonoscopy  Diagnosis: screening for colon cancer  Chief Complaint: Same as above    HPI: Patient is an 49 y.o. male is here for the above.     Last colonoscopy: no prior   Family history: negative   Anticoagulation: none     Past Medical History:   History reviewed. No pertinent past medical history.     Past Surgical History:  History reviewed. No pertinent surgical history.     Home Medications:  Prior to Admission medications    Medication Sig Start Date End Date Taking? Authorizing Provider   guaiFENesin (MUCINEX) 600 mg 12 hr tablet Take 1,200 mg by mouth 2 (two) times daily.    Historical Provider   meloxicam (MOBIC) 15 MG tablet Take 1 tablet (15 mg total) by mouth once daily. 22   Alaina Jarrett DPM        Allergies:  Review of patient's allergies indicates:  No Known Allergies     Social History:   Social History     Socioeconomic History    Marital status: Single   Tobacco Use    Smoking status: Never Smoker    Smokeless tobacco: Never Used   Substance and Sexual Activity    Alcohol use: Not Currently     Comment: once a month    Drug use: Never     Social Determinants of Health     Financial Resource Strain: Low Risk     Difficulty of Paying Living Expenses: Not hard at all   Food Insecurity: No Food Insecurity    Worried About Running Out of Food in the Last Year: Never true    Ran Out of Food in the Last Year: Never true   Transportation Needs: No Transportation Needs    Lack of Transportation (Medical): No    Lack of Transportation (Non-Medical): No   Physical Activity: Inactive    Days of Exercise per Week: 0 days    Minutes of Exercise per Session: 0 min   Stress: No Stress Concern Present    Feeling of Stress : Not at all  Patient due for Pap and HPV.    Reminder done today via [employer].    01/24/24 annual--notes added    "  Social Connections: Moderately Integrated    Frequency of Communication with Friends and Family: Twice a week    Frequency of Social Gatherings with Friends and Family: Once a week    Attends Yazidism Services: 1 to 4 times per year    Active Member of Clubs or Organizations: Yes    Attends Club or Organization Meetings: Never    Marital Status: Never    Housing Stability: Unknown    Unable to Pay for Housing in the Last Year: No    Unstable Housing in the Last Year: No       Family History:  Family History   Problem Relation Age of Onset    Colon cancer Paternal Uncle        ROS: No acute cardiac events, no acute respiratory complaints.     Physical Exam (all patients):    BP (!) 142/75 (BP Location: Left arm, Patient Position: Lying)   Pulse 60   Temp 98.6 °F (37 °C) (Temporal)   Resp 18   Ht 6' 2" (1.88 m)   Wt 122.5 kg (270 lb)   SpO2 97%   BMI 34.67 kg/m²   Lungs: Clear to auscultation bilaterally, respirations unlabored  Heart: Regular rate and rhythm, S1 and S2 normal, no obvious murmurs  Abdomen:         Soft, non-tender, bowel sounds normal, no masses, no organomegaly    Lab Results   Component Value Date    WBC 6.59 04/01/2022    MCV 88 04/01/2022    RDW 12.5 04/01/2022     04/01/2022    GLU 93 04/01/2022    HGBA1C 5.4 04/01/2022    BUN 14 04/01/2022     04/01/2022    K 3.9 04/01/2022     04/01/2022        SEDATION PLAN: per anesthesia      History reviewed, vital signs satisfactory, cardiopulmonary status satisfactory, sedation options, risks and plans have been discussed with the patient  All their questions were answered and the patient agrees to the sedation procedures as planned and the patient is deemed an appropriate candidate for the sedation as planned.    Procedure explained to patient, informed consent obtained and placed in chart.    Cata Tilley  7/7/2022  7:23 AM   "

## 2023-12-05 ENCOUNTER — OFFICE VISIT (OUTPATIENT)
Dept: FAMILY MEDICINE | Facility: CLINIC | Age: 28
End: 2023-12-05
Payer: COMMERCIAL

## 2023-12-05 VITALS
RESPIRATION RATE: 16 BRPM | SYSTOLIC BLOOD PRESSURE: 127 MMHG | HEART RATE: 97 BPM | BODY MASS INDEX: 27.4 KG/M2 | DIASTOLIC BLOOD PRESSURE: 91 MMHG | TEMPERATURE: 98.9 F | OXYGEN SATURATION: 99 % | HEIGHT: 61 IN | WEIGHT: 145.13 LBS

## 2023-12-05 DIAGNOSIS — Z30.41 ORAL CONTRACEPTIVE PILL SURVEILLANCE: ICD-10-CM

## 2023-12-05 DIAGNOSIS — Z12.4 CERVICAL CANCER SCREENING: ICD-10-CM

## 2023-12-05 DIAGNOSIS — E66.3 OVERWEIGHT (BMI 25.0-29.9): ICD-10-CM

## 2023-12-05 DIAGNOSIS — Z00.8 ENCOUNTER FOR BIOMETRIC SCREENING: ICD-10-CM

## 2023-12-05 DIAGNOSIS — Z00.00 ANNUAL PHYSICAL EXAM: Primary | ICD-10-CM

## 2023-12-05 DIAGNOSIS — R35.0 URINE FREQUENCY: ICD-10-CM

## 2023-12-05 DIAGNOSIS — R03.0 ELEVATED BLOOD PRESSURE READING WITHOUT DIAGNOSIS OF HYPERTENSION: ICD-10-CM

## 2023-12-05 DIAGNOSIS — R31.29 MICROSCOPIC HEMATURIA: ICD-10-CM

## 2023-12-05 LAB
ALBUMIN UR-MCNC: NEGATIVE MG/DL
APPEARANCE UR: CLEAR
BACTERIA #/AREA URNS HPF: ABNORMAL /HPF
BILIRUB UR QL STRIP: NEGATIVE
COLOR UR AUTO: YELLOW
GLUCOSE UR STRIP-MCNC: NEGATIVE MG/DL
HGB UR QL STRIP: ABNORMAL
KETONES UR STRIP-MCNC: 40 MG/DL
LEUKOCYTE ESTERASE UR QL STRIP: NEGATIVE
NITRATE UR QL: NEGATIVE
PH UR STRIP: 5.5 [PH] (ref 5–8)
RBC #/AREA URNS AUTO: ABNORMAL /HPF
SP GR UR STRIP: 1.02 (ref 1–1.03)
SQUAMOUS #/AREA URNS AUTO: ABNORMAL /LPF
UROBILINOGEN UR STRIP-ACNC: 0.2 E.U./DL
WBC #/AREA URNS AUTO: ABNORMAL /HPF

## 2023-12-05 PROCEDURE — 36415 COLL VENOUS BLD VENIPUNCTURE: CPT | Performed by: FAMILY MEDICINE

## 2023-12-05 PROCEDURE — 99213 OFFICE O/P EST LOW 20 MIN: CPT | Mod: 25 | Performed by: FAMILY MEDICINE

## 2023-12-05 PROCEDURE — G0145 SCR C/V CYTO,THINLAYER,RESCR: HCPCS | Performed by: FAMILY MEDICINE

## 2023-12-05 PROCEDURE — 81001 URINALYSIS AUTO W/SCOPE: CPT | Performed by: FAMILY MEDICINE

## 2023-12-05 PROCEDURE — 99395 PREV VISIT EST AGE 18-39: CPT | Performed by: FAMILY MEDICINE

## 2023-12-05 PROCEDURE — 80048 BASIC METABOLIC PNL TOTAL CA: CPT | Performed by: FAMILY MEDICINE

## 2023-12-05 PROCEDURE — 80061 LIPID PANEL: CPT | Performed by: FAMILY MEDICINE

## 2023-12-05 PROCEDURE — 87624 HPV HI-RISK TYP POOLED RSLT: CPT | Performed by: FAMILY MEDICINE

## 2023-12-05 RX ORDER — NORETHINDRONE ACETATE AND ETHINYL ESTRADIOL, ETHINYL ESTRADIOL AND FERROUS FUMARATE 1MG-10(24)
1 KIT ORAL DAILY
Qty: 90 TABLET | Refills: 3 | Status: SHIPPED | OUTPATIENT
Start: 2023-12-05 | End: 2024-02-01

## 2023-12-05 ASSESSMENT — ENCOUNTER SYMPTOMS
HEMATOCHEZIA: 0
ABDOMINAL PAIN: 0
FEVER: 0
HEADACHES: 1
SHORTNESS OF BREATH: 0
DIZZINESS: 0
WEAKNESS: 0
HEMATURIA: 0
CHILLS: 0
JOINT SWELLING: 0
SORE THROAT: 0
PALPITATIONS: 0
NAUSEA: 0
DIARRHEA: 1
FREQUENCY: 1
PARESTHESIAS: 0
COUGH: 0
NERVOUS/ANXIOUS: 0
ARTHRALGIAS: 0
MYALGIAS: 0
CONSTIPATION: 0
DYSURIA: 0
EYE PAIN: 0
HEARTBURN: 0

## 2023-12-05 ASSESSMENT — PATIENT HEALTH QUESTIONNAIRE - PHQ9
10. IF YOU CHECKED OFF ANY PROBLEMS, HOW DIFFICULT HAVE THESE PROBLEMS MADE IT FOR YOU TO DO YOUR WORK, TAKE CARE OF THINGS AT HOME, OR GET ALONG WITH OTHER PEOPLE: SOMEWHAT DIFFICULT
SUM OF ALL RESPONSES TO PHQ QUESTIONS 1-9: 3
SUM OF ALL RESPONSES TO PHQ QUESTIONS 1-9: 3

## 2023-12-05 ASSESSMENT — ASTHMA QUESTIONNAIRES: ACT_TOTALSCORE: 25

## 2023-12-05 NOTE — COMMUNITY RESOURCES LIST (ENGLISH)
12/05/2023   [facility]  N/A  For questions about this resource list or additional care needs, please contact your primary care clinic or care manager.  Phone: [phone]   Email: N/A   Address: [facility] [place] [address]   Hours: N/A        Hotlines and Helplines       Hotline - Housing crisis  1  [facility] Housing Resource Line Distance: 8.42 miles      Phone/Virtual   [address] [place] [address]  Language: English  Hours: Mon - Sun Open 24 Hours   Phone: (813) 730-8329 Website: https://www.[facility]./2689/Basic-Needs     2  [facility] Distance: 11.55 miles      Phone/Virtual   [address] [place] [address]  Language: English  Hours: Mon - Sun Open 24 Hours   Phone: (937) 985-1041 Email: communications@[facility].org Website: https://[facility].org/oursaviourshousing/          Housing       Coordinated Entry access point  3  [facility]  Office - [facility] Distance: 0.52 miles      Phone/Virtual   [address] [place] [address]  Language: English  Hours: Mon - Fri 8:30 AM - 12:00 PM , Mon - Fri 1:00 PM - 4:00 PM  Fees: Free   Phone: (836) 188-8471 Ext.2 Email: jazzmine@[facility] [employer].org Website: https://www.[facility] Center[facility]rb[facility] - Beetux Softwareusa.org/usn/     4  [facility] ([facility] - Housing Services Distance: 11.68 miles      In-Person   [address] [place] [address]  Language: English  Hours: Mon - Fri 9:00 AM - 5:00 PM  Fees: Free   Phone: (496) 217-5202 Email: housing@[facility].org Website: http://www.[facility].org/housing     Drop-in center or day shelter  5  [facility] Distance: 10.09 miles      In-Person   [address] [place] [address]  Language: English, Hmong, Uzbek, Ugandan  Hours: Mon - Thu 8:30 AM - 4:30 PM , Sat - Sun 9:00 AM - 12:00 PM  Fees: Free   Phone: (412) 164-6033 Email: info@[employer].org Website: https://[employer].org/     6   [facility] Distance: 11.12 miles      In-Person   [address] [place] [address]  Language: English, Vincentian, Stateless  Hours: Mon - Sat 7:00 AM - 3:00 PM  Fees: Free, Self Pay   Phone: (535) 334-3325 Email: info@[employer] Website: https://www.[employer]/locations/opportunity-center/     Housing search assistance  7  [facility] [employer] ([facility]) - [facility] Rental Housing Distance: 0.53 miles      In-Person, Phone/Virtual   [address] [address]  Language: English  Hours: Mon - Fri 8:00 AM - 4:30 PM  Fees: Free   Phone: (360) 136-9421 Email: accap@[employer].org Website: http://www.accap.org     8  [facility] [employer] ([employer] Distance: 5 miles      Phone/Virtual   [address] [place] [address]  Language: English, Stateless  Hours: Mon - Fri 9:00 AM - 5:00 PM  Fees: Free   Phone: (680) 115-3259 Email: services@[employer] Website: https://www.[employer]     Shelter for families  9  [facility] Distance: 12.53 miles      In-Person   [address] [place] [address]  Language: English  Hours: Mon - Fri 3:00 PM - 9:00 AM , Sat - Sun Open 24 Hours  Fees: Free   Phone: (297) 667-9544 Ext.1 Website: https://www.[facility].org/2020/07/03/emergency-family-shelter/     Shelter for individuals  10  [facility] Distance: 10.41 miles      In-Person   [address] [place] [address]  Language: English  Hours: Mon - Fri 4:00 PM - 9:00 AM  Fees: Free   Phone: (396) 709-1003 Email: rowena@[facility].org Website: https://[facility].org/[facility]/     11  [facility] Distance: 11.55 miles      In-Person   [address] [place] [address]  Language: English  Hours: Mon - Sun Open 24 Hours  Fees: Free   Phone: (984) 332-8564 Email:  communications@[facility].org Website: https://[facility].org/oursaviourshousing/          Important Numbers & Websites       Emergency Services   911  [facility] Services   311  Poison Control   (942) 309-8843  Suicide Prevention Lifeline   (464) 583-8130 (TALK)  Child Abuse Hotline   (617) 985-1810 (4-A-Child)  Sexual Assault Hotline   (681) 825-3981 (HOPE)  National Runaway Safeline   (418) 386-8535 (RUNAWAY)  All-Options Talkline   (953) 405-7823  Substance Abuse Referral   (885) 199-6150 (HELP)

## 2023-12-05 NOTE — COMMUNITY RESOURCES LIST (ENGLISH)
12/05/2023   [facility]  N/A  For questions about this resource list or additional care needs, please contact your primary care clinic or care manager.  Phone: [phone]   Email: N/A   Address: [facility] [place] [address]   Hours: N/A        Hotlines and Helplines       Hotline - Housing crisis  1  [facility] Housing Resource Line Distance: 8.42 miles      Phone/Virtual   [address] [place] [address]  Language: English  Hours: Mon - Sun Open 24 Hours   Phone: (368) 462-4543 Website: https://www.[facility]./2689/Basic-Needs     2  [facility] Distance: 11.55 miles      Phone/Virtual   [address] [place] [address]  Language: English  Hours: Mon - Sun Open 24 Hours   Phone: (968) 115-3069 Email: communications@[facility].org Website: https://[facility].org/oursaviourshousing/          Housing       Coordinated Entry access point  3  [facility]  Office - [facility] Distance: 0.52 miles      Phone/Virtual   [address] [place] [address]  Language: English  Hours: Mon - Fri 8:30 AM - 12:00 PM , Mon - Fri 1:00 PM - 4:00 PM  Fees: Free   Phone: (544) 861-6472 Ext.2 Email: jazzmine@[facility].org Website: https://www.[employer] DepartmentClixtrusa.org/usn/     4  [facility] ([facility] - Housing Services Distance: 11.68 miles      In-Person   [address] [place] [address]  Language: English  Hours: Mon - Fri 9:00 AM - 5:00 PM  Fees: Free   Phone: (576) 943-6531 Email: housing@[facility].org Website: http://www.[facility].org/housing     Drop-in center or day shelter  5  [facility] Distance: 10.09 miles      In-Person   [address] [place] [address]  Language: English, Hmong, Uzbek, Greek  Hours: Mon - Thu 8:30 AM - 4:30 PM , Sat - Sun 9:00 AM - 12:00 PM  Fees: Free   Phone: (369) 481-9129 Email: info@[employer].org Website: https://[employer].org/     6   [facility] Distance: 11.12 miles      In-Person   [address] [place] [address]  Language: English, Palauan, [place] Islander  Hours: Mon - Sat 7:00 AM - 3:00 PM  Fees: Free, Self Pay   Phone: (969) 164-8681 Email: info@[employer] Website: https://www.[employer]/locations/opportunity-center/     Housing search assistance  7  [facility] Community [facility] [employer] ([facility]) - [facility] Rental Housing Distance: 0.53 miles      In-Person, Phone/Virtual   [address] [address]  Language: English  Hours: Mon - Fri 8:00 AM - 4:30 PM  Fees: Free   Phone: (128) 800-4558 Email: accap@accap.org Website: http://www.accap.org     8  [facility]ighborhood [facility] [employer] [place] ([employer] Distance: 5 miles      Phone/Virtual   [address] [address] [place] [address]  Language: English, [place] Islander  Hours: Mon - Fri 9:00 AM - 5:00 PM  Fees: Free   Phone: (251) 722-5868 Email: services@[employer] Website: https://www.[employer] Guides     Shelter for families  9  [facility] Distance: 12.53 miles      In-Person   [address] [place] [address]  Language: English  Hours: Mon - Fri 3:00 PM - 9:00 AM , Sat - Sun Open 24 Hours  Fees: Free   Phone: (352) 500-7400 Ext.1 Website: https://www.[facility].org/2020/07/03/emergency-family-shelter/     Shelter for individuals  10  [facility] Distance: 10.41 miles      In-Person   [address] [place] [address]  Language: English  Hours: Mon - Fri 4:00 PM - 9:00 AM  Fees: Free   Phone: (135) 684-7929 Email: rowena@[facility].org Website: https://[facility].org/[facility]/     11  [facility] Distance: 11.55 miles      In-Person   [address] [place] [address]  Language: English  Hours: Mon - Sun Open 24 Hours  Fees: Free   Phone: (399) 584-8347 Email:  communications@[facility].org Website: https://[facility]-[place].org/oursaviourshousing/          Important Numbers & Websites       Emergency Services   911  [facility] Services   311  Poison Control   (570) 214-8554  Suicide Prevention Lifeline   (929) 381-3029 (TALK)  Child Abuse Hotline   (861) 300-7524 (4-A-Child)  Sexual Assault Hotline   (729) 332-4543 (HOPE)  National Runaway Safeline   (970) 247-1860 (RUNAWAY)  All-Options Talkline   (982) 355-3894  Substance Abuse Referral   (713) 713-3090 (HELP)

## 2023-12-05 NOTE — PROGRESS NOTES
SUBJECTIVE:   Brianna is a 28 year old, presenting for the following:  Physical and Weight Problem    Chief Complaints and History of Present Illnesses   Patient presents with    Physical    Weight Problem            12/5/2023     4:43 PM   Additional Questions   Roomed by Angelina DUEÑAS CMA   Accompanied by Self       Healthy Habits:     Getting at least 3 servings of Calcium per day:  Yes    Bi-annual eye exam:  Yes    Dental care twice a year:  NO    Sleep apnea or symptoms of sleep apnea:  None    Diet:  Other    Frequency of exercise:  2-3 days/week    Duration of exercise:  15-30 minutes    Taking medications regularly:  Yes    Medication side effects:  Not applicable    Additional concerns today:  No      Today's PHQ-9 Score:       12/5/2023     4:33 PM   PHQ-9 SCORE   PHQ-9 Total Score MyChart 3 (Minimal depression)   PHQ-9 Total Score 3         11/8/2021     3:30 PM 5/12/2022    11:10 AM 12/5/2023     4:33 PM   PHQ   PHQ-9 Total Score 9 4 3   Q9: Thoughts of better off dead/self-harm past 2 weeks Not at all Not at all Not at all         2/14/2020     2:00 PM 11/8/2021     3:30 PM 5/12/2022    11:10 AM   ALY-7 SCORE   Total Score   5 (mild anxiety)   Total Score 20 6 5       PSYCH: Seeing psychiatry specialist.        NEURO: Goes to [facility] in [place].       BLOOD PRESSURE: does not have a home BP cuff.   BP Readings from Last 6 Encounters:   12/05/23 (!) 127/91   07/20/23 121/88   04/17/23 134/85   01/25/23 121/87   11/10/22 122/85   05/12/22 117/87       OCP: taking continuously, having some breakthrough bleeding, has taken for 15 years.        WEIGHT: trying to cut back on carbs and sugars, feels more bloated.   Previously on 100mg for Topamax, had to lower dose due to side effects.   Currently on 450mg of wellbutrin.   Works in a large warehouse, walks at lunch.   Monitoring her intake.   Wt Readings from Last 4 Encounters:   12/05/23 65.8 kg (145 lb 2 oz)   11/10/22 60.2 kg (132 lb 12.8 oz)   05/12/22  59.2 kg (130 lb 9.6 oz)   22 58.5 kg (129 lb)   Body mass index is 27.88 kg/m .      HEALTH MAINTENANCE:   - covid: declines    - flu: declines    - pap: will do today     Lab Results   Component Value Date    CHOL 195 11/10/2022     Lab Results   Component Value Date    HDL 58 11/10/2022     Lab Results   Component Value Date     11/10/2022     Lab Results   Component Value Date    TRIG 76 11/10/2022     The ASCVD Risk score (Braulio CABRERA, et al., 2019) failed to calculate for the following reasons:    The 2019 ASCVD risk score is only valid for ages 40 to 79     Last Comprehensive Metabolic Panel:  Lab Results   Component Value Date     11/10/2022    POTASSIUM 4.6 11/10/2022    CHLORIDE 107 11/10/2022    CO2 21 (L) 11/10/2022    ANIONGAP 9 11/10/2022    GLC 84 11/10/2022    BUN 11.1 11/10/2022    CR 0.83 11/10/2022    GFRESTIMATED >90 11/10/2022    ALEXANDRA 8.9 11/10/2022     TSH   Date Value Ref Range Status   11/10/2022 1.32 0.30 - 4.20 uIU/mL Final   2021 2.91 0.30 - 5.00 uIU/mL Final         Social History     Tobacco Use    Smoking status: Never     Passive exposure: Never    Smokeless tobacco: Never   Substance Use Topics    Alcohol use: Yes     Alcohol/week: 0.0 - 3.0 standard drinks of alcohol             2023     4:35 PM   Alcohol Use   Prescreen: >3 drinks/day or >7 drinks/week? No     Reviewed orders with patient.  Reviewed health maintenance and updated orders accordingly - Yes      Breast Cancer Screenin/8/2021    12:28 PM   Breast CA Risk Assessment (FHS-7)   Do you have a family history of breast, colon, or ovarian cancer? No / Unknown         Patient under 40 years of age: Routine Mammogram Screening not recommended.   Pertinent mammograms are reviewed under the imaging tab.    History of abnormal Pap smear: NO - age 21-29 PAP every 3 years recommended  YES - due for pap today (annually)      Latest Ref Rng & Units 11/10/2022     2:41 PM 2021     1:43 PM   PAP  "/ HPV   PAP  Atypical squamous cells of undetermined significance (ASC-US)  Low-grade squamous intraepithelial lesion (LSIL) encompassing HPV/mild dysplasia/CIN1    HPV 16 DNA Negative Negative     HPV 18 DNA Negative Negative     Other HR HPV Negative Negative       Reviewed and updated as needed this visit by clinical staff   Tobacco  Allergies  Meds              Reviewed and updated as needed this visit by Provider   Tobacco  Allergies  Meds  Problems  Med Hx  Surg Hx  Fam Hx             Review of Systems   Constitutional:  Negative for chills and fever.   HENT:  Negative for congestion, ear pain, hearing loss and sore throat.    Eyes:  Negative for pain and visual disturbance.   Respiratory:  Negative for cough and shortness of breath.    Cardiovascular:  Negative for chest pain, palpitations and peripheral edema.   Gastrointestinal:  Positive for diarrhea. Negative for abdominal pain, constipation, heartburn, hematochezia and nausea.   Genitourinary:  Positive for frequency. Negative for dysuria, genital sores, hematuria and urgency.   Musculoskeletal:  Negative for arthralgias, joint swelling and myalgias.   Skin:  Negative for rash.   Neurological:  Positive for headaches. Negative for dizziness, weakness and paresthesias.   Psychiatric/Behavioral:  Negative for mood changes. The patient is not nervous/anxious.      CONSTIPATION: no issues with diarrhea, miralax as needed.     FREQUENCY: feels frequency, no dysuria or hematuria. No incontinence.      HEADACHES: see above.       OBJECTIVE:   BP (!) 127/91 (BP Location: Left arm, Patient Position: Sitting, Cuff Size: Adult Regular)   Pulse 97   Temp 98.9  F (37.2  C) (Oral)   Resp 16   Ht 1.537 m (5' 0.5\")   Wt 65.8 kg (145 lb 2 oz)   LMP  (LMP Unknown)   SpO2 99%   BMI 27.88 kg/m    Physical Exam  GENERAL: healthy, alert and no distress  EYES: Eyes grossly normal to inspection, PERRL and conjunctivae and sclerae normal  HENT: ear canals and " TM's normal, nose and mouth without ulcers or lesions  NECK: no adenopathy, no asymmetry, masses, or scars and thyroid normal to palpation  RESP: lungs clear to auscultation - no rales, rhonchi or wheezes  CV: regular rate and rhythm, normal S1 S2, no S3 or S4, no murmur, click or rub, no peripheral edema and peripheral pulses strong  ABDOMEN: soft, nontender, no hepatosplenomegaly, no masses and bowel sounds normal   (female): normal female external genitalia, normal urethral meatus, vaginal mucosa pink, moist, well rugated, and normal cervix/adnexa/uterus without masses or discharge  MS: no gross musculoskeletal defects noted, no edema  SKIN: no suspicious lesions or rashes  NEURO: Normal strength and tone, mentation intact and speech normal  PSYCH: mentation appears normal, affect normal/bright        ASSESSMENT/PLAN:     1. Annual physical exam  Reviewed health history and health maintenance recommendations.     2. Elevated blood pressure reading without diagnosis of hypertension  - Home Blood Pressure Monitor Order for DME - ONLY FOR DME    Recommend we check home BP readings for more data. Provided home BP cuff prescription. Check a few times a week, document readings, bring home BP readings and home cuff to clinic for nurse visit.     3. Oral contraceptive pill surveillance  - Norethin-Eth Estrad-Fe Biphas (LO LOESTRIN FE) 1 MG-10 MCG / 10 MCG TABS; Take 1 tablet by mouth daily  Dispense: 90 tablet; Refill: 3    Doing well on OCPs, desires to continue. Having some intermittent breakthrough bleeding, consider a week break every 3 months.     4. Urine frequency  - UA Macroscopic with reflex to Microscopic and Culture - Lab Collect; Future    Evaluate for infection and glucosuria.     5. Cervical cancer screening  - Pap Screen reflex to HPV if ASCUS - recommend age 25 - 29     Due for surveillance. Last pap ASCUS, HPV negative, due for surveillance.     Patient has been advised of split billing requirements  "and indicates understanding: Yes      COUNSELING:  Reviewed preventive health counseling, as reflected in patient instructions      BMI:   Estimated body mass index is 27.88 kg/m  as calculated from the following:    Height as of this encounter: 1.537 m (5' 0.5\").    Weight as of this encounter: 65.8 kg (145 lb 2 oz).   Weight management plan: Discussed healthy diet and exercise guidelines      She reports that she has never smoked. She has never been exposed to tobacco smoke. She has never used smokeless tobacco.          Adriane Zamorano, [facility]  "

## 2023-12-06 ENCOUNTER — TELEPHONE (OUTPATIENT)
Dept: FAMILY MEDICINE | Facility: CLINIC | Age: 28
End: 2023-12-06
Payer: COMMERCIAL

## 2023-12-06 LAB
ANION GAP SERPL CALCULATED.3IONS-SCNC: 10 MMOL/L (ref 7–15)
BUN SERPL-MCNC: 12.9 MG/DL (ref 6–20)
CALCIUM SERPL-MCNC: 9.1 MG/DL (ref 8.6–10)
CHLORIDE SERPL-SCNC: 105 MMOL/L (ref 98–107)
CHOLEST SERPL-MCNC: 184 MG/DL
CREAT SERPL-MCNC: 0.69 MG/DL (ref 0.51–0.95)
DEPRECATED HCO3 PLAS-SCNC: 22 MMOL/L (ref 22–29)
EGFRCR SERPLBLD CKD-EPI 2021: >90 ML/MIN/1.73M2
FASTING STATUS PATIENT QL REPORTED: NO
GLUCOSE SERPL-MCNC: 80 MG/DL (ref 70–99)
HDLC SERPL-MCNC: 53 MG/DL
LDLC SERPL CALC-MCNC: 112 MG/DL
NONHDLC SERPL-MCNC: 131 MG/DL
POTASSIUM SERPL-SCNC: 4.1 MMOL/L (ref 3.4–5.3)
SODIUM SERPL-SCNC: 137 MMOL/L (ref 135–145)
TRIGL SERPL-MCNC: 95 MG/DL

## 2023-12-06 NOTE — TELEPHONE ENCOUNTER
Called pt and lmtcb.     Dr. Zamorano completed the form pt brought to her appt. There is another spot that the pt needs to sign. Does she want to stop by the clinic and  the form or does she want us to mail it to her?

## 2023-12-06 NOTE — PROGRESS NOTES
Microscopic hematuria  - UA reflex to Microscopic - lab collect; Future     Recheck urine in 6 weeks.    Adriane Zamorano, DO

## 2023-12-06 NOTE — RESULT ENCOUNTER NOTE
Patient updated by [facility]Star message with lab results.       Hello Brianna,   1.  Cholesterol slightly improved from last check.  Continue working on a heart healthy diet, regular physical activity, weight management to help manage your cholesterol.  2.  Kidney function, electrolytes, blood sugar normal.  3.  Urine does not appear infected.  There are red blood cells in your urine.  I did recommend rechecking your urine in 6 weeks to see if persistent red blood cells present.  You should not recheck urine if actively menstruating as that will have false positive red blood cells.  If persistent blood cells in your urine, we will discuss additional work-up.  Reach out with follow-up questions or concerns.  Adriane Zamorano, DO

## 2023-12-07 LAB
BKR LAB AP GYN ADEQUACY: NORMAL
BKR LAB AP GYN INTERPRETATION: NORMAL
BKR LAB AP HPV REFLEX: NORMAL
BKR LAB AP PREVIOUS ABNL DX: NORMAL
BKR LAB AP PREVIOUS ABNORMAL: NORMAL
PATH REPORT.COMMENTS IMP SPEC: NORMAL
PATH REPORT.COMMENTS IMP SPEC: NORMAL
PATH REPORT.RELEVANT HX SPEC: NORMAL

## 2023-12-18 ENCOUNTER — TELEPHONE (OUTPATIENT)
Dept: NEUROLOGY | Facility: CLINIC | Age: 28
End: 2023-12-18
Payer: COMMERCIAL

## 2023-12-18 NOTE — TELEPHONE ENCOUNTER
Left patient a message to call reschedule follow up visit for 1-24-24 as provider will not be in clinic this week.  Patient was given phone number for central scheduling at [phone]

## 2023-12-19 NOTE — TELEPHONE ENCOUNTER
2nd attempt to reschedule appt from January 24 as provider will not be in clinic. Patient was given the phone number for central scheduling at [phone] for the purpose of rescheduling.

## 2024-01-08 ENCOUNTER — TELEPHONE (OUTPATIENT)
Dept: NEUROLOGY | Facility: CLINIC | Age: 29
End: 2024-01-08
Payer: COMMERCIAL

## 2024-01-08 NOTE — TELEPHONE ENCOUNTER
4th message left. Advised appointment is cancelled due to provider out of clinic and to call back to reschedule. Provided scheduling phone number to call.

## 2024-01-11 ENCOUNTER — MYC MEDICAL ADVICE (OUTPATIENT)
Dept: FAMILY MEDICINE | Facility: CLINIC | Age: 29
End: 2024-01-11
Payer: COMMERCIAL

## 2024-01-11 DIAGNOSIS — N97.9 FEMALE INFERTILITY: Primary | ICD-10-CM

## 2024-01-23 ENCOUNTER — MYC MEDICAL ADVICE (OUTPATIENT)
Dept: FAMILY MEDICINE | Facility: CLINIC | Age: 29
End: 2024-01-23

## 2024-01-23 ENCOUNTER — LAB (OUTPATIENT)
Dept: LAB | Facility: CLINIC | Age: 29
End: 2024-01-23
Payer: COMMERCIAL

## 2024-01-23 DIAGNOSIS — Z32.01 POSITIVE URINE PREGNANCY TEST: ICD-10-CM

## 2024-01-23 DIAGNOSIS — Z32.01 POSITIVE URINE PREGNANCY TEST: Primary | ICD-10-CM

## 2024-01-23 LAB — HCG SERPL QL: POSITIVE

## 2024-01-23 PROCEDURE — 36415 COLL VENOUS BLD VENIPUNCTURE: CPT

## 2024-01-23 PROCEDURE — 84703 CHORIONIC GONADOTROPIN ASSAY: CPT

## 2024-01-24 ENCOUNTER — ALLIED HEALTH/NURSE VISIT (OUTPATIENT)
Dept: FAMILY MEDICINE | Facility: CLINIC | Age: 29
End: 2024-01-24
Payer: COMMERCIAL

## 2024-01-24 VITALS — HEART RATE: 98 BPM | SYSTOLIC BLOOD PRESSURE: 120 MMHG | DIASTOLIC BLOOD PRESSURE: 78 MMHG

## 2024-01-24 DIAGNOSIS — Z01.30 BP CHECK: Primary | ICD-10-CM

## 2024-01-24 PROCEDURE — 99207 PR NO CHARGE NURSE ONLY: CPT

## 2024-01-24 NOTE — PROGRESS NOTES
Follow Up Blood Pressure Check    Brianna Alarcon is a 28 year old female recommended to follow up for blood pressure check by Radha Justin. Anihypertensive medications and adherence were verified: Yes.     Reason for visit: Elevated blood pressure     Medication change at last visit: has stopped some medications due to finding out she is pregnant.     Today's Vitals:   Vitals:    01/24/24 1459   BP: 120/78   BP Location: Right arm   Patient Position: Sitting   Cuff Size: Adult Regular   Pulse: 98           Lowest blood pressure today is  120/78  and they deny signs or symptoms of new onset: severe headache, fatigue, confusion, vision changes, chest pain, pounding in the chest, neck, ears, irregular heartbeat, difficulty breathing, and blood in the urine.  Please inform patient of his/her blood pressure today.  If they are asymptomatic, the patient is to continue current medications.  This message will be routed to their provider, and they will be notified if a change in medication is recommended.        Lorin Moon MA    Current Outpatient Medications   Medication Sig Dispense Refill    acetaminophen-caffeine (EXCEDRIN) 500-65 mg Tab per tablet Take 2 tablets by mouth every 6 hours as needed      amphetamine-dextroamphetamine (ADDERALL) 10 MG tablet Take 10 mg by mouth in the morning and 10 mg in the evening.      Atogepant (QULIPTA) 10 MG TABS Take 10 mg by mouth daily 30 tablet 11    b complex vitamins capsule [B COMPLEX VITAMINS CAPSULE] Take 1 capsule by mouth daily.      buPROPion (WELLBUTRIN XL) 150 MG 24 hr tablet [BUPROPION (WELLBUTRIN XL) 150 MG 24 HR TABLET] Take 1 tablet (150 mg total) by mouth every morning. 90 tablet 0    buPROPion (WELLBUTRIN XL) 300 MG 24 hr tablet [BUPROPION (WELLBUTRIN XL) 300 MG 24 HR TABLET] TAKE 1 TABLET BY MOUTH EVERY DAY IN THE MORNING 90 tablet 3    DULoxetine (CYMBALTA) 30 MG capsule Take 30 mg by mouth 2 times daily Take 1 cap by mouth 2 times daily      FERROUS  SULFATE ORAL [FERROUS SULFATE ORAL] Take 1 tablet by mouth daily.      hydrOXYzine (ATARAX) 25 MG tablet 25-50 mg as needed      Norethin-Eth Estrad-Fe Biphas (LO LOESTRIN FE) 1 MG-10 MCG / 10 MCG TABS Take 1 tablet by mouth daily 90 tablet 3    ondansetron (ZOFRAN) 8 MG tablet [ONDANSETRON (ZOFRAN) 8 MG TABLET] take 1 tablet by oral route  twice daily (Patient not taking: Reported on 12/5/2023) 180 tablet 0    polyethylene glycol (MIRALAX) 17 gram/dose powder [POLYETHYLENE GLYCOL (MIRALAX) 17 GRAM/DOSE POWDER] Take 17 g by mouth daily as needed. 1,530 g 1    SUMAtriptan (IMITREX) 25 MG tablet Take 1 tablet (25 mg) by mouth at onset of headache for migraine May repeat in 2 hours. Max 4 tablets/24 hours. (Patient not taking: Reported on 12/5/2023) 9 tablet 3    topiramate (TOPAMAX) 25 MG tablet TAKE 3 TABLETS BY MOUTH EVERY  tablet 1    traZODone (DESYREL) 50 MG tablet [TRAZODONE (DESYREL) 50 MG TABLET] Take  mg by mouth at bedtime as needed for sleep.      ubidecarenone/vitamin E mixed (COENZYME Q10-VITAMIN E) 100 mg- 10 unit cap [UBIDECARENONE/VITAMIN E MIXED (COENZYME Q10-VITAMIN E) 100 MG- 10 UNIT CAP] take 1 capsule daily

## 2024-01-24 NOTE — PROGRESS NOTES
Please update patient.  Thanks for coming back to recheck blood pressure.  Readings are improved.  With report of positive pregnancy test, has she established with someone for prenatal care?  It would be important to have an early visit for med review.   Adriane Zamorano, DO

## 2024-01-26 NOTE — TELEPHONE ENCOUNTER
She has a history of migraines/psych on multiple meds seeing you on 3/7/24 for first OB. Wondering if you can see her sooner?

## 2024-01-26 NOTE — TELEPHONE ENCOUNTER
Attempted to call patient, left message for return call to clinic. Please relay notation from Dr. Pringle below and assist with scheduling when patient returns call.    Isaura Becerra RN

## 2024-01-26 NOTE — TELEPHONE ENCOUNTER
I'd see her sooner for a discussion of nausea and medications in pregnancy next week if I have anything?  Let me know if I don't.  I would do this as a 20-min appt.  In the meantime, she needs to urgently talk with her psychiatrist and neurologist as discussed previously.  She should stop her topamax (safely, taper?), avoid Imitrex, and ideally taper off Cymbalta during pregnancy if able.  Will need to discuss risks versus benefits of stimulant, trazodone, and Qulipta during pregnancy.

## 2024-02-01 ENCOUNTER — OFFICE VISIT (OUTPATIENT)
Dept: FAMILY MEDICINE | Facility: CLINIC | Age: 29
End: 2024-02-01
Payer: COMMERCIAL

## 2024-02-01 VITALS
HEIGHT: 61 IN | TEMPERATURE: 97.9 F | RESPIRATION RATE: 16 BRPM | SYSTOLIC BLOOD PRESSURE: 116 MMHG | WEIGHT: 153.6 LBS | DIASTOLIC BLOOD PRESSURE: 76 MMHG | OXYGEN SATURATION: 100 % | HEART RATE: 72 BPM | BODY MASS INDEX: 29 KG/M2

## 2024-02-01 DIAGNOSIS — G43.009 MIGRAINE WITHOUT AURA AND WITHOUT STATUS MIGRAINOSUS, NOT INTRACTABLE: ICD-10-CM

## 2024-02-01 DIAGNOSIS — O21.9 VOMITING OR NAUSEA OF PREGNANCY: ICD-10-CM

## 2024-02-01 DIAGNOSIS — F33.9 EPISODE OF RECURRENT MAJOR DEPRESSIVE DISORDER, UNSPECIFIED DEPRESSION EPISODE SEVERITY (H): ICD-10-CM

## 2024-02-01 DIAGNOSIS — Z34.90 PREGNANCY WITH GESTATION OF UNKNOWN DURATION: Primary | ICD-10-CM

## 2024-02-01 PROCEDURE — 99213 OFFICE O/P EST LOW 20 MIN: CPT | Performed by: FAMILY MEDICINE

## 2024-02-01 RX ORDER — ONDANSETRON 4 MG/1
4 TABLET, FILM COATED ORAL EVERY 8 HOURS PRN
Qty: 30 TABLET | Refills: 1 | Status: SHIPPED | OUTPATIENT
Start: 2024-02-01 | End: 2024-04-04

## 2024-02-01 ASSESSMENT — ENCOUNTER SYMPTOMS: NAUSEA: 1

## 2024-02-01 NOTE — PATIENT INSTRUCTIONS
Discuss your Cymbalta with your psychiatrist.  Other medications are preferred if able.  It is also recommended to be on the lowest effective dose of the bupropion during pregnancy.

## 2024-02-01 NOTE — PROGRESS NOTES
Assessment & Plan     Pregnancy with gestation of unknown duration  Patient is completely unsure of her LMP as she was taking an OCP continuously.  She denies any missed doses.  She started with nausea about 2 weeks ago, so clinically she may be 8-9 weeks gestation.  Recommended an early ultrasound to confirm dates and viability and patient is agreeable.  She is taking a prenatal vitamin, and she will think about her options for following this pregnancy, discussed FMOB, midwifery and Ob/Gyn care today.  - US OB < 14 Weeks Single; Future    Vomiting or nausea of pregnancy  Discussed B6 and Unisom as first-line treatment.  Patient would also like to have Zofran on hand as this has worked for her in the past.  - ondansetron (ZOFRAN) 4 MG tablet; Take 1 tablet (4 mg) by mouth every 8 hours as needed for nausea    Episode of recurrent major depressive disorder, unspecified depression episode severity (H24)  Discussed at length today safety evidence for or against her medications, including stimulant.  She stopped all her medications and has an appointment upcoming with her Psychiatrist.  She will likely resume bupropion while awaiting this appointment, recommended discussion of replacement for Cymbalta if possible due to limited safety evidence.    Migraine without aura and without status migrainosus, not intractable  Recommended stopping Topamax and avoiding Imitrex.  Can use Tylenol in combination with Reglan for acute migraine.  Patient also stopped Qulipta.                  Cain Reed is a 28 year old, presenting for the following health issues:  Nausea        2/1/2024     7:36 AM   Additional Questions   Roomed by Annika ROGERS LPN     Nausea  Associated symptoms include nausea.   History of Present Illness       Reason for visit:  Prenatal    She eats 4 or more servings of fruits and vegetables daily.She consumes 1 sweetened beverage(s) daily.She exercises with enough effort to increase her heart rate 10 to 19  "minutes per day.  She exercises with enough effort to increase her heart rate 3 or less days per week.   She is taking medications regularly.     Patient presents today with her  Gino for a discussion of her current medications and nausea in early pregnancy.  She has diagnoses including depression, anxiety and ADHD and follows with Psychiatry.  She also has migraine headaches for which she sees Neurology.  She previously used her OCP continuously, and so does not know when she became pregnant, but had some nausea and took a home pregnancy test which was positive.  She thinks her nausea started about 2 weeks ago.  She stopped all her medications when she found out she was pregnant after she was unable to reach her Neurologist or Psychiatrist to discuss further.  She denies leakage of fluid or vaginal bleeding.        Review of Systems  Constitutional, HEENT, cardiovascular, pulmonary, gi and gu systems are negative, except as otherwise noted.      Objective    /76   Pulse 72   Temp 97.9  F (36.6  C) (Oral)   Resp 16   Ht 1.537 m (5' 0.5\")   Wt 69.7 kg (153 lb 9.6 oz)   LMP  (LMP Unknown)   SpO2 100%   BMI 29.50 kg/m    Body mass index is 29.5 kg/m .  Physical Exam   GENERAL: alert and no distress  RESP: breathing comfortably, no cough during the visit  NEURO: Normal strength and tone, mentation intact and speech normal  PSYCH: mentation appears normal, affect normal/bright    Diagnostics:  No results found for any visits on 02/01/24.        Signed Electronically by: Vale Pringle MD    "

## 2024-02-02 PROBLEM — Z34.90 PREGNANCY WITH GESTATION OF UNKNOWN DURATION: Status: ACTIVE | Noted: 2024-02-02

## 2024-02-05 ENCOUNTER — ANCILLARY PROCEDURE (OUTPATIENT)
Dept: ULTRASOUND IMAGING | Facility: CLINIC | Age: 29
End: 2024-02-05
Attending: FAMILY MEDICINE
Payer: COMMERCIAL

## 2024-02-05 DIAGNOSIS — Z34.90 PREGNANCY WITH GESTATION OF UNKNOWN DURATION: ICD-10-CM

## 2024-02-05 PROCEDURE — 76817 TRANSVAGINAL US OBSTETRIC: CPT | Mod: TC | Performed by: RADIOLOGY

## 2024-02-05 PROCEDURE — 76801 OB US < 14 WKS SINGLE FETUS: CPT | Mod: TC | Performed by: RADIOLOGY

## 2024-02-06 ENCOUNTER — TELEPHONE (OUTPATIENT)
Dept: FAMILY MEDICINE | Facility: CLINIC | Age: 29
End: 2024-02-06

## 2024-02-06 NOTE — TELEPHONE ENCOUNTER
TCB to reschedule appointment on 3/7/24.  Dr. Pringle needs 40 minutes for initial OB visit. Can reschedule to 3/5/24 check in at 1:40 PM with Dr. Pringle at [place].

## 2024-02-08 ENCOUNTER — MYC MEDICAL ADVICE (OUTPATIENT)
Dept: FAMILY MEDICINE | Facility: CLINIC | Age: 29
End: 2024-02-08
Payer: COMMERCIAL

## 2024-02-08 NOTE — TELEPHONE ENCOUNTER
LMTCB. Please ask patient to reschedule her initial OB to 3/5/24 check-in at 1:40 PM with Dr. Pringle.

## 2024-03-04 LAB
ABO/RH(D): NORMAL
ANTIBODY SCREEN: NEGATIVE
SPECIMEN EXPIRATION DATE: NORMAL

## 2024-03-05 ENCOUNTER — PRENATAL OFFICE VISIT (OUTPATIENT)
Dept: FAMILY MEDICINE | Facility: CLINIC | Age: 29
End: 2024-03-05
Attending: FAMILY MEDICINE
Payer: COMMERCIAL

## 2024-03-05 VITALS
HEART RATE: 91 BPM | DIASTOLIC BLOOD PRESSURE: 73 MMHG | OXYGEN SATURATION: 96 % | BODY MASS INDEX: 29.11 KG/M2 | HEIGHT: 61 IN | SYSTOLIC BLOOD PRESSURE: 110 MMHG | RESPIRATION RATE: 20 BRPM | TEMPERATURE: 98.6 F | WEIGHT: 154.2 LBS

## 2024-03-05 DIAGNOSIS — Z34.01 ENCOUNTER FOR SUPERVISION OF NORMAL FIRST PREGNANCY IN FIRST TRIMESTER: Primary | ICD-10-CM

## 2024-03-05 DIAGNOSIS — N83.292 COMPLEX CYST OF LEFT OVARY: ICD-10-CM

## 2024-03-05 LAB
ALBUMIN UR-MCNC: NEGATIVE MG/DL
APPEARANCE UR: CLEAR
BILIRUB UR QL STRIP: NEGATIVE
COLOR UR AUTO: YELLOW
ERYTHROCYTE [DISTWIDTH] IN BLOOD BY AUTOMATED COUNT: 11.7 % (ref 10–15)
GLUCOSE UR STRIP-MCNC: NEGATIVE MG/DL
HCT VFR BLD AUTO: 37.1 % (ref 35–47)
HGB BLD-MCNC: 12.9 G/DL (ref 11.7–15.7)
HGB UR QL STRIP: ABNORMAL
KETONES UR STRIP-MCNC: ABNORMAL MG/DL
LEUKOCYTE ESTERASE UR QL STRIP: ABNORMAL
MCH RBC QN AUTO: 30.8 PG (ref 26.5–33)
MCHC RBC AUTO-ENTMCNC: 34.8 G/DL (ref 31.5–36.5)
MCV RBC AUTO: 89 FL (ref 78–100)
NITRATE UR QL: NEGATIVE
PH UR STRIP: 6.5 [PH] (ref 5–8)
PLATELET # BLD AUTO: 313 10E3/UL (ref 150–450)
RBC # BLD AUTO: 4.19 10E6/UL (ref 3.8–5.2)
SP GR UR STRIP: 1.01 (ref 1–1.03)
UROBILINOGEN UR STRIP-ACNC: 0.2 E.U./DL
WBC # BLD AUTO: 9.7 10E3/UL (ref 4–11)

## 2024-03-05 PROCEDURE — 86901 BLOOD TYPING SEROLOGIC RH(D): CPT | Performed by: FAMILY MEDICINE

## 2024-03-05 PROCEDURE — 87591 N.GONORRHOEAE DNA AMP PROB: CPT | Performed by: FAMILY MEDICINE

## 2024-03-05 PROCEDURE — 87389 HIV-1 AG W/HIV-1&-2 AB AG IA: CPT | Performed by: FAMILY MEDICINE

## 2024-03-05 PROCEDURE — 36415 COLL VENOUS BLD VENIPUNCTURE: CPT | Performed by: FAMILY MEDICINE

## 2024-03-05 PROCEDURE — 81003 URINALYSIS AUTO W/O SCOPE: CPT | Performed by: FAMILY MEDICINE

## 2024-03-05 PROCEDURE — 90686 IIV4 VACC NO PRSV 0.5 ML IM: CPT | Performed by: FAMILY MEDICINE

## 2024-03-05 PROCEDURE — 87086 URINE CULTURE/COLONY COUNT: CPT | Performed by: FAMILY MEDICINE

## 2024-03-05 PROCEDURE — 86900 BLOOD TYPING SEROLOGIC ABO: CPT | Performed by: FAMILY MEDICINE

## 2024-03-05 PROCEDURE — 86850 RBC ANTIBODY SCREEN: CPT | Performed by: FAMILY MEDICINE

## 2024-03-05 PROCEDURE — 99214 OFFICE O/P EST MOD 30 MIN: CPT | Mod: 25 | Performed by: FAMILY MEDICINE

## 2024-03-05 PROCEDURE — 87340 HEPATITIS B SURFACE AG IA: CPT | Performed by: FAMILY MEDICINE

## 2024-03-05 PROCEDURE — 90471 IMMUNIZATION ADMIN: CPT | Performed by: FAMILY MEDICINE

## 2024-03-05 PROCEDURE — 86780 TREPONEMA PALLIDUM: CPT | Performed by: FAMILY MEDICINE

## 2024-03-05 PROCEDURE — 85027 COMPLETE CBC AUTOMATED: CPT | Performed by: FAMILY MEDICINE

## 2024-03-05 PROCEDURE — 86762 RUBELLA ANTIBODY: CPT | Performed by: FAMILY MEDICINE

## 2024-03-05 PROCEDURE — 87491 CHLMYD TRACH DNA AMP PROBE: CPT | Performed by: FAMILY MEDICINE

## 2024-03-05 NOTE — PATIENT INSTRUCTIONS
Weeks 10 to 14 of Your Pregnancy: Care Instructions  It's now possible to hear the fetus's heartbeat with a special ultrasound device. And the fetus's organs are developing.    Decide about tests to check for birth defects. Think about your age, your chance of passing on a family disease, your need to know about any problems, and what you might do after you have the test results.   It's okay to exercise. Try activities such as walking or swimming. Check with your doctor before starting a new program.     You may feel more tired than usual.  Taking naps during the day may help.     You may feel emotional.  It might help to talk to someone.     You may have headaches.  Try lying down and putting a cool cloth over your forehead.     You can use acetaminophen (Tylenol) for pain relief.  Don't take any anti-inflammatory medicines (such as Advil, Motrin, Aleve), unless your doctor says it's okay.     You may feel a fullness or aching in your lower belly.  This can feel like the kind of cramps you might get before a period. A back rub may help.     You may need to urinate more.  Your growing uterus and changing hormones can affect your bladder.     You may feel sick to your stomach (morning sickness).  Try avoiding food and smells that make you feel sick.     Your breasts may feel different.  They may feel tender or get bigger. Your nipples may get darker. Try a bra that gives you good support.     Avoid alcohol, tobacco, and drugs (including marijuana).  If you need help quitting, talk to your doctor.     Take a daily prenatal vitamin.  Choose one with folic acid.   Follow-up care is a key part of your treatment and safety. Be sure to make and go to all appointments, and call your doctor if you are having problems. It's also a good idea to know your test results and keep a list of the medicines you take.  Where can you learn more?  Go to https://www.healthwise.net/patiented  Enter E090 in the search box to learn more  "about \"Weeks 10 to 14 of Your Pregnancy: Care Instructions.\"  Current as of: July 11, 2023               Content Version: 13.8    9647-2825 [employer].   Care instructions adapted under license by your healthcare professional. If you have questions about a medical condition or this instruction, always ask your healthcare professional. [employer] disclaims any warranty or liability for your use of this information.      Nutrition During Pregnancy: Care Instructions  Overview     Healthy eating when you are pregnant is important for you and your baby. It can help you feel well and have a successful pregnancy and delivery. During pregnancy your nutrition needs increase. Even if you have excellent eating habits, your doctor may recommend a multivitamin to make sure you get enough iron and folic acid.  You may wonder how much weight you should gain. In general, if you were at a healthy weight before you became pregnant, then you should gain between 25 and 35 pounds. If you were overweight before pregnancy, then you'll likely be advised to gain 15 to 25 pounds. If you were underweight before pregnancy, then you'll probably be advised to gain 28 to 40 pounds. Your doctor will work with you to set a weight goal that is right for you. Gaining a healthy amount of weight helps you have a healthy baby.  Follow-up care is a key part of your treatment and safety. Be sure to make and go to all appointments, and call your doctor if you are having problems. It's also a good idea to know your test results and keep a list of the medicines you take.  How can you care for yourself at home?  Eat plenty of fruits and vegetables. Include a variety of orange, yellow, and leafy dark-green vegetables every day.  Choose whole-grain bread, cereal, and pasta. Good choices include whole wheat bread, whole wheat pasta, brown rice, and oatmeal.  Get 4 or more servings of milk and milk products each day. Good choices " "include nonfat or low-fat milk, yogurt, and cheese. If you cannot eat milk products, you can get calcium from calcium-fortified products such as orange juice, soy milk, and tofu. Other non-milk sources of calcium include leafy green vegetables, such as broccoli, kale, mustard greens, turnip greens, bok eliana, and brussels sprouts.  If you eat meat, pick lower-fat types. Good choices include lean cuts of meat and chicken or turkey without the skin.  Do not eat shark, swordfish, aliza mackerel, or tilefish. They have high levels of mercury, which is dangerous to your baby. You can eat up to 12 ounces a week of fish or shellfish that have low mercury levels. Good choices include shrimp, wild salmon, pollock, and catfish. Limit some other types of fish, such as white (albacore) tuna, to 4 oz (0.1 kg) a week.  Heat lunch meats (such as turkey, ham, or bologna) to 165 F before you eat them. This reduces your risk of getting sick from a kind of bacteria that can be found in lunch meats.  Do not eat unpasteurized soft cheeses, such as brie, feta, fresh mozzarella, and blue cheese. They have a bacteria that could harm your baby.  Limit caffeine. If you drink coffee or tea, have no more than 1 cup a day. Caffeine is also found in pia.  Do not drink any alcohol. No amount of alcohol has been found to be safe during pregnancy.  Do not diet or try to lose weight. For example, do not follow a low-carbohydrate diet. If you are overweight at the start of your pregnancy, your doctor will work with you to manage your weight gain.  Tell your doctor about all vitamins and supplements you take.  When should you call for help?  Watch closely for changes in your health, and be sure to contact your doctor if you have any problems.  Where can you learn more?  Go to https://www.healthwise.net/patiented  Enter Y785 in the search box to learn more about \"Nutrition During Pregnancy: Care Instructions.\"  Current as of: February 28, " 2023               Content Version: 13.8    1617-9148 [employer].   Care instructions adapted under license by your healthcare professional. If you have questions about a medical condition or this instruction, always ask your healthcare professional. [employer] disclaims any warranty or liability for your use of this information.      Exercise During Pregnancy: Care Instructions  Overview     Exercise is good for you during a healthy pregnancy. It can relieve back pain, swelling, and other discomforts. It also prepares your muscles for childbirth. And exercise can improve your energy level and help you sleep better.  If your doctor advises it, get more exercise. For example, walking is a good choice. Bit by bit, increase the amount you walk every day. Try for at least 30 minutes on most days of the week. You could also try a prenatal exercise class. But if you do not already exercise, be sure to talk with your doctor before you start a new exercise program. Doctors do not recommend contact sports during pregnancy.  Follow-up care is a key part of your treatment and safety. Be sure to make and go to all appointments, and call your doctor if you are having problems. It's also a good idea to know your test results and keep a list of the medicines you take.  How can you care for yourself at home?  Talk with your doctor about the right kind of exercise for each stage of pregnancy.  Listen to your body to know if your exercise is at a safe level.  Do not become overheated while you exercise. High body temperature can be harmful. Avoid activities that can make your body too hot.  If you feel tired, take it easy. You might walk instead of run.  If you are used to strenuous exercise, ask your doctor how to know when it's time to slow down.  If you exercised before getting pregnant, you should be able to keep up your routine early in your pregnancy. Later in your pregnancy, you may want to switch  "to more gentle activities.  Drink plenty of fluids before, during, and after exercise.  Avoid contact sports, such as soccer and basketball. Also avoid risky activities. These include scuba diving, horseback riding, and exercising at a high altitude (above 6,000 feet). If you live in a place with a high altitude, talk to your doctor about how you can exercise safely.  Do not get overtired while you exercise. You should be able to talk while you work out.  After your fourth month of pregnancy, avoid exercises that require you to lie flat on your back on a hard surface. These include sit-ups and some yoga poses.  Get plenty of rest. You may be very tired while you are pregnant.  Where can you learn more?  Go to https://www.[employer].net/patiented  Enter S801 in the search box to learn more about \"Exercise During Pregnancy: Care Instructions.\"  Current as of: July 11, 2023               Content Version: 13.8    2484-7271 [employer].   Care instructions adapted under license by your healthcare professional. If you have questions about a medical condition or this instruction, always ask your healthcare professional. [employer] disclaims any warranty or liability for your use of this information.      You have been provided the CDC Warning Signs in Pregnancy document.    Additional copies can be found here: www.[place].com/098617.pdf  "

## 2024-03-05 NOTE — PROGRESS NOTES
"  SUBJECTIVE:     HPI:    This is a 28 year old female patient,  who presents for her first obstetrical visit.    ALEXANDRO: 2024, by Ultrasound.  She is 11w1d weeks.  Her cycles are irregular.  Her last menstrual period was unknown (dating by ultrasound).   Since her LMP, she has experienced  nausea, fatigue, and loss of appetite.   She denies dysuria, pelvic pain, and vaginal bleeding.    Additional History: Unplanned pregnancy, but welcomed.  Feeling a bit better in terms of nausea.  Mood has been stable.  Had an ovarian cyst seen on dating ultrasound, denies pain in this area.    Have you travelled during the pregnancy?No  Have your sexual partner(s) travelled during the pregnancy?No    HISTORY:   Planned Pregnancy: No  Marital Status:   Occupation: Not discussed  Living in Household: Spouse    Past History:  Her past medical history   Past Medical History:   Diagnosis Date    Anxiety     Controlled substance agreement signed 2019    Depressive disorder     IBS (irritable bowel syndrome)     Uncomplicated asthma    .      She has a history of   no previous pregnancies    Since her last LMP she denies use of alcohol, tobacco and street drugs.    Past medical, surgical, social and family history were reviewed and updated in [facility].      Current Outpatient Medications   Medication    b complex vitamins capsule    ondansetron (ZOFRAN) 4 MG tablet    polyethylene glycol (MIRALAX) 17 gram/dose powder    Prenatal Vit-Fe Fumarate-FA (PRENATAL MULTIVITAMIN  PLUS IRON) 27-1 MG TABS    amphetamine-dextroamphetamine (ADDERALL) 10 MG tablet    hydrOXYzine (ATARAX) 25 MG tablet     No current facility-administered medications for this visit.       ROS:   12 point review of systems negative other than symptoms noted below or in the HPI.      OBJECTIVE:     EXAM:  /73   Pulse 91   Temp 98.6  F (37  C) (Oral)   Resp 20   Ht 1.537 m (5' 0.5\")   Wt 69.9 kg (154 lb 3.2 oz)   LMP  (LMP Unknown)   SpO2 96%  "  BMI 29.62 kg/m   Body mass index is 29.62 kg/m .    GENERAL: alert and no distress  EYES: Eyes grossly normal to inspection, PERRL and conjunctivae and sclerae normal  HENT: ear canals and TM's normal, nose and mouth without ulcers or lesions  NECK: no adenopathy, no asymmetry, masses, or scars  RESP: lungs clear to auscultation - no rales, rhonchi or wheezes  BREAST: normal without masses, tenderness or nipple discharge and no palpable axillary masses or adenopathy  CV: regular rate and rhythm, normal S1 S2, no S3 or S4, no murmur, click or rub, no peripheral edema  ABDOMEN: soft, nontender, no hepatosplenomegaly, no masses and bowel sounds normal   (female): normal female external genitalia, normal urethral meatus, normal vaginal mucosa  MS: no gross musculoskeletal defects noted, no edema  SKIN: no suspicious lesions or rashes  NEURO: Normal strength and tone, mentation intact and speech normal  PSYCH: mentation appears normal, affect normal/bright    ASSESSMENT/PLAN:     1. Encounter for supervision of normal first pregnancy in first trimester  Normal routine first OB visit.  Discussed genetic history and patient declines NIPT.  Routine labs performed as below.  Plan next routine prenatal visit in 4 weeks.  - Chlamydia trachomatis PCR  - Neisseria gonorrhoeae PCR  - UA with Micro  - ABO/Rh type and screen  - Hepatitis B surface antigen  - CBC with platelets  - HIV Antigen Antibody Combo  - Rubella Antibody IgG  - Treponema Abs w Reflex to RPR and Titer  - Urine Culture    2. Complex cyst of left ovary  Recommend recheck with ultrasound to see if this stabilizes in size or is starting to involute.  - US OB < 14 Weeks Single; Future      28 year old , 11w1d weeks of pregnancy with ALEXANDRO of 2024, by Ultrasound    Discussed as follows: see above and AVS    Counseling given:   - Follow up in 4-6 weeks for return OB visit.  - Recommended weight gain for pregnancy: 15-25 lbs.      PLAN/PATIENT  INSTRUCTIONS:    See above and AVS     Vale Pringle MD

## 2024-03-06 LAB
C TRACH DNA SPEC QL NAA+PROBE: NEGATIVE
HBV SURFACE AG SERPL QL IA: NONREACTIVE
HIV 1+2 AB+HIV1 P24 AG SERPL QL IA: NONREACTIVE
N GONORRHOEA DNA SPEC QL NAA+PROBE: NEGATIVE
RUBV IGG SERPL QL IA: 3.49 INDEX
RUBV IGG SERPL QL IA: POSITIVE
T PALLIDUM AB SER QL: NONREACTIVE

## 2024-03-07 ENCOUNTER — ANCILLARY PROCEDURE (OUTPATIENT)
Dept: ULTRASOUND IMAGING | Facility: CLINIC | Age: 29
End: 2024-03-07
Attending: FAMILY MEDICINE
Payer: COMMERCIAL

## 2024-03-07 ENCOUNTER — MEDICAL CORRESPONDENCE (OUTPATIENT)
Dept: HEALTH INFORMATION MANAGEMENT | Facility: CLINIC | Age: 29
End: 2024-03-07

## 2024-03-07 DIAGNOSIS — N83.292 COMPLEX CYST OF LEFT OVARY: ICD-10-CM

## 2024-03-07 LAB — BACTERIA UR CULT: NORMAL

## 2024-03-07 PROCEDURE — 76801 OB US < 14 WKS SINGLE FETUS: CPT | Mod: TC | Performed by: RADIOLOGY

## 2024-03-16 PROBLEM — Z34.01 ENCOUNTER FOR SUPERVISION OF NORMAL FIRST PREGNANCY IN FIRST TRIMESTER: Status: ACTIVE | Noted: 2024-03-16

## 2024-03-16 PROBLEM — N83.292 COMPLEX CYST OF LEFT OVARY: Status: ACTIVE | Noted: 2024-03-16

## 2024-03-16 PROBLEM — Z34.90 PREGNANCY WITH GESTATION OF UNKNOWN DURATION: Status: RESOLVED | Noted: 2024-02-02 | Resolved: 2024-03-16

## 2024-04-04 ENCOUNTER — PRENATAL OFFICE VISIT (OUTPATIENT)
Dept: FAMILY MEDICINE | Facility: CLINIC | Age: 29
End: 2024-04-04
Payer: COMMERCIAL

## 2024-04-04 VITALS
TEMPERATURE: 98.6 F | BODY MASS INDEX: 29.64 KG/M2 | RESPIRATION RATE: 20 BRPM | OXYGEN SATURATION: 95 % | DIASTOLIC BLOOD PRESSURE: 72 MMHG | HEART RATE: 98 BPM | WEIGHT: 157 LBS | HEIGHT: 61 IN | SYSTOLIC BLOOD PRESSURE: 111 MMHG

## 2024-04-04 DIAGNOSIS — O21.9 VOMITING OR NAUSEA OF PREGNANCY: ICD-10-CM

## 2024-04-04 DIAGNOSIS — N83.292 COMPLEX CYST OF LEFT OVARY: ICD-10-CM

## 2024-04-04 DIAGNOSIS — Z34.02 ENCOUNTER FOR SUPERVISION OF NORMAL FIRST PREGNANCY IN SECOND TRIMESTER: Primary | ICD-10-CM

## 2024-04-04 PROCEDURE — 99207 PR PRENATAL VISIT: CPT | Performed by: FAMILY MEDICINE

## 2024-04-04 RX ORDER — ONDANSETRON 4 MG/1
4 TABLET, FILM COATED ORAL EVERY 8 HOURS PRN
Qty: 30 TABLET | Refills: 1 | Status: SHIPPED | OUTPATIENT
Start: 2024-04-04 | End: 2024-06-07

## 2024-04-04 NOTE — PATIENT INSTRUCTIONS
"Weeks 14 to 18 of Your Pregnancy: Care Instructions  Around this time, you may start to look pregnant. Your baby is now able to pass urine. And the first stool (meconium) is starting to collect in your baby's intestines. Hair is starting to grow on your baby's head.    You may notice some skin changes, such as itchy spots on your palms or acne on your face.    At your next doctor visit, you may have an ultrasound. So you might think about whether you want to know the sex of your baby. Also ask your doctor about flu and COVID-19 shots.     How to reduce stress   Ask for help when you need it.  Try to avoid things that cause you stress.  Seek out things that relieve stress, such as breathing exercises or yoga.     How to get exercise   If you don't usually exercise, start slowly. Short walks may be a good choice.  Try to be active 30 minutes a day, at least 5 days a week.  Avoid activities where you're more likely to fall.  Use light weights to reduce stress on your joints.     How to stay at a healthy weight for you   Talk to your doctor or midwife about how much weight you should gain.  It's generally best to gain:  About 28 to 40 pounds if you're underweight.  About 25 to 35 pounds if you're at a healthy weight.  About 15 to 25 pounds if you're overweight.  About 11 to 20 pounds if you're very overweight (obese).  Follow-up care is a key part of your treatment and safety. Be sure to make and go to all appointments, and call your doctor if you are having problems. It's also a good idea to know your test results and keep a list of the medicines you take.  Where can you learn more?  Go to https://www.[employer].net/patiented  Enter I453 in the search box to learn more about \"Weeks 14 to 18 of Your Pregnancy: Care Instructions.\"  Current as of: July 10, 2023               Content Version: 14.0    1914-6504 [employer].   Care instructions adapted under license by your healthcare professional. If you have " questions about a medical condition or this instruction, always ask your healthcare professional. [employer], [facility] disclaims any warranty or liability for your use of this information.      Second-Trimester Fetal Ultrasound: About This Test  What is it?     Fetal ultrasound is a test that uses sound waves to make pictures of your baby (fetus) and placenta inside the uterus. The test is the safest way to find out the age, size, and position of your baby. You also may be able to find out the sex of your baby. (But the test isn't done just to find out a baby's sex.)  No known risks to the mother or the baby are linked to fetal ultrasound. But you may feel anxious if the test reveals a problem with your pregnancy or baby.  Why is this test done?  In the second trimester, a fetal ultrasound is done to:  Estimate the number of weeks and days a fetus has developed since the beginning of the pregnancy. This is called the gestational age.  Look at the size and position of the fetus, the placenta, and the fluid that surrounds the fetus.  Find major birth defects, such as heart problems or problems with the brain and spinal cord (neural tube defects). But the test may not be able to find many minor defects and some major birth defects.  How do you prepare for the test?  In general, there's nothing you have to do before this test, unless your doctor tells you to.  How is the test done?  You may be able to leave your clothes on, or you will be given a gown to wear.  You will lie on your back on a padded examination table.  A gel will be spread on your belly. It will be removed after the test.  A small, handheld device called a transducer will be pressed against the gel on your skin and moved across your belly several times.  You may watch the monitor to see the picture of your baby during the test.  What happens after the test?  You will probably be able to go home right away.  You most likely will be able to go back to  "your usual activities right away.  Follow-up care is a key part of your treatment and safety. Be sure to make and go to all appointments, and call your doctor if you are having problems. It's also a good idea to keep a list of the medicines you take. Ask your doctor when you can expect to have your test results.  Where can you learn more?  Go to https://[email or web address]/patiented  Enter Y671 in the search box to learn more about \"Second-Trimester Fetal Ultrasound: About This Test.\"  Current as of: July 10, 2023               Content Version: 14.0    5914-9058 [employer].   Care instructions adapted under license by your healthcare professional. If you have questions about a medical condition or this instruction, always ask your healthcare professional. [employer] disclaims any warranty or liability for your use of this information.      During Pregnancy: Exercises  Introduction  Here are some examples of exercises to do during your pregnancy. Start each exercise slowly. Ease off the exercise if you start to have pain.  Talk to your doctor about when you can start these exercises and which ones will work best for you.  How to do the exercises  Neck rotation    Sit up straight in a firm chair, or stand up straight. If you're standing, keep your feet about hip-width apart.  Keeping your chin level, turn your head to the right and hold for 15 to 30 seconds.  Turn your head to the left and hold for 15 to 30 seconds.  Repeat 2 to 4 times.  Neck stretch to the front    Sit up straight in a firm chair, or stand up straight. Look straight ahead. If you're standing, keep your feet about hip-width apart.  Slowly bend your head forward without moving your shoulders.  Hold for 15 to 30 seconds, then return to your starting position.  Repeat 2 to 4 times.  Back press    Stand with your back 10 to 12 inches away from a wall.  Lean into the wall until your back is against it. Press your lower back " against the wall by pulling in your stomach muscles.  Slowly slide down until your knees are slightly bent, pressing your lower back against the wall.  Hold for at least 6 seconds, then slide back up the wall.  Repeat 8 to 12 times.  Over time, work up to holding this position for as much as 1 minute.  Trunk twist    Sit on the floor with your legs crossed. If that's not comfortable, you can sit on a folded blanket so your bottom is a few inches off the floor. Or you can sit on a chair with your knees hip-width apart and your feet flat on the floor.  Reach your left hand toward your right knee. You can place your right hand at your side for support.  Slowly twist your body (trunk) to your right.  Relax and return to your starting position.  Repeat 2 to 4 times.  Switch your hands and twist to your left.  Repeat 2 to 4 times.  Pelvic rocking on hands and knees    Start on your hands and knees. Place your wrists directly below your shoulders and your knees below your hips.  Breathe in slowly. Tuck your head downward and round your back up, making a curve with your back in the shape of the letter C. Hold this position for about 6 seconds.  Breathe out slowly and bring your head back up. Relax, keeping your back straight. (Don't allow it to curve toward the floor.) Hold for about 6 seconds.  Repeat 8 to 12 times, gently rocking your pelvis.  Pelvic tilt    Lie on your back with your knees bent and your feet flat on the floor.  Tighten your belly muscles by pulling your belly button in toward your spine. Press your lower back to the floor. You should feel your hips and pelvis rock back.  Hold for 6 seconds while breathing smoothly, and then relax.  Repeat 8 to 12 times.  Do this exercise only during the first 4 months of pregnancy. After this point, lying on your back is not recommended, because it can cause blood flow problems for you and your baby.  Backward stretch    Start on your hands and knees with your knees 8 to  10 inches apart, hands directly below your shoulders, and arms and back straight.  Keeping your arms straight, slowly lower your buttocks toward your heels and tuck your head toward your knees. Hold for 15 to 30 seconds.  Slowly return to the starting position.  Repeat 2 to 4 times.  Forward bend    Sit comfortably in a chair, with your arms relaxed.  Slowly bend forward, allowing your arms to hang down. Lean only as far as you can without feeling discomfort or pressure on your belly.  Hold for 15 to 30 seconds and then slowly sit up straight.  Repeat 2 to 4 times or to your comfort level.  Donkey kick    Start on your hands and knees. Place your hands directly below your shoulders, and keep your arms straight.  Tighten your belly muscles by pulling your belly button in toward your spine. Keep breathing normally, and don't hold your breath.  Lift one knee and bring it toward your elbow.  Slowly extend that leg behind you without completely straightening it. Be careful not to let your hip drop down. Avoid arching your back.  Hold your leg behind you for about 6 seconds.  Return to your starting position.  Repeat 8 to 12 times for each leg.  Tailor sitting    Sit on the floor.  Bring your feet close to your body while crossing your ankles.  Keep your back straight. Relax your legs and let your knees drop toward the floor.  Hold this position for as long as you are comfortable.  Toe reach    Sit on the floor with your back straight, legs about 12 inches apart, and feet relaxed outward.  Stretch your hands forward toward your right foot, then sit up.  Stretch your hands straight forward, then sit up.  Stretch your hands forward toward your left foot, then sit up.  Hold each stretch for 15 to 30 seconds.  Repeat 2 to 4 times.  Follow-up care is a key part of your treatment and safety. Be sure to make and go to all appointments, and call your doctor if you are having problems. It's also a good idea to know your test  results and keep a list of the medicines you take.  Current as of: July 10, 2023               Content Version: 14.0    5185-5777 [employer].   Care instructions adapted under license by your healthcare professional. If you have questions about a medical condition or this instruction, always ask your healthcare professional. [employer] disclaims any warranty or liability for your use of this information.

## 2024-04-04 NOTE — PROGRESS NOTES
Patient reports continued nausea if she does not take Zofran.  She has cut back to taking 1/2 pill, and then she is able to keep down food and fluids.  Weight gain between visits has been appropriate.  She denies leakage of fluid or bleeding, thinks she feels occasional fetal movement.  Discussed her left-sided ovarian cyst, which now appears simple but has grown slightly since her dating ultrasound.  Plan recheck with upcoming fetal survey.  Discussed signs or symptoms for ovarian torsion to watch out for.  She does have a history of ovarian cysts prior to pregnancy.    Concerns: None  Doing well.  No concerns today.  Reportable signs and symptoms discussed.  Will schedule anatomy ultrasound next visit.  RTC 4 weeks.      Vale Pringle MD

## 2024-04-30 ENCOUNTER — PRENATAL OFFICE VISIT (OUTPATIENT)
Dept: FAMILY MEDICINE | Facility: CLINIC | Age: 29
End: 2024-04-30
Payer: COMMERCIAL

## 2024-04-30 VITALS
HEIGHT: 61 IN | SYSTOLIC BLOOD PRESSURE: 97 MMHG | TEMPERATURE: 98.4 F | BODY MASS INDEX: 31 KG/M2 | WEIGHT: 164.2 LBS | RESPIRATION RATE: 20 BRPM | DIASTOLIC BLOOD PRESSURE: 64 MMHG | OXYGEN SATURATION: 95 % | HEART RATE: 103 BPM

## 2024-04-30 DIAGNOSIS — N83.209 OVARIAN CYST DURING PREGNANCY IN SECOND TRIMESTER: ICD-10-CM

## 2024-04-30 DIAGNOSIS — O34.82 OVARIAN CYST DURING PREGNANCY IN SECOND TRIMESTER: ICD-10-CM

## 2024-04-30 DIAGNOSIS — Z34.02 ENCOUNTER FOR SUPERVISION OF NORMAL FIRST PREGNANCY IN SECOND TRIMESTER: Primary | ICD-10-CM

## 2024-04-30 DIAGNOSIS — O21.9 VOMITING OR NAUSEA OF PREGNANCY: ICD-10-CM

## 2024-04-30 PROCEDURE — 99207 PR PRENATAL VISIT: CPT | Performed by: FAMILY MEDICINE

## 2024-04-30 NOTE — PATIENT INSTRUCTIONS

## 2024-05-06 ENCOUNTER — ANCILLARY PROCEDURE (OUTPATIENT)
Dept: ULTRASOUND IMAGING | Facility: CLINIC | Age: 29
End: 2024-05-06
Attending: FAMILY MEDICINE
Payer: COMMERCIAL

## 2024-05-06 DIAGNOSIS — O34.82 OVARIAN CYST DURING PREGNANCY IN SECOND TRIMESTER: ICD-10-CM

## 2024-05-06 DIAGNOSIS — N83.209 OVARIAN CYST DURING PREGNANCY IN SECOND TRIMESTER: ICD-10-CM

## 2024-05-06 DIAGNOSIS — Z34.02 ENCOUNTER FOR SUPERVISION OF NORMAL FIRST PREGNANCY IN SECOND TRIMESTER: ICD-10-CM

## 2024-05-06 PROCEDURE — 76805 OB US >/= 14 WKS SNGL FETUS: CPT | Mod: TC | Performed by: RADIOLOGY

## 2024-05-06 NOTE — PROGRESS NOTES
Patient reports that overall she has been feeling well.  She continues with some nausea here and there.  We discussed today that she has had a rather steep weight gain since last visit.  Discussed healthy diet and trying to get into a regular activity routine.  Discussed fetal survey ultrasound along with recheck of left-sided ovarian cyst.  Order placed today.  Patient will schedule at her convenience.  She denies leakage of fluid or bleeding, reports good fetal movement.  Concerns today: None  Doing well.  No concerns today.  No vaginal bleeding, no contractions/severe cramping, no leakage of fluid.  No vaginal discharge. No dysuria  No headache, vision changes, lower extremity swelling, upper abdominal pain, chest pain, shortness of breath.   Discussed PTL, PROM, and when to call or come in.  Reportable signs and symptoms discussed.      Vale Pringle MD

## 2024-05-30 ENCOUNTER — PRENATAL OFFICE VISIT (OUTPATIENT)
Dept: FAMILY MEDICINE | Facility: CLINIC | Age: 29
End: 2024-05-30
Payer: COMMERCIAL

## 2024-05-30 VITALS
DIASTOLIC BLOOD PRESSURE: 70 MMHG | RESPIRATION RATE: 20 BRPM | BODY MASS INDEX: 32.36 KG/M2 | SYSTOLIC BLOOD PRESSURE: 102 MMHG | HEIGHT: 61 IN | TEMPERATURE: 98.5 F | OXYGEN SATURATION: 94 % | HEART RATE: 95 BPM | WEIGHT: 171.4 LBS

## 2024-05-30 DIAGNOSIS — Z34.02 ENCOUNTER FOR SUPERVISION OF NORMAL FIRST PREGNANCY IN SECOND TRIMESTER: Primary | ICD-10-CM

## 2024-05-30 DIAGNOSIS — F33.0 MILD EPISODE OF RECURRENT MAJOR DEPRESSIVE DISORDER (H): ICD-10-CM

## 2024-05-30 DIAGNOSIS — F41.8 OTHER SPECIFIED ANXIETY DISORDERS: ICD-10-CM

## 2024-05-30 PROCEDURE — 99207 PR PRENATAL VISIT: CPT | Performed by: FAMILY MEDICINE

## 2024-05-30 ASSESSMENT — ASTHMA QUESTIONNAIRES
QUESTION_4 LAST FOUR WEEKS HOW OFTEN HAVE YOU USED YOUR RESCUE INHALER OR NEBULIZER MEDICATION (SUCH AS ALBUTEROL): NOT AT ALL
QUESTION_5 LAST FOUR WEEKS HOW WOULD YOU RATE YOUR ASTHMA CONTROL: COMPLETELY CONTROLLED
ACT_TOTALSCORE: 24
QUESTION_2 LAST FOUR WEEKS HOW OFTEN HAVE YOU HAD SHORTNESS OF BREATH: ONCE OR TWICE A WEEK
QUESTION_3 LAST FOUR WEEKS HOW OFTEN DID YOUR ASTHMA SYMPTOMS (WHEEZING, COUGHING, SHORTNESS OF BREATH, CHEST TIGHTNESS OR PAIN) WAKE YOU UP AT NIGHT OR EARLIER THAN USUAL IN THE MORNING: NOT AT ALL
QUESTION_1 LAST FOUR WEEKS HOW MUCH OF THE TIME DID YOUR ASTHMA KEEP YOU FROM GETTING AS MUCH DONE AT WORK, SCHOOL OR AT HOME: NONE OF THE TIME
ACT_TOTALSCORE: 24

## 2024-05-30 ASSESSMENT — PATIENT HEALTH QUESTIONNAIRE - PHQ9
SUM OF ALL RESPONSES TO PHQ QUESTIONS 1-9: 8
10. IF YOU CHECKED OFF ANY PROBLEMS, HOW DIFFICULT HAVE THESE PROBLEMS MADE IT FOR YOU TO DO YOUR WORK, TAKE CARE OF THINGS AT HOME, OR GET ALONG WITH OTHER PEOPLE: SOMEWHAT DIFFICULT
SUM OF ALL RESPONSES TO PHQ QUESTIONS 1-9: 8

## 2024-05-30 NOTE — PATIENT INSTRUCTIONS
The company that we previously used for prenatal classes is called [employer].  They now have online offerings.      Weeks 22 to 26 of Your Pregnancy: Care Instructions  Your baby's lungs are getting ready for breathing. Your baby may respond to your voice. Your baby likely turns less, and kicks or jerks more. Jerking may mean that your baby has hiccups.    Think about taking childbirth classes. And start to think about whether you want to have pain medicine during labor.    At your next doctor visit, you may be tested for anemia and for high blood sugar that first occurs during pregnancy (gestational diabetes). These conditions can cause problems for you and your baby.    To ease discomfort, such as back pain    Change your position often. Try not to sit or stand for too long.  Get some exercise. Things like walking or stretching may help.  Try using a heating pad or cold pack.    To ease or reduce swelling in your feet, ankles, hands, and fingers    Take off your rings.  Avoid high-sodium foods, such as potato chips.  Prop up your feet, and sleep with pillows under your feet.  Try to avoid standing for long periods of time.  Do not wear tight shoes.  Wear support stockings.  Kegel exercises to prevent urine from leaking    Squeeze your muscles as if you were trying not to pass gas. Your belly, legs, and buttocks shouldn't move. Hold the squeeze for 3 seconds, then relax for 5 to 10 seconds.    Add 1 second each week until you can squeeze for 10 seconds. Repeat the exercise 10 times a session. Do 3 to 8 sessions a day. If these exercises cause you pain, stop doing them and talk with your doctor.  Follow-up care is a key part of your treatment and safety. Be sure to make and go to all appointments, and call your doctor if you are having problems. It's also a good idea to know your test results and keep a list of the medicines you take.  Where can you learn more?  Go to https://www.healthwise.net/patiented  Enter  "G264 in the search box to learn more about \"Weeks 22 to 26 of Your Pregnancy: Care Instructions.\"  Current as of: July 10, 2023               Content Version: 14.0    7777-7460 [employer].   Care instructions adapted under license by your healthcare professional. If you have questions about a medical condition or this instruction, always ask your healthcare professional. [employer] disclaims any warranty or liability for your use of this information.      Round Ligament Pain: Care Instructions  Overview     Round ligament pain is a common pain during pregnancy. You may feel a sharp brief pain on one or both sides of your belly. It may go down into your groin. It's usually felt for the first time during the second trimester. This pain is a normal part of pregnancy.  Your uterus is supported by two ligaments that go from the top and sides of the uterus to the bones of the pelvis. These are the round ligaments. As your uterus grows, these ligaments stretch and tighten with your movements. This may be the cause of the pain. You may find that certain activities seem to cause pain. If you can, avoid those activities.  Your doctor can usually diagnose round ligament pain from your symptoms and an exam. If you have bleeding or other symptoms, your doctor may also do an imaging test, such as an ultrasound. Your doctor may suggest some things that can help the pain, such as rest and strengthening exercises.  Follow-up care is a key part of your treatment and safety. Be sure to make and go to all appointments, and call your doctor if you are having problems. It's also a good idea to know your test results and keep a list of the medicines you take.  How can you care for yourself at home?  If certain movements seem to trigger belly pain, see if you can avoid them or try moving more slowly so the ligaments don't stretch quickly.  Stay active. If your doctor says it's okay, try moderate exercise. You " "might try things like swimming, walking, or stretching. Ask your doctor about strengthening and stretching exercises that may help.  Try a heating pad or cold pack on the area. A warm bath or shower may also help.  Rest when you can.  Ask your doctor about taking acetaminophen for pain. Be safe with medicines. Read and follow all instructions on the label.  Try a belly support band. Some people find that these can help.  When should you call for help?   Call your doctor now or seek immediate medical care if:    You think you might be in labor.     You have new or worse pain.   Watch closely for changes in your health, and be sure to contact your doctor if you have any problems.  Where can you learn more?  Go to https://www.[employer].net/patiented  Enter R110 in the search box to learn more about \"Round Ligament Pain: Care Instructions.\"  Current as of: July 10, 2023               Content Version: 14.0    4033-2853 [employer].   Care instructions adapted under license by your healthcare professional. If you have questions about a medical condition or this instruction, always ask your healthcare professional. [employer] disclaims any warranty or liability for your use of this information.      Leg and Ankle Edema: Care Instructions  Your Care Instructions  Swelling in the legs, ankles, and feet is called edema. It is common after you sit or stand for a while. Long plane flights or car rides often cause swelling in the legs and feet. You may also have swelling if you have to stand for long periods of time at your job. Problems with the veins in the legs (varicose veins) and changes in hormones can also cause swelling. Sometimes the swelling in the ankles and feet is caused by a more serious problem, such as heart failure, infection, blood clots, or liver or kidney disease.  Follow-up care is a key part of your treatment and safety. Be sure to make and go to all appointments, and call your " "doctor if you are having problems. It's also a good idea to know your test results and keep a list of the medicines you take.  How can you care for yourself at home?  If your doctor gave you medicine, take it as prescribed. Call your doctor if you think you are having a problem with your medicine.  Whenever you are resting, raise your legs up. Try to keep the swollen area higher than the level of your heart.  Take breaks from standing or sitting in one position.  Walk around to increase the blood flow in your lower legs.  Move your feet and ankles often while you stand, or tighten and relax your leg muscles.  Wear support stockings. Put them on in the morning, before swelling gets worse.  Eat a balanced diet. Lose weight if you need to.  Limit the amount of salt (sodium) in your diet. Salt holds fluid in the body and may increase swelling.  When should you call for help?   Call 911 anytime you think you may need emergency care. For example, call if:    You have symptoms of a blood clot in your lung (called a pulmonary embolism). These may include:  Sudden chest pain.  Trouble breathing.  Coughing up blood.   Call your doctor now or seek immediate medical care if:    You have signs of a blood clot, such as:  Pain in your calf, back of the knee, thigh, or groin.  Redness and swelling in your leg or groin.     You have symptoms of infection, such as:  Increased pain, swelling, warmth, or redness.  Red streaks or pus.  A fever.   Watch closely for changes in your health, and be sure to contact your doctor if:    Your swelling is getting worse.     You have new or worsening pain in your legs.     You do not get better as expected.   Where can you learn more?  Go to https://www.[employer].net/patiented  Enter N696 in the search box to learn more about \"Leg and Ankle Edema: Care Instructions.\"  Current as of: August 6, 2023               Content Version: 14.0    6274-6649 [employer].   Care instructions " adapted under license by your healthcare professional. If you have questions about a medical condition or this instruction, always ask your healthcare professional. [employer] disclaims any warranty or liability for your use of this information.      Back Pain During Pregnancy: Care Instructions  Overview     Back pain has many possible causes. It is often caused by problems with muscles and ligaments in your back. The extra weight during pregnancy can put stress on your back. Moving, lifting, standing, sitting, or sleeping in an awkward way also can strain your back. Back pain can also be a sign of labor. Although it may hurt a lot, back pain often improves on its own. Use good home treatment, and take care not to stress your back.  Follow-up care is a key part of your treatment and safety. Be sure to make and go to all appointments, and call your doctor if you are having problems. It's also a good idea to know your test results and keep a list of the medicines you take.  How can you care for yourself at home?  Ask your doctor about taking acetaminophen (Tylenol) for pain. Do not take aspirin, ibuprofen (Advil, Motrin), or naproxen (Aleve).  Do not take two or more pain medicines at the same time unless the doctor told you to. Many pain medicines have acetaminophen, which is Tylenol. Too much acetaminophen (Tylenol) can be harmful.  Try heat or ice, whichever feels better. Apply it for 10 to 20 minutes at a time, several times a day. Put a thin cloth between the heat or ice and your skin. A warm bath or shower may also help.  Lie on your left side with your knees and hips bent and a pillow between your legs. This reduces stress on your back.  Try to avoid standing or sitting for too long or heavy lifting. If your job requires lots of standing, sitting, or heavy lifting, ask your employer if you can take short breaks or adjust your work activity. You can ask your doctor to write a note requesting these  "breaks or other adjustments.  Wear supportive, low-heeled shoes. Avoid flat or high-heeled shoes.  Try a belly support band. Supporting your belly can take the strain off your back.  Ask your doctor about how much exercise you can do. Regular exercise such as swimming, water aerobics, walking, or stretching can help with back pain.  Ask your doctor about exercises to stretch, strengthen, and relax your muscles. Your doctor may recommend physical therapy.  When should you call for help?   Call your doctor now or seek immediate medical care if:    You've been having regular contractions for an hour. This means that you've had at least 6 contractions within 1 hour, even after you change your position and drink fluids.     You have new numbness in your buttocks, genital or rectal areas, or legs.     You have a new loss of bowel or bladder control.     You have symptoms of a urinary tract infection. These may include:  Pain or burning when you urinate.  A frequent need to urinate without being able to pass much urine.  Pain in the flank, which is just below the rib cage and above the waist on either side of the back.  Blood in your urine.   Watch closely for changes in your health, and be sure to contact your doctor if:    You do not get better as expected.   Where can you learn more?  Go to https://www.[employer].net/patiented  Enter C696 in the search box to learn more about \"Back Pain During Pregnancy: Care Instructions.\"  Current as of: July 10, 2023               Content Version: 14.0    4193-9459 [employer].   Care instructions adapted under license by your healthcare professional. If you have questions about a medical condition or this instruction, always ask your healthcare professional. [employer] disclaims any warranty or liability for your use of this information.       Labor: Care Instructions  Overview      labor is the start of labor between 20 and 36 weeks of " pregnancy. Most babies are born at 37 to 42 weeks of pregnancy. In labor, the uterus contracts to open the cervix. This is the first stage of childbirth.  labor can be caused by a problem with the baby, the mother, or both. Often the cause is not known.  In some cases, doctors use medicines to try to delay labor until 34 or more weeks of pregnancy. By this time, a baby has grown enough so that problems are not likely. In some cases--such as with a serious infection--it is healthier for the baby to be born early. Your treatment will depend on how far along you are in your pregnancy and on your health and your baby's health.  Follow-up care is a key part of your treatment and safety. Be sure to make and go to all appointments, and call your doctor if you are having problems. It's also a good idea to know your test results and keep a list of the medicines you take.  How can you care for yourself at home?  If your doctor prescribed medicines, take them exactly as directed. Call your doctor if you think you are having a problem with your medicine.  Rest until your doctor advises you about activity.  Do not have sexual intercourse unless your doctor says it is safe.  Use sanitary pads if you have vaginal bleeding. Using pads makes it easier to monitor your bleeding.  Do not smoke or allow others to smoke around you. If you need help quitting, talk to your doctor about stop-smoking programs and medicines. These can increase your chances of quitting for good.  When should you call for help?   Call 911  anytime you think you may need emergency care. For example, call if:    You passed out (lost consciousness).     You have a seizure.     You have severe vaginal bleeding.     You have severe pain in your belly or pelvis that doesn't get better between contractions.     You have had fluid gushing or leaking from your vagina and you know or think the umbilical cord is bulging into your vagina. If this happens,  "immediately get down on your knees so your rear end (buttocks) is higher than your head. This will decrease the pressure on the cord until help arrives.   Call your doctor now or seek immediate medical care if:    You have signs of preeclampsia, such as:  Sudden swelling of your face, hands, or feet.  New vision problems (such as dimness, blurring, or seeing spots).  A severe headache.     You have any vaginal bleeding.     You have belly pain or cramping.     You have a fever.     You have had regular contractions (with or without pain) for an hour. This means that you have 6 or more within 1 hour after you change your position and drink fluids.     You have a sudden release of fluid from the vagina.     You have low back pain or pelvic pressure that does not go away.     You notice that your baby has stopped moving or is moving much less than normal.   Watch closely for changes in your health, and be sure to contact your doctor if you have any problems.  Where can you learn more?  Go to https://www.[employer].net/patiented  Enter Q400 in the search box to learn more about \" Labor: Care Instructions.\"  Current as of: July 10, 2023               Content Version: 14.0    7788-2006 [employer].   Care instructions adapted under license by your healthcare professional. If you have questions about a medical condition or this instruction, always ask your healthcare professional. [employer] disclaims any warranty or liability for your use of this information.      "

## 2024-05-30 NOTE — PROGRESS NOTES
Patient reports that she has been feeling pretty well.  She notes good fetal movement, denies LOF or bleeding.  Discussed symptoms of  labor today, who and when to call.  She has been experiencing a bit of increased anxiety lately.  She continues to follow with a psychiatric NP, and has an appointment coming up.  We spent some time discussing safety of SSRIs in pregnancy, along with PRN medications for anxiety.  My preference for her would be to restart a daily controller medication instead of only using a PRN for anxiety.  She will discuss with her psychiatric provider.  We also spent some time discussing weight gain during pregnancy.  She will try to eat a healthy diet and get some regular exercise like walking.  We will keep an eye on weight gain.  She will come back for her 1 hour glucose test as she cannot complete this at her next visit.  Reviewed her fetal survey ultrasound today also, which was normal.  EFW was 73%ile.  Concerns today: increased anxiety  No nausea/vomiting. No heartburn.  No vaginal bleeding, no contractions/severe cramping, no leakage of fluid.  No vaginal discharge. No dysuria  No headache, vision changes, lower extremity swelling, upper abdominal pain, chest pain, shortness of breath.   Discussed PTL, PROM, and when to call or come in.  Reportable signs and symptoms discussed.      Vale Pringle MD

## 2024-06-05 ENCOUNTER — LAB (OUTPATIENT)
Dept: LAB | Facility: CLINIC | Age: 29
End: 2024-06-05
Payer: COMMERCIAL

## 2024-06-05 DIAGNOSIS — Z34.02 ENCOUNTER FOR SUPERVISION OF NORMAL FIRST PREGNANCY IN SECOND TRIMESTER: ICD-10-CM

## 2024-06-05 LAB
GLUCOSE 1H P 50 G GLC PO SERPL-MCNC: 102 MG/DL (ref 70–129)
HGB BLD-MCNC: 11.3 G/DL (ref 11.7–15.7)

## 2024-06-05 PROCEDURE — 36415 COLL VENOUS BLD VENIPUNCTURE: CPT

## 2024-06-05 PROCEDURE — 82950 GLUCOSE TEST: CPT

## 2024-06-05 PROCEDURE — 86780 TREPONEMA PALLIDUM: CPT

## 2024-06-06 DIAGNOSIS — O21.9 VOMITING OR NAUSEA OF PREGNANCY: ICD-10-CM

## 2024-06-06 LAB — T PALLIDUM AB SER QL: NONREACTIVE

## 2024-06-07 RX ORDER — ONDANSETRON 4 MG/1
4 TABLET, FILM COATED ORAL EVERY 8 HOURS PRN
Qty: 21 TABLET | Refills: 1 | Status: SHIPPED | OUTPATIENT
Start: 2024-06-07 | End: 2024-08-01

## 2024-06-27 ENCOUNTER — PRENATAL OFFICE VISIT (OUTPATIENT)
Dept: FAMILY MEDICINE | Facility: CLINIC | Age: 29
End: 2024-06-27
Payer: COMMERCIAL

## 2024-06-27 VITALS
TEMPERATURE: 98.2 F | HEART RATE: 102 BPM | DIASTOLIC BLOOD PRESSURE: 70 MMHG | HEIGHT: 61 IN | OXYGEN SATURATION: 96 % | SYSTOLIC BLOOD PRESSURE: 110 MMHG | RESPIRATION RATE: 20 BRPM | WEIGHT: 174.2 LBS | BODY MASS INDEX: 32.89 KG/M2

## 2024-06-27 DIAGNOSIS — O26.892 HEARTBURN DURING PREGNANCY IN SECOND TRIMESTER: ICD-10-CM

## 2024-06-27 DIAGNOSIS — R12 HEARTBURN DURING PREGNANCY IN SECOND TRIMESTER: ICD-10-CM

## 2024-06-27 DIAGNOSIS — Z34.02 ENCOUNTER FOR SUPERVISION OF NORMAL FIRST PREGNANCY IN SECOND TRIMESTER: Primary | ICD-10-CM

## 2024-06-27 DIAGNOSIS — F33.0 MILD EPISODE OF RECURRENT MAJOR DEPRESSIVE DISORDER (H): ICD-10-CM

## 2024-06-27 PROCEDURE — 99207 PR PRENATAL VISIT: CPT | Performed by: FAMILY MEDICINE

## 2024-06-27 RX ORDER — FAMOTIDINE 20 MG/1
20 TABLET, FILM COATED ORAL 2 TIMES DAILY
Qty: 60 TABLET | Refills: 3 | Status: SHIPPED | OUTPATIENT
Start: 2024-06-27

## 2024-06-27 ASSESSMENT — ASTHMA QUESTIONNAIRES
QUESTION_4 LAST FOUR WEEKS HOW OFTEN HAVE YOU USED YOUR RESCUE INHALER OR NEBULIZER MEDICATION (SUCH AS ALBUTEROL): NOT AT ALL
QUESTION_5 LAST FOUR WEEKS HOW WOULD YOU RATE YOUR ASTHMA CONTROL: COMPLETELY CONTROLLED
QUESTION_1 LAST FOUR WEEKS HOW MUCH OF THE TIME DID YOUR ASTHMA KEEP YOU FROM GETTING AS MUCH DONE AT WORK, SCHOOL OR AT HOME: NONE OF THE TIME
QUESTION_2 LAST FOUR WEEKS HOW OFTEN HAVE YOU HAD SHORTNESS OF BREATH: NOT AT ALL
QUESTION_3 LAST FOUR WEEKS HOW OFTEN DID YOUR ASTHMA SYMPTOMS (WHEEZING, COUGHING, SHORTNESS OF BREATH, CHEST TIGHTNESS OR PAIN) WAKE YOU UP AT NIGHT OR EARLIER THAN USUAL IN THE MORNING: NOT AT ALL
ACT_TOTALSCORE: 25
ACT_TOTALSCORE: 25

## 2024-06-27 NOTE — PROGRESS NOTES
Patient reports that overall she has been feeling well.  She continues to meet regularly with her psychiatrist, and was recommended to restart sertraline.  She has not yet picked this up from the pharmacy.  Her PHQ-9 score remains in the mild range.  We briefly discussed risks versus benefits of SSRI use in pregnancy including transient tachypnea of the .  She verbalizes understanding.  She passed her 1 hour GCT.  She denies leakage of fluid or bleeding, reports good fetal movement.  She has her breast pump.  Discussed Tdap vaccine next visit.    Concerns: None  Doing well.  No concerns today.  No vaginal bleeding, no contractions, no leakage of fluid  No nausea/vomiting. No heartburn  No vaginal discharge. No dysuria.   No headache, vision changes, lower extremity swelling, upper abdominal pain, chest pain, shortness of breath  Reportable signs and symptoms discussed.  Tdap planned next visit  Discussed PTL, PROM, and when to call or come in.  Normal anatomy ultrasound.  RTC 3 weeks.  GTT and labs reviewed.      Vale Pringle MD

## 2024-06-27 NOTE — PATIENT INSTRUCTIONS
Weeks 26 to 30 of Your Pregnancy: Care Instructions  You're starting your last trimester. You'll probably feel your baby moving around more. Your back may ache as your body gets used to your baby's size and length. Take care of yourself, and pay attention to what your body needs.    Talk to your doctor about getting the Tdap shot. It will help protect your  against whooping cough (pertussis). Also ask your doctor about flu and COVID-19 shots if you haven't had them yet. If your blood type is Rh negative, you may be given a shot of Rh immune globulin (such as RhoGAM). It can help prevent problems for your baby.    You may have Andrew-Garcia contractions. They are single or several strong contractions without a pattern. These are practice contractions but not the start of labor.  Be kind to yourself.     Take breaks when you're tired.  Change positions often. Don't sit for too long or stand for too long.  At work, rest during breaks if you can. If you don't get breaks, talk to your doctor about writing a letter to your employer to request them.  Avoid fumes, chemicals, and tobacco smoke.  Be sexual if you want to.     You may be interested in sex, or you may not. Everyone is different.  Sex is okay unless your doctor tells you not to.  Your belly can make it hard to find good positions for sex. Antler and explore.  Watch for signs of  labor.    These signs include:   Menstrual-like cramps. Or you may have pain or pressure in your pelvis that happens in a pattern.  About 6 or more contractions in an hour (even after rest and a glass of water).  A low, dull backache that doesn't go away when you change positions.  An increase or change in vaginal discharge.  Light vaginal bleeding or spotting.  Your water breaking.  Know what to do if you think you are having contractions.     Drink 1 or 2 glasses of water.  Lie down on your left side for at least an hour.  While on your side, feel the top of your  "belly to see if it's tight.  Write down your contractions for an hour. Time how long it is from the start of one contraction to the start of the next.  Call your doctor if you have regular contractions.  Follow-up care is a key part of your treatment and safety. Be sure to make and go to all appointments, and call your doctor if you are having problems. It's also a good idea to know your test results and keep a list of the medicines you take.  Where can you learn more?  Go to https://www.[employer].net/patiented  Enter S999 in the search box to learn more about \"Weeks 26 to 30 of Your Pregnancy: Care Instructions.\"  Current as of: July 10, 2023               Content Version: 14.0    4386-2209 [employer].   Care instructions adapted under license by your healthcare professional. If you have questions about a medical condition or this instruction, always ask your healthcare professional. [employer] disclaims any warranty or liability for your use of this information.      Counting Your Baby's Kicks: Care Instructions  Overview     Counting your baby's kicks is one way your doctor can tell that your baby is healthy. You will probably feel your baby move for the first time between 16 and 22 weeks. The movement may feel like flutters rather than kicks. Your baby may move more at certain times of the day. When you are active, you may notice less kicking than when you are resting. At your prenatal visits, your doctor will ask whether the baby is active.  In your last trimester, your doctor may ask you to count the number of times you feel your baby move.  Follow-up care is a key part of your treatment and safety. Be sure to make and go to all appointments, and call your doctor if you are having problems. It's also a good idea to know your test results and keep a list of the medicines you take.  How do you count fetal kicks?  A common method of checking your baby's movement is to note the length " "of time it takes to count 10 movements (such as kicks, flutters, or rolls).  Pick your baby's most active time of day to count. This may be any time from morning to evening.  If you don't feel 10 movements in an hour, have something to eat or drink and count for another hour. If you don't feel at least 10 movements in the 2-hour period, call your doctor.  Do not use an at-home Doppler heart monitor in place of counting fetal movements.  When should you call for help?   Call your doctor now or seek immediate medical care if:    You feel fewer than 10 movements in a 2-hour period.     You noticed that your baby has stopped moving or is moving less than normal.   Watch closely for changes in your health, and be sure to contact your doctor if you have any problems.  Where can you learn more?  Go to https://www.[employer].net/patiented  Enter U048 in the search box to learn more about \"Counting Your Baby's Kicks: Care Instructions.\"  Current as of: July 10, 2023               Content Version: 14.0    3144-1551 [employer].   Care instructions adapted under license by your healthcare professional. If you have questions about a medical condition or this instruction, always ask your healthcare professional. [employer] disclaims any warranty or liability for your use of this information.        "

## 2024-07-18 ENCOUNTER — PRENATAL OFFICE VISIT (OUTPATIENT)
Dept: FAMILY MEDICINE | Facility: CLINIC | Age: 29
End: 2024-07-18
Payer: COMMERCIAL

## 2024-07-18 VITALS
HEIGHT: 61 IN | SYSTOLIC BLOOD PRESSURE: 103 MMHG | OXYGEN SATURATION: 97 % | HEART RATE: 93 BPM | WEIGHT: 177.6 LBS | BODY MASS INDEX: 33.53 KG/M2 | RESPIRATION RATE: 20 BRPM | DIASTOLIC BLOOD PRESSURE: 69 MMHG | TEMPERATURE: 98.8 F

## 2024-07-18 DIAGNOSIS — Z34.03 ENCOUNTER FOR SUPERVISION OF NORMAL FIRST PREGNANCY IN THIRD TRIMESTER: Primary | ICD-10-CM

## 2024-07-18 PROCEDURE — 90715 TDAP VACCINE 7 YRS/> IM: CPT | Performed by: FAMILY MEDICINE

## 2024-07-18 PROCEDURE — 90471 IMMUNIZATION ADMIN: CPT | Performed by: FAMILY MEDICINE

## 2024-07-18 PROCEDURE — 99207 PR PRENATAL VISIT: CPT | Performed by: FAMILY MEDICINE

## 2024-07-18 ASSESSMENT — ASTHMA QUESTIONNAIRES
ACT_TOTALSCORE: 25
ACT_TOTALSCORE: 25
QUESTION_3 LAST FOUR WEEKS HOW OFTEN DID YOUR ASTHMA SYMPTOMS (WHEEZING, COUGHING, SHORTNESS OF BREATH, CHEST TIGHTNESS OR PAIN) WAKE YOU UP AT NIGHT OR EARLIER THAN USUAL IN THE MORNING: NOT AT ALL
QUESTION_1 LAST FOUR WEEKS HOW MUCH OF THE TIME DID YOUR ASTHMA KEEP YOU FROM GETTING AS MUCH DONE AT WORK, SCHOOL OR AT HOME: NONE OF THE TIME
QUESTION_4 LAST FOUR WEEKS HOW OFTEN HAVE YOU USED YOUR RESCUE INHALER OR NEBULIZER MEDICATION (SUCH AS ALBUTEROL): NOT AT ALL
QUESTION_5 LAST FOUR WEEKS HOW WOULD YOU RATE YOUR ASTHMA CONTROL: COMPLETELY CONTROLLED
QUESTION_2 LAST FOUR WEEKS HOW OFTEN HAVE YOU HAD SHORTNESS OF BREATH: NOT AT ALL

## 2024-07-18 ASSESSMENT — PATIENT HEALTH QUESTIONNAIRE - PHQ9
10. IF YOU CHECKED OFF ANY PROBLEMS, HOW DIFFICULT HAVE THESE PROBLEMS MADE IT FOR YOU TO DO YOUR WORK, TAKE CARE OF THINGS AT HOME, OR GET ALONG WITH OTHER PEOPLE: SOMEWHAT DIFFICULT
SUM OF ALL RESPONSES TO PHQ QUESTIONS 1-9: 9
SUM OF ALL RESPONSES TO PHQ QUESTIONS 1-9: 9

## 2024-07-18 NOTE — PATIENT INSTRUCTIONS
"Weeks 30 to 32 of Your Pregnancy: Care Instructions  Your baby is growing more every day. Its eyes can open and close, and it may have hair on its head. Your baby may sleep 20 to 45 minutes at a time and is more active at certain times.    You should feel your baby move several times every day. Your baby now turns less and kicks more.    This is a good time to tour your hospital or birthing center. You may also want to find childcare if needed.    To ease heartburn    Avoid foods that make your symptoms worse, such as chocolate, spicy foods, and caffeine.  Avoid bending over or lying down after meals.  Do not eat for 2 hours before bedtime.  Take antacids like Tums, but don't take ones that have sodium bicarbonate, magnesium trisilicate, or aspirin.    To care for large, swollen veins (varicose veins)    Try to avoid standing for long periods of time.  Sit with your feet propped up.  Wear support hose.  Get some exercise every day, like walking or swimming.  Counting your baby's kicks  Your doctor may ask you to count your baby's movements, such as kicks, flutters, or rolls.    Find a quiet place, and get comfortable. Write down your start time. Count your baby's movements (except hiccups). When your baby has moved 10 times, you can stop counting. Write down how many minutes it took.    If an hour goes by and you don't feel 10 movements, have something to eat or drink. Count for another hour. If you don't feel at least 10 movements in the 2-hour period, call your doctor.  Follow-up care is a key part of your treatment and safety. Be sure to make and go to all appointments, and call your doctor if you are having problems. It's also a good idea to know your test results and keep a list of the medicines you take.  Where can you learn more?  Go to https://www.[email or web address] Holdingswise.net/patiented  Enter X471 in the search box to learn more about \"Weeks 30 to 32 of Your Pregnancy: Care Instructions.\"  Current as of: July 10, " "2023               Content Version: 14.0    4175-4850 [employer].   Care instructions adapted under license by your healthcare professional. If you have questions about a medical condition or this instruction, always ask your healthcare professional. [employer] disclaims any warranty or liability for your use of this information.      Learning About Birth Control After Childbirth  What is birth control?  Birth control is any method used to prevent pregnancy. If you have vaginal sex without birth control, you could get pregnant--even if you haven't started having periods again. You're less likely to get pregnant while breastfeeding, but it's still possible. Finding birth control that works for you can help avoid an unplanned pregnancy.  There are many kinds of birth control. Each has pros and cons. Find what works for you. Talk to your doctor if you've just given birth or are breastfeeding.    Long-acting reversible contraception (LARC). These are placed inside your body by a doctor. They can prevent pregnancy for years.  Examples include:  An implant (hormonal).  Copper intrauterine device (IUD).  Hormonal IUDs.    Short-acting hormonal methods. These release hormones. Examples include:  Combination birth control pills (\"the pill\").  Skin patches.  A vaginal ring.  A shot.  Mini-pills. Choose progestin-only options soon after giving birth.    Barrier methods. Use these every time you have vaginal sex.  Examples include:  External (male) condoms.  Internal (female) condoms.  Diaphragms.  Cervical caps.  Sponges.    Spermicides. These kill sperm or stop sperm from moving. They can be gels, creams, foams, films, or tablets. Use them before vaginal sex.  Examples include:  Nonoxynol-9.  pH regulator gel.    Permanent birth control (sterilization). This can be an option if you're sure that you don't want to get pregnant later.  Examples include:  Vasectomy.  Having tubes tied (tubal " "ligation).    Emergency contraception. This is a backup method. Use it if you didn't use birth control or your birth control method failed.  Examples include:  Copper and hormonal IUDs.  Emergency contraceptive pills.    Fertility awareness. You'll learn when you are most likely to become pregnant (are fertile). You can avoid vaginal sex at that time.  It's also called:  Natural family planning.  The rhythm method.    Breastfeeding. This is most effective when all of these are true:  Your baby is younger than 6 months old.  You're breastfeeding and not bottle-feeding at all.  You aren't having periods.  Follow-up care is a key part of your treatment and safety. Be sure to make and go to all appointments, and call your doctor if you are having problems. It's also a good idea to know your test results and keep a list of the medicines you take.  Where can you learn more?  Go to https://www.[employer].net/patiented  Enter X408 in the search box to learn more about \"Learning About Birth Control After Childbirth.\"  Current as of: July 10, 2023               Content Version: 14.0    2950-6155 [employer].   Care instructions adapted under license by your healthcare professional. If you have questions about a medical condition or this instruction, always ask your healthcare professional. [employer] disclaims any warranty or liability for your use of this information.      "

## 2024-07-18 NOTE — PROGRESS NOTES
Patient reports that she feels well overall.  We discussed weight gain thus far this pregnancy, discussed a healthy, vegetable-rich diet and continuing to get regular exercise.  Mood has been stable.  Patient denies leakage of fluid or bleeding, reports good fetal movement.  She denies RUQ pain, headaches or blurred vision.  Tdap discussed and given today.  Concerns: None  Doing well.  No concerns today.  No vaginal bleeding, LOF.  No contractions.  Discussed kick counts and fetal movement.  Reportable signs and symptoms discussed.  Discussed PTL, PROM, and when to call or come in.  RTC 2 weeks.      Prior to immunization administration, verified patients identity using patient s name and date of birth. Please see Immunization Activity for additional information.     Screening Questionnaire for Adult Immunization    Are you sick today?   No   Do you have allergies to medications, food, a vaccine component or latex?   Yes   Have you ever had a serious reaction after receiving a vaccination?   No   Do you have a long-term health problem with heart, lung, kidney, or metabolic disease (e.g., diabetes), asthma, a blood disorder, no spleen, complement component deficiency, a cochlear implant, or a spinal fluid leak?  Are you on long-term aspirin therapy?   No   Do you have cancer, leukemia, HIV/AIDS, or any other immune system problem?   No   Do you have a parent, brother, or sister with an immune system problem?   No   In the past 3 months, have you taken medications that affect  your immune system, such as prednisone, other steroids, or anticancer drugs; drugs for the treatment of rheumatoid arthritis, Crohn s disease, or psoriasis; or have you had radiation treatments?   No   Have you had a seizure, or a brain or other nervous system problem?   No   During the past year, have you received a transfusion of blood or blood    products, or been given immune (gamma) globulin or antiviral drug?   No   For women: Are you  pregnant or is there a chance you could become       pregnant during the next month?   Yes   Have you received any vaccinations in the past 4 weeks?   No   Immunization questionnaire answers were all negative.  Patient instructed to remain in clinic for 15 minutes afterwards, and to report any adverse reactions. Screening performed by Roxanne Biggs on 7/18/2024 at 3:00 PM.      Vale Pringle MD

## 2024-07-31 ASSESSMENT — ASTHMA QUESTIONNAIRES
QUESTION_1 LAST FOUR WEEKS HOW MUCH OF THE TIME DID YOUR ASTHMA KEEP YOU FROM GETTING AS MUCH DONE AT WORK, SCHOOL OR AT HOME: NONE OF THE TIME
ACT_TOTALSCORE: 23
ACT_TOTALSCORE: 23
QUESTION_4 LAST FOUR WEEKS HOW OFTEN HAVE YOU USED YOUR RESCUE INHALER OR NEBULIZER MEDICATION (SUCH AS ALBUTEROL): NOT AT ALL
QUESTION_5 LAST FOUR WEEKS HOW WOULD YOU RATE YOUR ASTHMA CONTROL: COMPLETELY CONTROLLED
QUESTION_3 LAST FOUR WEEKS HOW OFTEN DID YOUR ASTHMA SYMPTOMS (WHEEZING, COUGHING, SHORTNESS OF BREATH, CHEST TIGHTNESS OR PAIN) WAKE YOU UP AT NIGHT OR EARLIER THAN USUAL IN THE MORNING: NOT AT ALL
QUESTION_2 LAST FOUR WEEKS HOW OFTEN HAVE YOU HAD SHORTNESS OF BREATH: THREE TO SIX TIMES A WEEK

## 2024-07-31 ASSESSMENT — PATIENT HEALTH QUESTIONNAIRE - PHQ9
10. IF YOU CHECKED OFF ANY PROBLEMS, HOW DIFFICULT HAVE THESE PROBLEMS MADE IT FOR YOU TO DO YOUR WORK, TAKE CARE OF THINGS AT HOME, OR GET ALONG WITH OTHER PEOPLE: SOMEWHAT DIFFICULT
SUM OF ALL RESPONSES TO PHQ QUESTIONS 1-9: 11
SUM OF ALL RESPONSES TO PHQ QUESTIONS 1-9: 11

## 2024-08-01 ENCOUNTER — PRENATAL OFFICE VISIT (OUTPATIENT)
Dept: FAMILY MEDICINE | Facility: CLINIC | Age: 29
End: 2024-08-01
Payer: COMMERCIAL

## 2024-08-01 VITALS
OXYGEN SATURATION: 99 % | DIASTOLIC BLOOD PRESSURE: 71 MMHG | HEART RATE: 89 BPM | BODY MASS INDEX: 33.57 KG/M2 | RESPIRATION RATE: 20 BRPM | SYSTOLIC BLOOD PRESSURE: 110 MMHG | WEIGHT: 177.8 LBS | HEIGHT: 61 IN | TEMPERATURE: 98.4 F

## 2024-08-01 DIAGNOSIS — O21.9 VOMITING OR NAUSEA OF PREGNANCY: ICD-10-CM

## 2024-08-01 DIAGNOSIS — Z34.03 ENCOUNTER FOR SUPERVISION OF NORMAL FIRST PREGNANCY IN THIRD TRIMESTER: Primary | ICD-10-CM

## 2024-08-01 PROCEDURE — 99207 PR PRENATAL VISIT: CPT | Performed by: FAMILY MEDICINE

## 2024-08-01 RX ORDER — ONDANSETRON 4 MG/1
4 TABLET, FILM COATED ORAL EVERY 8 HOURS PRN
Qty: 30 TABLET | Refills: 1 | Status: SHIPPED | OUTPATIENT
Start: 2024-08-01

## 2024-08-11 NOTE — PATIENT INSTRUCTIONS
"Weeks 32 to 34 of Your Pregnancy: Care Instructions    Decide whether you want to bank or donate your baby's umbilical cord blood. If you want to save this blood, you have to arrange for it ahead of time.   Decide about circumcision. Personal, Tenriism, or cultural beliefs may play a role in your decision. You get to decide what you want for your baby.         Learn how to ease hemorrhoids.   Get more liquids, fruits, vegetables, and fiber in your diet.  Avoid sitting for too long.  Clean yourself with moist toilet paper. Or try witch hazel pads.  Try ice packs or warm sitz baths for discomfort.  Use hydrocortisone cream for pain or itching.  Ask your doctor about stool softeners.        Consider the benefits of breastfeeding.   It reduces your baby's risk of sudden infant death syndrome (SIDS).   babies are less likely to get certain infections. And they're less likely to be obese or get diabetes later in life.  It can lower your risk of breast and ovarian cancers and osteoporosis.  It saves you money.  Follow-up care is a key part of your treatment and safety. Be sure to make and go to all appointments, and call your doctor if you are having problems. It's also a good idea to know your test results and keep a list of the medicines you take.  Where can you learn more?  Go to https://www.Talkable.net/patiented  Enter X711 in the search box to learn more about \"Weeks 32 to 34 of Your Pregnancy: Care Instructions.\"  Current as of: July 10, 2023  Content Version: 14.1 2006-2024 [employer].   Care instructions adapted under license by your healthcare professional. If you have questions about a medical condition or this instruction, always ask your healthcare professional. [employer] disclaims any warranty or liability for your use of this information.    You have been provided the Any Day Now: Early Labor at Home document.    Additional copies can be found here:  " [email or web address]  You have been provided the What I'd Wish I'd Known About Giving Birth document.    Additional copies can be found here:  [email or web address]

## 2024-08-11 NOTE — PROGRESS NOTES
Patient notes that she has been feeling well.  She denies LOF or bleeding, reports good fetal movement.  She denies RUQ pain, headaches or blurred vision.  She notes that her mood has been good, and her PHQ-9 score of 11 reflects symptoms like fatigue that are associated with her pregnancy.  She continues to follow with Psychiatry.    Concerns: None  Doing well.  No concerns today.  No vaginal bleeding, LOF.  No contractions.  Discussed kick counts and fetal movement.  Reportable signs and symptoms discussed.  Discussed PTL, PROM, and when to call or come in.  RTC 2 weeks.    Vale Pringle MD

## 2024-08-15 ENCOUNTER — MYC MEDICAL ADVICE (OUTPATIENT)
Dept: FAMILY MEDICINE | Facility: CLINIC | Age: 29
End: 2024-08-15

## 2024-08-15 ENCOUNTER — PRENATAL OFFICE VISIT (OUTPATIENT)
Dept: FAMILY MEDICINE | Facility: CLINIC | Age: 29
End: 2024-08-15
Payer: COMMERCIAL

## 2024-08-15 VITALS
OXYGEN SATURATION: 93 % | DIASTOLIC BLOOD PRESSURE: 66 MMHG | HEIGHT: 61 IN | HEART RATE: 96 BPM | TEMPERATURE: 98.6 F | BODY MASS INDEX: 34.25 KG/M2 | WEIGHT: 181.4 LBS | SYSTOLIC BLOOD PRESSURE: 110 MMHG | RESPIRATION RATE: 20 BRPM

## 2024-08-15 DIAGNOSIS — Z34.03 ENCOUNTER FOR SUPERVISION OF NORMAL FIRST PREGNANCY IN THIRD TRIMESTER: Primary | ICD-10-CM

## 2024-08-15 PROCEDURE — 99207 PR PRENATAL VISIT: CPT | Performed by: FAMILY MEDICINE

## 2024-08-16 NOTE — PATIENT INSTRUCTIONS
If you feel that you are in labor, call directly to [facility] at [phone].    Weeks 34 to 36 of Your Pregnancy: Care Instructions  Your belly has grown quite large. It's almost time to give birth! Your baby's lungs are almost ready to breathe air. The skull bones are firm enough to protect your baby's head. But they're soft enough to move down through the birth canal.    You might be wondering what to expect during labor. Because each birth is different, there's no way to know exactly what childbirth will be like for you. Talk to your doctor or midwife about any concerns you have.    You'll probably have a test for group B streptococcus (GBS). GBS is bacteria that can live in the vagina and rectum. GBS can make your baby sick after birth. If you test positive, you'll get antibiotics during labor.    Choose what type of pain relief you would like during labor.  You can choose from a few types, including medicine and non-medicine options. You may want to use several types of pain relief.     Know how medicines can help with pain during labor.  Some medicines lower anxiety and help with some of the pain. Others make your lower body numb so that you will feel less pain.     Tell your doctor about your pain medicine choice.  Do this before you start labor or very early in your labor. You may be able to change your mind during labor.     Learn about the stages of labor.    The first stage includes the early (latent) and active phases of labor. Contractions start in early labor. During active labor, contractions get stronger, last longer, and happen more often. And the cervix opens more rapidly.  The second stage starts when you're ready to push. During this stage, your baby is born.  During the third stage, you'll usually have a few more contractions to push out the placenta.   Follow-up care is a key part of your treatment and safety. Be sure to make and go to all appointments, and call your doctor if  "you are having problems. It's also a good idea to know your test results and keep a list of the medicines you take.  Where can you learn more?  Go to https://www.[employer].net/patiented  Enter B912 in the search box to learn more about \"Weeks 34 to 36 of Your Pregnancy: Care Instructions.\"  Current as of: July 10, 2023               Content Version: 14.0    5970-0201 [employer].   Care instructions adapted under license by your healthcare professional. If you have questions about a medical condition or this instruction, always ask your healthcare professional. [employer] disclaims any warranty or liability for your use of this information.      Group B Strep During Pregnancy: Care Instructions  Overview     Group B strep infection is caused by a type of bacteria. It's a different kind of bacteria than the kind that causes strep throat.  You may have this kind of bacteria in your body. Sometimes it may cause an infection, but most of the time it doesn't make you sick or cause symptoms. But if you pass the bacteria to your baby during the birth, it can cause serious health problems for your baby.  If you have this bacteria in your body, you will get antibiotics when you are in labor. Antibiotics help prevent problems for a  baby.  After birth, doctors will watch and may test your baby. If your baby tests positive for Group B strep, your baby will get antibiotics.  If you plan to breastfeed your baby, don't worry. It will be safe to breastfeed.  Follow-up care is a key part of your treatment and safety. Be sure to make and go to all appointments, and call your doctor if you are having problems. It's also a good idea to know your test results and keep a list of the medicines you take.  How can you care for yourself at home?  If your doctor has prescribed antibiotics, take them as directed. Do not stop taking them just because you feel better. You need to take the full course of " "antibiotics.  Tell your doctor if you are allergic to any antibiotic.  If you go into labor, or your water breaks, go to the hospital. Your doctor will give you antibiotics to help protect your baby from infection.  Tell the doctors and nurses if you have an allergy to penicillin.  Tell the doctors and nurses at the hospital that you tested positive for group B strep.  When should you call for help?   Call your doctor now or seek immediate medical care if:    You have symptoms of a urinary tract infection. These may include:  Pain or burning when you urinate.  A frequent need to urinate without being able to pass much urine.  Pain in the flank, which is just below the rib cage and above the waist on either side of the back.  Blood in your urine.  A fever.     You think you are in labor or your water has broken.     You have pain in your belly or pelvis.   Watch closely for changes in your health, and be sure to contact your doctor if you have any problems.  Where can you learn more?  Go to https://www.NextPrinciples.Picturae/patiented  Enter M001 in the search box to learn more about \"Group B Strep During Pregnancy: Care Instructions.\"  Current as of: June 12, 2023               Content Version: 14.0    3414-9599 [employer].   Care instructions adapted under license by your healthcare professional. If you have questions about a medical condition or this instruction, always ask your healthcare professional. [employer] disclaims any warranty or liability for your use of this information.      Circumcision in Infants: What to Expect at Home  Your Child's Recovery  After circumcision, your baby's penis may look red and swollen. It may have petroleum jelly and gauze on it. The gauze will likely come off when your baby urinates. Follow your doctor's directions about whether to put clean gauze back on your baby's penis or to leave the gauze off. If you need to remove gauze from the penis, use warm water " to soak the gauze and gently loosen it.  The doctor may have used a Plastibell device to do the circumcision. If so, your baby will have a plastic ring around the head of the penis. The ring should fall off by itself in 10 to 12 days.  A thin, yellow film may form over the area the day after the procedure. This is part of the normal healing process. It should go away in a few days.  Your baby may seem fussy while the area heals. It may hurt for your baby to urinate. This pain often gets better in 3 or 4 days. But it may last for up to 2 weeks.  Even though your baby's penis will likely start to feel better after 3 or 4 days, it may look worse. The penis often starts to look like it's getting better after about 7 to 10 days.  This care sheet gives you a general idea about how long it will take for your child to recover. But each child recovers at a different pace. Follow the steps below to help your child get better as quickly as possible.  How can you care for your child at home?  Activity    Let your baby rest as much as possible. Sleeping will help with recovery.     You can give your baby a sponge bath the day after surgery. Ask your doctor when it is okay to give your baby a bath.   Medicines    Your doctor will tell you if and when your child can restart any medicines. The doctor will also give you instructions about your child taking any new medicines.     Your doctor may recommend giving your baby acetaminophen (Tylenol) to help with pain after the procedure. Be safe with medicines. Give your child medicines exactly as prescribed. Call your doctor if you think your child is having a problem with a medicine.     Do not give your child two or more pain medicines at the same time unless the doctor told you to. Many pain medicines have acetaminophen, which is Tylenol. Too much acetaminophen (Tylenol) can be harmful.   Circumcision care    Always wash your hands before and after touching the circumcision area.      "Gently wash your baby's penis with plain, warm water after each diaper change, and pat it dry. Do not use soap. Don't use hydrogen peroxide or alcohol. They can slow healing.     Do not try to remove the film that forms on the penis. The film will go away on its own.     Put plenty of petroleum jelly (such as Vaseline) on the circumcision area during each diaper change. This will prevent your baby's penis from sticking to the diaper while it heals.     Fasten your baby's diapers loosely so that there is less pressure on the penis while it heals.   Follow-up care is a key part of your child's treatment and safety. Be sure to make and go to all appointments, and call your doctor if your child is having problems. It's also a good idea to know your child's test results and keep a list of the medicines your child takes.  When should you call for help?   Call your doctor now or seek immediate medical care if:    Your baby has a fever over 100.4 F.     Your baby is extremely fussy or irritable, has a high-pitched cry, or refuses to eat.     Your baby does not have a wet diaper within 12 hours after the circumcision.     You find a spot of bleeding larger than a 2-inch [place] from the incision.     Your baby has signs of infection. Signs may include severe swelling; redness; a red streak on the shaft of the penis; or a thick, yellow discharge.   Watch closely for changes in your child's health, and be sure to contact your doctor if:    A Plastibell device was used for the circumcision and the ring has not fallen off after 10 to 12 days.   Where can you learn more?  Go to https://www.[employer].net/patiented  Enter S255 in the search box to learn more about \"Circumcision in Infants: What to Expect at Home.\"  Current as of: October 24, 2023               Content Version: 14.0    3969-7290 [employer].   Care instructions adapted under license by your healthcare professional. If you have questions about a medical " condition or this instruction, always ask your healthcare professional. [employer] disclaims any warranty or liability for your use of this information.

## 2024-08-16 NOTE — PROGRESS NOTES
Patient reports that she continues to feel well.  She notes good fetal movement, denies LOF or bleeding.  Reviewed who and when to call if she feels that she is in labor.  Delivery preferences discussed today.  She denies RUQ pain, headaches or blurred vision.  Mood has been stable.  Concerns: None  Doing well.  No concerns today.  No vaginal bleeding, LOF.  No contractions.  Cervix check next visit.  Discussed kick counts and fetal movement.  Reportable signs and symptoms discussed.  Discussed PTL, PROM, and when to call or come in.  RTC 2 weeks.    Vale Pringle MD

## 2024-08-22 ENCOUNTER — APPOINTMENT (OUTPATIENT)
Dept: ULTRASOUND IMAGING | Facility: HOSPITAL | Age: 29
End: 2024-08-22
Attending: FAMILY MEDICINE
Payer: COMMERCIAL

## 2024-08-22 ENCOUNTER — HOSPITAL ENCOUNTER (OUTPATIENT)
Facility: HOSPITAL | Age: 29
Discharge: HOME | End: 2024-08-22
Attending: FAMILY MEDICINE | Admitting: FAMILY MEDICINE
Payer: COMMERCIAL

## 2024-08-22 ENCOUNTER — NURSE TRIAGE (OUTPATIENT)
Dept: OBGYN | Facility: HOSPITAL | Age: 29
End: 2024-08-22
Payer: COMMERCIAL

## 2024-08-22 ENCOUNTER — HOSPITAL ENCOUNTER (OUTPATIENT)
Facility: HOSPITAL | Age: 29
End: 2024-08-22
Admitting: FAMILY MEDICINE
Payer: COMMERCIAL

## 2024-08-22 VITALS
RESPIRATION RATE: 18 BRPM | HEIGHT: 60 IN | SYSTOLIC BLOOD PRESSURE: 117 MMHG | HEART RATE: 96 BPM | DIASTOLIC BLOOD PRESSURE: 73 MMHG | BODY MASS INDEX: 35.53 KG/M2 | WEIGHT: 181 LBS | TEMPERATURE: 98.2 F

## 2024-08-22 DIAGNOSIS — Z36.89 ENCOUNTER FOR TRIAGE IN PREGNANT PATIENT: Primary | ICD-10-CM

## 2024-08-22 DIAGNOSIS — R10.9 FLANK PAIN IN PREGNANT PATIENT: ICD-10-CM

## 2024-08-22 DIAGNOSIS — O26.899 FLANK PAIN IN PREGNANT PATIENT: ICD-10-CM

## 2024-08-22 LAB
ALBUMIN UR-MCNC: NEGATIVE MG/DL
APPEARANCE UR: CLEAR
BACTERIA #/AREA URNS HPF: ABNORMAL /HPF
BILIRUB UR QL STRIP: NEGATIVE
CLUE CELLS: ABNORMAL
COLOR UR AUTO: ABNORMAL
GLUCOSE UR STRIP-MCNC: NEGATIVE MG/DL
HGB UR QL STRIP: NEGATIVE
KETONES UR STRIP-MCNC: NEGATIVE MG/DL
LEUKOCYTE ESTERASE UR QL STRIP: ABNORMAL
MUCOUS THREADS #/AREA URNS LPF: PRESENT /LPF
NITRATE UR QL: NEGATIVE
PH UR STRIP: 7 [PH] (ref 5–7)
RBC URINE: 1 /HPF
SP GR UR STRIP: 1.01 (ref 1–1.03)
SQUAMOUS EPITHELIAL: 6 /HPF
TRICHOMONAS, WET PREP: ABNORMAL
UROBILINOGEN UR STRIP-MCNC: <2 MG/DL
WBC URINE: 20 /HPF
WBC'S/HIGH POWER FIELD, WET PREP: ABNORMAL
YEAST, WET PREP: ABNORMAL

## 2024-08-22 PROCEDURE — 250N000013 HC RX MED GY IP 250 OP 250 PS 637: Performed by: FAMILY MEDICINE

## 2024-08-22 PROCEDURE — 87210 SMEAR WET MOUNT SALINE/INK: CPT | Performed by: FAMILY MEDICINE

## 2024-08-22 PROCEDURE — 93976 VASCULAR STUDY: CPT

## 2024-08-22 PROCEDURE — 87086 URINE CULTURE/COLONY COUNT: CPT | Performed by: FAMILY MEDICINE

## 2024-08-22 PROCEDURE — G0463 HOSPITAL OUTPT CLINIC VISIT: HCPCS | Mod: 25

## 2024-08-22 PROCEDURE — 81001 URINALYSIS AUTO W/SCOPE: CPT | Performed by: FAMILY MEDICINE

## 2024-08-22 RX ORDER — LIDOCAINE 40 MG/G
CREAM TOPICAL
Status: DISCONTINUED | OUTPATIENT
Start: 2024-08-22 | End: 2024-08-22 | Stop reason: HOSPADM

## 2024-08-22 RX ORDER — ACETAMINOPHEN 325 MG/1
650 TABLET ORAL ONCE
Status: COMPLETED | OUTPATIENT
Start: 2024-08-22 | End: 2024-08-22

## 2024-08-22 RX ADMIN — ACETAMINOPHEN 650 MG: 325 TABLET ORAL at 17:28

## 2024-08-22 ASSESSMENT — ACTIVITIES OF DAILY LIVING (ADL)
ADLS_ACUITY_SCORE: 35
ADLS_ACUITY_SCORE: 35
ADLS_ACUITY_SCORE: 20
ADLS_ACUITY_SCORE: 35

## 2024-08-22 NOTE — TELEPHONE ENCOUNTER
Spoke with [facility] they will see patient. Called back patient to let her know to go to [facility]      Charlene Smith RN

## 2024-08-22 NOTE — TELEPHONE ENCOUNTER
I think given her gestation and her pain, I would have her evaluated at maternity.      Questions in future would be is this flank pain or abdominal pain, where in the abdomen, lower, mid quadrant?  Does it radiate to the back or is the back pain separate?  Any pain or burning with urination?      Please call ahead to [facility] to make sure they can take her.  Number to [facility] is [phone].

## 2024-08-22 NOTE — PROGRESS NOTES
Pt presented to [facility] c/o abdominal, back and right side pain. Monitor applied, vss, afebrile. Non tender to touch.    This RN notified Dr. Pringle orders for a UA received.

## 2024-08-22 NOTE — TELEPHONE ENCOUNTER
"Nurse Triage SBAR    Is this a 2nd Level Triage? YES, LICENSED PRACTITIONER REVIEW IS REQUIRED    Situation: right side pain for last 3 hours    Background: 35 weeks pregnant    Assessment: Patient having pain on right side. Happened last night and started again around 930am will feel \"jolt\" in abdomen that is not from kicking. Patient has some back pain     Protocol Recommended Disposition:   Go To LD Now (Or To Office With PCP Approval)    Recommendation: Please advise on disposition     Routed to provider    Does the patient meet one of the following criteria for ADS visit consideration? No    Charlene Smith RN      Reason for Disposition   MILD constant abdominal pain (e.g., doesn't interfere with normal activities) or tightening, and present > 2 hours    Answer Assessment - Initial Assessment Questions  1. LOCATION: \"Where does it hurt?\"       Right sided pain   2. RADIATION: \"Does the pain shoot anywhere else?\" (e.g., chest, back)      And \"jolt' in abdomen that is not a kick  3. ONSET: \"When did the pain begin?\" (Minutes, hours or days ago)       Yesterday then went away and not been happening since 930  4. SUDDEN: \"Gradual or sudden onset?\"      Sudden onset   5. PATTERN: \"Does the pain come and go, or has it been constant since it started?\"       Constant since this morning  6. SEVERITY: \"How bad is the pain?\" \"What does it keep you from doing?\"  (e.g., Scale 1-10; mild, moderate, or severe)    - MILD (1-3): Doesn't interfere with normal activities, abdomen soft and not tender to touch.     - MODERATE (4-7): Interferes with normal activities or awakens from sleep, abdomen tender to touch.     - SEVERE (8-10): Excruciating pain, doubled over, unable to do any normal activities.      5  7. RECURRENT SYMPTOM: \"Have you ever had this type of stomach pain before?\" If Yes, ask: \"When was the last time?\" and \"What happened that time?\"       no  8. CAUSE: \"What do you think is causing the stomach pain?      Not " "sure  9. RELIEVING/AGGRAVATING FACTORS: \"What makes it better or worse?\" (e.g., antacids, bowel movement, movement)      Tried to lay down didn't make a difference   10. FETAL MOVEMENT: \"Has the baby's movement decreased or changed significantly from normal?\"        Feels movement movements feel normal   11. OTHER SYMPTOMS: \"Do you have any other symptoms?\" (e.g., back pain, contractions, diarrhea, fever, headache, urination pain, vaginal bleeding, vaginal discharge, vomiting)        Some back pain no other symptoms  12. ALEXANDRO: \"What date are you expecting to deliver?\"        9/23/24    Protocols used: Pregnancy - Abdominal Pain Greater Than 20 Weeks EGA-A-[place]    "

## 2024-08-23 ENCOUNTER — MYC MEDICAL ADVICE (OUTPATIENT)
Dept: FAMILY MEDICINE | Facility: CLINIC | Age: 29
End: 2024-08-23
Payer: COMMERCIAL

## 2024-08-23 DIAGNOSIS — O21.9 VOMITING OR NAUSEA OF PREGNANCY: ICD-10-CM

## 2024-08-23 LAB — BACTERIA UR CULT: NORMAL

## 2024-08-23 NOTE — PROGRESS NOTES
"Patient called RN triage in clinic today complaining of various abdominal pains, one lower, one right-sided flank area, some diffuse cramping.  Advised to be evaluated at [facility].  On [facility], patient initially was found to have uterine irritability, occasional contractions.  She had a regular diet and was encouraged to hydrate.  UA suspicious for contamination without overt signs of infection.  Renal ultrasound showed likely pregnancy-related hydronephrosis, no obvious hydroureter or stones.  Upon reevaluation, patient was feeling \"crampy\" and had more regular contractions, every 2-4 minutes.  SVE at 1700 was 1/30%/-3 per RN.  Condition improved with rest, oral hydration and Tylenol and at time of discharge patient denied pain of any kind.  As she denies symptoms of vaginal discharge or dysuria, wet prep obtained but will not delay discharge for result, will await urine culture prior to empiric treatment.  Patient was reassessed at 2000 and cervix was unchanged, no contractions seen on monitor and patient was feeling well.  She was instructed in symptoms of  labor and when to call RN triage or return to [facility] again.  She was discharged home in stable condition.    Vale Pringle MD  "

## 2024-08-23 NOTE — PROGRESS NOTES
No cervical change on repeat check at 2011. Phoned Dr Pringle, orders to do wet prep and send her home, she will go over results in clinic if any.  Reactive NST, checked with Debra Riojas RN

## 2024-08-23 NOTE — PLAN OF CARE
Goal Outcome Evaluation:      Plan of Care Reviewed With: patient, spouse    Overall Patient Progress: no changeOverall Patient Progress: no change    Outcome Evaluation: Able to make needs known. VSS on room air. A&Ox4. up independent. c/o R side flank pain, renal ultrasound done. UA sent. C/o tightening, cramping in abdomen. contractions noted on strip. cervical exam 1/30/-3 with plans to reassess after 3 hours (8 pm). will update MD after SVE at 2000

## 2024-08-28 ASSESSMENT — PATIENT HEALTH QUESTIONNAIRE - PHQ9
SUM OF ALL RESPONSES TO PHQ QUESTIONS 1-9: 12
10. IF YOU CHECKED OFF ANY PROBLEMS, HOW DIFFICULT HAVE THESE PROBLEMS MADE IT FOR YOU TO DO YOUR WORK, TAKE CARE OF THINGS AT HOME, OR GET ALONG WITH OTHER PEOPLE: SOMEWHAT DIFFICULT
SUM OF ALL RESPONSES TO PHQ QUESTIONS 1-9: 12

## 2024-08-28 ASSESSMENT — ASTHMA QUESTIONNAIRES
QUESTION_5 LAST FOUR WEEKS HOW WOULD YOU RATE YOUR ASTHMA CONTROL: COMPLETELY CONTROLLED
QUESTION_4 LAST FOUR WEEKS HOW OFTEN HAVE YOU USED YOUR RESCUE INHALER OR NEBULIZER MEDICATION (SUCH AS ALBUTEROL): NOT AT ALL
ACT_TOTALSCORE: 25
QUESTION_1 LAST FOUR WEEKS HOW MUCH OF THE TIME DID YOUR ASTHMA KEEP YOU FROM GETTING AS MUCH DONE AT WORK, SCHOOL OR AT HOME: NONE OF THE TIME
QUESTION_3 LAST FOUR WEEKS HOW OFTEN DID YOUR ASTHMA SYMPTOMS (WHEEZING, COUGHING, SHORTNESS OF BREATH, CHEST TIGHTNESS OR PAIN) WAKE YOU UP AT NIGHT OR EARLIER THAN USUAL IN THE MORNING: NOT AT ALL
QUESTION_2 LAST FOUR WEEKS HOW OFTEN HAVE YOU HAD SHORTNESS OF BREATH: NOT AT ALL
ACT_TOTALSCORE: 25

## 2024-08-29 ENCOUNTER — PRENATAL OFFICE VISIT (OUTPATIENT)
Dept: FAMILY MEDICINE | Facility: CLINIC | Age: 29
End: 2024-08-29
Payer: COMMERCIAL

## 2024-08-29 VITALS
RESPIRATION RATE: 20 BRPM | HEIGHT: 61 IN | BODY MASS INDEX: 34.44 KG/M2 | HEART RATE: 101 BPM | OXYGEN SATURATION: 98 % | WEIGHT: 182.4 LBS | DIASTOLIC BLOOD PRESSURE: 72 MMHG | TEMPERATURE: 98.4 F | SYSTOLIC BLOOD PRESSURE: 115 MMHG

## 2024-08-29 DIAGNOSIS — Z34.03 ENCOUNTER FOR SUPERVISION OF NORMAL FIRST PREGNANCY IN THIRD TRIMESTER: Primary | ICD-10-CM

## 2024-08-29 PROBLEM — Z36.89 ENCOUNTER FOR TRIAGE IN PREGNANT PATIENT: Status: RESOLVED | Noted: 2024-08-22 | Resolved: 2024-08-29

## 2024-08-29 LAB — HGB BLD-MCNC: 11 G/DL (ref 11.7–15.7)

## 2024-08-29 PROCEDURE — 99207 PR PRENATAL VISIT: CPT | Performed by: FAMILY MEDICINE

## 2024-08-29 PROCEDURE — 36415 COLL VENOUS BLD VENIPUNCTURE: CPT | Performed by: FAMILY MEDICINE

## 2024-08-29 PROCEDURE — 87653 STREP B DNA AMP PROBE: CPT | Performed by: FAMILY MEDICINE

## 2024-08-29 NOTE — PROGRESS NOTES
Patient reports that overall she has been feeling well.  She denies leakage of fluid or bleeding, denies right upper quadrant pain, headaches or blurred vision.  She thinks she has had some rare contractions, nothing repetitive or increasing in intensity.  Group B strep and hemoglobin obtained today.  Hemoglobin is stable.  Reviewed who and when to call if she feels that she is in labor.    Concerns: None.  Doing well.  No concerns today.  No vaginal bleeding, LOF, contractions.  No HA, RUQ pain, N/V, visual changes.  Cervix is very posterior, 50%/-2.  Discussed signs of labor and when to call or come in.  Discussed kick counts and fetal movement.  Reportable signs and symptoms discussed.  GBS done today.  Labor precautions discussed.  RTC 1 week.  Prenatal flowsheet information is reviewed.    Vale Pringle MD

## 2024-08-29 NOTE — PATIENT INSTRUCTIONS
"Week 37 of Your Pregnancy: Care Instructions    Most babies are born between 37 and 40 weeks.   This is a good time to pack a bag to take with you to the birth. Then it will be ready to go when you are.     Learn about breastfeeding.  For example, find out about ways to hold your baby to make breastfeeding easier. And think about learning how to pump and store milk.     Know that crying is normal.  It's common for babies to cry 1 to 3 hours a day. Some cry more, and some cry less.     Learn why babies cry.  They may be hungry; have gas; need a diaper change; or feel cold, warm, tired, lonely, or tense. Sometimes they cry for unknown reasons.     Think about what will help you stay calm when your baby cries.  Taking slow, deep breaths can help. So can taking a break. It's okay to put your baby somewhere safe (like their crib) and walk away for a few minutes.     Learn about safe sleep for your baby.  Always put your baby to sleep on their back. Place them alone in a crib or bassinet with a firm, flat surface. Keep soft items like stuffed animals out of the crib.     Learn what to expect with  poop.  Your baby will have their own bowel patterns. Some babies have several bowel movements a day. Some have fewer.     Know that  babies will often have loose, yellow bowel movements.  Formula-fed babies have more formed stools. If your baby's poop looks like pellets, your baby is constipated.   Follow-up care is a key part of your treatment and safety. Be sure to make and go to all appointments, and call your doctor if you are having problems. It's also a good idea to know your test results and keep a list of the medicines you take.  Where can you learn more?  Go to https://www.Playfish.net/patiented  Enter N257 in the search box to learn more about \"Week 37 of Your Pregnancy: Care Instructions.\"  Current as of: July 10, 2023  Content Version: 14. [employer].   Care instructions " Goal Outcome Evaluation:      Infant feeding well ad verito. [facility] discontinued this shift. Parents present at care times throughout the shift. VSS.      Progress: improving      adapted under license by your healthcare professional. If you have questions about a medical condition or this instruction, always ask your healthcare professional. [employer] disclaims any warranty or liability for your use of this information.

## 2024-08-30 LAB — GP B STREP DNA SPEC QL NAA+PROBE: NEGATIVE

## 2024-09-04 ASSESSMENT — ASTHMA QUESTIONNAIRES
QUESTION_4 LAST FOUR WEEKS HOW OFTEN HAVE YOU USED YOUR RESCUE INHALER OR NEBULIZER MEDICATION (SUCH AS ALBUTEROL): NOT AT ALL
QUESTION_3 LAST FOUR WEEKS HOW OFTEN DID YOUR ASTHMA SYMPTOMS (WHEEZING, COUGHING, SHORTNESS OF BREATH, CHEST TIGHTNESS OR PAIN) WAKE YOU UP AT NIGHT OR EARLIER THAN USUAL IN THE MORNING: NOT AT ALL
QUESTION_1 LAST FOUR WEEKS HOW MUCH OF THE TIME DID YOUR ASTHMA KEEP YOU FROM GETTING AS MUCH DONE AT WORK, SCHOOL OR AT HOME: NONE OF THE TIME
ACT_TOTALSCORE: 25
QUESTION_2 LAST FOUR WEEKS HOW OFTEN HAVE YOU HAD SHORTNESS OF BREATH: NOT AT ALL
QUESTION_5 LAST FOUR WEEKS HOW WOULD YOU RATE YOUR ASTHMA CONTROL: COMPLETELY CONTROLLED
ACT_TOTALSCORE: 25

## 2024-09-05 ENCOUNTER — PRENATAL OFFICE VISIT (OUTPATIENT)
Dept: FAMILY MEDICINE | Facility: CLINIC | Age: 29
End: 2024-09-05
Payer: COMMERCIAL

## 2024-09-05 VITALS
RESPIRATION RATE: 24 BRPM | HEIGHT: 65 IN | WEIGHT: 184.4 LBS | DIASTOLIC BLOOD PRESSURE: 75 MMHG | SYSTOLIC BLOOD PRESSURE: 109 MMHG | BODY MASS INDEX: 30.72 KG/M2 | OXYGEN SATURATION: 94 % | TEMPERATURE: 98.4 F | HEART RATE: 84 BPM

## 2024-09-05 DIAGNOSIS — Z34.03 ENCOUNTER FOR SUPERVISION OF NORMAL FIRST PREGNANCY IN THIRD TRIMESTER: Primary | ICD-10-CM

## 2024-09-05 PROCEDURE — 99207 PR PRENATAL VISIT: CPT | Performed by: FAMILY MEDICINE

## 2024-09-07 NOTE — PROGRESS NOTES
Patient continues to feel well.  She denies contractions, LOF or bleeding.  She notes good fetal movement.  She denies RUQ pain, headaches or blurred vision.  Reviewed who and when to call if she feels that she is in labor.    Concerns: None  Doing well.  No concerns today.  No vaginal bleeding, LOF, contractions.  No HA, RUQ pain, N/V, visual changes.  Cervix is 1.5/60%/-2, medium consistency, posterior.  Cephalic position confirmed by Leopold maneuvers and cervical exam.  Discussed signs of labor and when to call or come in.  Discussed kick counts and fetal movement.  Reportable signs and symptoms discussed.  Labor precautions discussed.  RTC 1 week.    Vale Pringle MD

## 2024-09-07 NOTE — PATIENT INSTRUCTIONS
"Week 37 of Your Pregnancy: Care Instructions    Most babies are born between 37 and 40 weeks.   This is a good time to pack a bag to take with you to the birth. Then it will be ready to go when you are.     Learn about breastfeeding.  For example, find out about ways to hold your baby to make breastfeeding easier. And think about learning how to pump and store milk.     Know that crying is normal.  It's common for babies to cry 1 to 3 hours a day. Some cry more, and some cry less.     Learn why babies cry.  They may be hungry; have gas; need a diaper change; or feel cold, warm, tired, lonely, or tense. Sometimes they cry for unknown reasons.     Think about what will help you stay calm when your baby cries.  Taking slow, deep breaths can help. So can taking a break. It's okay to put your baby somewhere safe (like their crib) and walk away for a few minutes.     Learn about safe sleep for your baby.  Always put your baby to sleep on their back. Place them alone in a crib or bassinet with a firm, flat surface. Keep soft items like stuffed animals out of the crib.     Learn what to expect with  poop.  Your baby will have their own bowel patterns. Some babies have several bowel movements a day. Some have fewer.     Know that  babies will often have loose, yellow bowel movements.  Formula-fed babies have more formed stools. If your baby's poop looks like pellets, your baby is constipated.   Follow-up care is a key part of your treatment and safety. Be sure to make and go to all appointments, and call your doctor if you are having problems. It's also a good idea to know your test results and keep a list of the medicines you take.  Where can you learn more?  Go to https://www.Eterniam.net/patiented  Enter N257 in the search box to learn more about \"Week 37 of Your Pregnancy: Care Instructions.\"  Current as of: July 10, 2023  Content Version: 14. [employer].   Care instructions " adapted under license by your healthcare professional. If you have questions about a medical condition or this instruction, always ask your healthcare professional. [employer] Incorporated disclaims any warranty or liability for your use of this information.

## 2024-09-12 ENCOUNTER — PRENATAL OFFICE VISIT (OUTPATIENT)
Dept: FAMILY MEDICINE | Facility: CLINIC | Age: 29
End: 2024-09-12
Payer: COMMERCIAL

## 2024-09-12 VITALS
WEIGHT: 185.4 LBS | DIASTOLIC BLOOD PRESSURE: 80 MMHG | HEIGHT: 61 IN | OXYGEN SATURATION: 97 % | HEART RATE: 110 BPM | BODY MASS INDEX: 35.01 KG/M2 | TEMPERATURE: 98.6 F | RESPIRATION RATE: 20 BRPM | SYSTOLIC BLOOD PRESSURE: 111 MMHG

## 2024-09-12 DIAGNOSIS — Z23 NEED FOR PROPHYLACTIC VACCINATION AND INOCULATION AGAINST INFLUENZA: ICD-10-CM

## 2024-09-12 DIAGNOSIS — Z34.03 ENCOUNTER FOR SUPERVISION OF NORMAL FIRST PREGNANCY IN THIRD TRIMESTER: Primary | ICD-10-CM

## 2024-09-12 PROCEDURE — 90471 IMMUNIZATION ADMIN: CPT | Performed by: FAMILY MEDICINE

## 2024-09-12 PROCEDURE — 90656 IIV3 VACC NO PRSV 0.5 ML IM: CPT | Performed by: FAMILY MEDICINE

## 2024-09-12 PROCEDURE — 99207 PR PRENATAL VISIT: CPT | Performed by: FAMILY MEDICINE

## 2024-09-12 RX ORDER — ONDANSETRON 4 MG/1
4 TABLET, FILM COATED ORAL EVERY 8 HOURS PRN
Qty: 30 TABLET | Refills: 1 | OUTPATIENT
Start: 2024-09-12

## 2024-09-12 ASSESSMENT — ASTHMA QUESTIONNAIRES
QUESTION_3 LAST FOUR WEEKS HOW OFTEN DID YOUR ASTHMA SYMPTOMS (WHEEZING, COUGHING, SHORTNESS OF BREATH, CHEST TIGHTNESS OR PAIN) WAKE YOU UP AT NIGHT OR EARLIER THAN USUAL IN THE MORNING: NOT AT ALL
QUESTION_2 LAST FOUR WEEKS HOW OFTEN HAVE YOU HAD SHORTNESS OF BREATH: NOT AT ALL
QUESTION_1 LAST FOUR WEEKS HOW MUCH OF THE TIME DID YOUR ASTHMA KEEP YOU FROM GETTING AS MUCH DONE AT WORK, SCHOOL OR AT HOME: NONE OF THE TIME
QUESTION_4 LAST FOUR WEEKS HOW OFTEN HAVE YOU USED YOUR RESCUE INHALER OR NEBULIZER MEDICATION (SUCH AS ALBUTEROL): NOT AT ALL
QUESTION_5 LAST FOUR WEEKS HOW WOULD YOU RATE YOUR ASTHMA CONTROL: COMPLETELY CONTROLLED
ACT_TOTALSCORE: 25
ACT_TOTALSCORE: 25

## 2024-09-12 ASSESSMENT — PATIENT HEALTH QUESTIONNAIRE - PHQ9
SUM OF ALL RESPONSES TO PHQ QUESTIONS 1-9: 11
10. IF YOU CHECKED OFF ANY PROBLEMS, HOW DIFFICULT HAVE THESE PROBLEMS MADE IT FOR YOU TO DO YOUR WORK, TAKE CARE OF THINGS AT HOME, OR GET ALONG WITH OTHER PEOPLE: SOMEWHAT DIFFICULT
SUM OF ALL RESPONSES TO PHQ QUESTIONS 1-9: 11

## 2024-09-12 NOTE — TELEPHONE ENCOUNTER
Pharmacy contacted and medication filled 8/29, per 8/23 Mychart encounter. Closing encounter.     Magno Duarte RN

## 2024-09-12 NOTE — PROGRESS NOTES
Patient reports that she feels well.  Fetal movement has gotten to be more subtle, but still occurs throughout the day.  Discussed kick counting if she feels that there has been a gap in fetal movement.  She denies leakage of fluid or bleeding.  She has had a couple of episodes of sharper lower abdominal pains that seem to resolve with laying down.  Unclear if these are contractions.  She denies any changes in her stools.  She denies right upper quadrant pain, headaches or blurred vision.  Still has no lower extremity edema.  Cervix is posterior, head is low.  There is some thickness to the cervix, unable to reach external os today.    Concerns: None  Doing well.  No concerns today.  No vaginal bleeding, LOF, contractions.  No HA, RUQ pain, N/V, visual changes.  Cervix is unchanged from previous exam.  Cephalic position confirmed by Leopold maneuvers and cervical exam.  Discussed signs of labor and when to call or come in.  Discussed kick counts and fetal movement.  Reportable signs and symptoms discussed.  Labor precautions discussed.  RTC 1 week.    Vale Pringle MD

## 2024-09-12 NOTE — PATIENT INSTRUCTIONS
Week 38 of Your Pregnancy: Care Instructions  Believe it or not, your baby is almost here. You may notice how your baby responds to sounds, warmth, cold, and light. You may even know what kind of music your baby likes.    Even if you expect a vaginal birth, it's a good idea to learn about  section ().  is the delivery of a baby through a cut (incision) in your belly. It's a major surgery, so it has more risks than a vaginal birth.    During the first 2 weeks after the birth, limit visitors. Don't allow visitors who have colds or infections. Ask visitors to wash their hands before they touch your baby. And never let anyone smoke around your baby.    Know about unplanned C-sections.  Reasons for an unplanned  include labor that slows or stops, signs of distress in your baby, and high blood pressure or other problems for you.     Know about planned C-sections.  If your baby isn't in a head-down position for delivery (breech position), your doctor may plan a . Or you may have a planned  if you're having twins or more.     Get as much help as you can while you're in the hospital.  You can learn about feeding, diapering, and bathing your baby.     Talk to your doctor or midwife about how to care for the umbilical cord stump.  If your baby will be circumcised, also ask about how to care for that.     Ask friends or family for help, as you need it.  If you can, have another adult in your home for at least 2 or 3 days after the birth.     Try to nap when your baby naps.  This may be your best chance to get some sleep.     Watch for changes in your mental health.  For the first 1 to 2 weeks after birth, it's common to cry or feel sad or irritable. If these feelings last for more than 2 weeks, you may have postpartum depression. Ask your doctor for help. Postpartum depression can be treated.   Follow-up care is a key part of your treatment and safety. Be sure to make and go  "to all appointments, and call your doctor if you are having problems. It's also a good idea to know your test results and keep a list of the medicines you take.  Where can you learn more?  Go to https://www.Telinet.net/patiented  Enter B044 in the search box to learn more about \"Week 38 of Your Pregnancy: Care Instructions.\"  Current as of: July 10, 2023               Content Version: 14.0    9493-4707 [employer].   Care instructions adapted under license by your healthcare professional. If you have questions about a medical condition or this instruction, always ask your healthcare professional. [employer] disclaims any warranty or liability for your use of this information.      "

## 2024-09-16 ENCOUNTER — TELEPHONE (OUTPATIENT)
Dept: FAMILY MEDICINE | Facility: CLINIC | Age: 29
End: 2024-09-16
Payer: COMMERCIAL

## 2024-09-16 ENCOUNTER — HOSPITAL ENCOUNTER (OUTPATIENT)
Facility: HOSPITAL | Age: 29
Discharge: HOME | End: 2024-09-16
Attending: FAMILY MEDICINE | Admitting: FAMILY MEDICINE
Payer: COMMERCIAL

## 2024-09-16 VITALS
SYSTOLIC BLOOD PRESSURE: 119 MMHG | TEMPERATURE: 98.2 F | RESPIRATION RATE: 16 BRPM | HEART RATE: 90 BPM | DIASTOLIC BLOOD PRESSURE: 81 MMHG

## 2024-09-16 PROBLEM — Z36.89 ENCOUNTER FOR TRIAGE IN PREGNANT PATIENT: Status: ACTIVE | Noted: 2024-09-16

## 2024-09-16 PROCEDURE — 258N000003 HC RX IP 258 OP 636: Performed by: FAMILY MEDICINE

## 2024-09-16 PROCEDURE — 250N000013 HC RX MED GY IP 250 OP 250 PS 637: Performed by: FAMILY MEDICINE

## 2024-09-16 PROCEDURE — G0463 HOSPITAL OUTPT CLINIC VISIT: HCPCS

## 2024-09-16 RX ORDER — LIDOCAINE 40 MG/G
CREAM TOPICAL
Status: DISCONTINUED | OUTPATIENT
Start: 2024-09-16 | End: 2024-09-16 | Stop reason: HOSPADM

## 2024-09-16 RX ORDER — METOCLOPRAMIDE 10 MG/1
10 TABLET ORAL ONCE
Status: COMPLETED | OUTPATIENT
Start: 2024-09-16 | End: 2024-09-16

## 2024-09-16 RX ORDER — ACETAMINOPHEN 500 MG
1000 TABLET ORAL ONCE
Status: COMPLETED | OUTPATIENT
Start: 2024-09-16 | End: 2024-09-16

## 2024-09-16 RX ORDER — ACETAMINOPHEN 500 MG
TABLET ORAL
Status: DISCONTINUED
Start: 2024-09-16 | End: 2024-09-16 | Stop reason: HOSPADM

## 2024-09-16 RX ADMIN — ACETAMINOPHEN 1000 MG: 500 TABLET ORAL at 16:25

## 2024-09-16 RX ADMIN — METOCLOPRAMIDE 10 MG: 10 TABLET ORAL at 16:25

## 2024-09-16 RX ADMIN — SODIUM CHLORIDE, POTASSIUM CHLORIDE, SODIUM LACTATE AND CALCIUM CHLORIDE 500 ML: 600; 310; 30; 20 INJECTION, SOLUTION INTRAVENOUS at 16:19

## 2024-09-16 ASSESSMENT — ACTIVITIES OF DAILY LIVING (ADL)
ADLS_ACUITY_SCORE: 35
ADLS_ACUITY_SCORE: 35

## 2024-09-16 NOTE — TELEPHONE ENCOUNTER
Patient called reporting headache and cramping, she was recently seen in [facility] ER - advised that if sx return to call back. Provided the OB number for [facility] as previously communicated in other encounters.    [phone]    KENYA Walter

## 2024-09-16 NOTE — DISCHARGE INSTRUCTIONS
Learning About When to Call Your Doctor During Pregnancy (After 20 Weeks)  Overview  It's common to have concerns about what might be a problem when you're pregnant. Most pregnancies don't have any serious problems. But it's still important to know when to call your doctor if you have certain symptoms or signs of labor.  These are general suggestions. Your doctor may give you some more information about when to call.  When to call your doctor (after 20 weeks)  Call 911  anytime you think you may need emergency care. For example, call if:  You have severe vaginal bleeding. This means you are soaking through a pad each hour for 2 or more hours.  You have sudden, severe pain in your belly.  You have chest pain, are short of breath, or cough up blood.  You passed out (lost consciousness).  You have a seizure.  You see or feel the umbilical cord.  You think you are about to deliver your baby and can't make it safely to the hospital or birthing center.  Call your doctor now or seek immediate medical care if:  You have vaginal bleeding.  You have belly pain.  You have a fever.  You are dizzy or lightheaded, or you feel like you may faint.  You have signs of a blood clot in your leg (called a deep vein thrombosis), such as:  Pain in the calf, back of the knee, thigh, or groin.  Swelling in your leg or groin.  A color change on the leg or groin. The skin may be reddish or purplish, depending on your usual skin color.  You have symptoms of preeclampsia, such as:  Sudden swelling of your face, hands, or feet.  New vision problems (such as dimness, blurring, or seeing spots).  A severe headache.  You have a sudden release of fluid from your vagina. (You think your water broke.)  You've been having regular contractions for an hour. This means that you've had at least 6 contractions within 1 hour, even after you change your position and drink fluids.  You notice that your baby has stopped moving or is moving less than  "normal.  You have signs of heart failure, such as:  New or increased shortness of breath.  New or worse swelling in your legs, ankles, or feet.  Sudden weight gain, such as more than 2 to 3 pounds in a day or 5 pounds in a week.  Feeling so tired or weak that you cannot do your usual activities.  You have symptoms of a urinary tract infection. These may include:  Pain or burning when you urinate.  A frequent need to urinate without being able to pass much urine.  Pain in the flank, which is just below the rib cage and above the waist on either side of the back.  Blood in your urine.  Watch closely for changes in your health, and be sure to contact your doctor if:  You have vaginal discharge that smells bad.  You feel sad, anxious, or hopeless for more than a few days.  You have skin changes, such as a rash, itching, or a yellow color to your skin.  You have other concerns about your pregnancy.  If you have labor signs at 37 weeks or more  If you have signs of labor at 37 weeks or more, your doctor may tell you to call when your labor becomes more active. Symptoms of active labor include:  Contractions that are regular.  Contractions that are less than 5 minutes apart.  Contractions that are hard to talk through.  Follow-up care is a key part of your treatment and safety. Be sure to make and go to all appointments, and call your doctor if you are having problems. It's also a good idea to know your test results and keep a list of the medicines you take.  Where can you learn more?  Go to https://www.SolarNOW.net/patiented  Enter N531 in the search box to learn more about \"Learning About When to Call Your Doctor During Pregnancy (After 20 Weeks).\"  Current as of: July 10, 2023  Content Version: 14.1    0657-0976 [employer].   Care instructions adapted under license by your healthcare professional. If you have questions about a medical condition or this instruction, always ask your healthcare " professional. [employer] disclaims any warranty or liability for your use of this information.

## 2024-09-16 NOTE — PROGRESS NOTES
Pt presented to [facility] c/o headache for the last 3 days. Pt is a  39.0 weeks. Pt denies any issues or concerns with pregnancy. Denies visual changes, epigastric pain and swelling.   Monitors applied, vss, afebrile. Pt also noted a rash under both breast, per assessment looks red, but no raised bumps/hives noted. Has had this for a few days.

## 2024-09-16 NOTE — PROGRESS NOTES
Spoke with Dr. Pringle at 1602, informed of pt status. Orders received for 500 ml IV fluid bolus of lactated ringers, 1000 mg Tylenol PO and 10 mg Reglan PO.    This RN discussed plan with pt, she verbalized understanding and agreed with plan of care.    This RN placed a 18g IV in right wrist and started fluid bolus at 1619. Administered Tylenol and Reglan at 1625.

## 2024-09-19 ENCOUNTER — PRENATAL OFFICE VISIT (OUTPATIENT)
Dept: FAMILY MEDICINE | Facility: CLINIC | Age: 29
End: 2024-09-19
Payer: COMMERCIAL

## 2024-09-19 ENCOUNTER — HOSPITAL ENCOUNTER (OUTPATIENT)
Facility: CLINIC | Age: 29
Discharge: HOME | End: 2024-09-19
Attending: FAMILY MEDICINE | Admitting: FAMILY MEDICINE
Payer: COMMERCIAL

## 2024-09-19 VITALS
DIASTOLIC BLOOD PRESSURE: 87 MMHG | HEIGHT: 60 IN | WEIGHT: 185 LBS | RESPIRATION RATE: 18 BRPM | SYSTOLIC BLOOD PRESSURE: 133 MMHG | TEMPERATURE: 97.9 F | BODY MASS INDEX: 36.32 KG/M2

## 2024-09-19 VITALS
HEART RATE: 101 BPM | RESPIRATION RATE: 20 BRPM | BODY MASS INDEX: 35.65 KG/M2 | TEMPERATURE: 98.6 F | OXYGEN SATURATION: 97 % | HEIGHT: 61 IN | DIASTOLIC BLOOD PRESSURE: 81 MMHG | SYSTOLIC BLOOD PRESSURE: 111 MMHG | WEIGHT: 188.8 LBS

## 2024-09-19 DIAGNOSIS — Z34.03 ENCOUNTER FOR SUPERVISION OF NORMAL FIRST PREGNANCY IN THIRD TRIMESTER: Primary | ICD-10-CM

## 2024-09-19 PROBLEM — Z36.89 ENCOUNTER FOR TRIAGE IN PREGNANT PATIENT: Status: RESOLVED | Noted: 2024-09-16 | Resolved: 2024-09-19

## 2024-09-19 PROCEDURE — G0463 HOSPITAL OUTPT CLINIC VISIT: HCPCS

## 2024-09-19 PROCEDURE — 99207 PR PRENATAL VISIT: CPT | Performed by: FAMILY MEDICINE

## 2024-09-19 RX ORDER — LIDOCAINE 40 MG/G
CREAM TOPICAL
Status: DISCONTINUED | OUTPATIENT
Start: 2024-09-19 | End: 2024-09-19 | Stop reason: HOSPADM

## 2024-09-19 ASSESSMENT — ASTHMA QUESTIONNAIRES
ACT_TOTALSCORE: 25
QUESTION_1 LAST FOUR WEEKS HOW MUCH OF THE TIME DID YOUR ASTHMA KEEP YOU FROM GETTING AS MUCH DONE AT WORK, SCHOOL OR AT HOME: NONE OF THE TIME
QUESTION_4 LAST FOUR WEEKS HOW OFTEN HAVE YOU USED YOUR RESCUE INHALER OR NEBULIZER MEDICATION (SUCH AS ALBUTEROL): NOT AT ALL
QUESTION_2 LAST FOUR WEEKS HOW OFTEN HAVE YOU HAD SHORTNESS OF BREATH: NOT AT ALL
QUESTION_3 LAST FOUR WEEKS HOW OFTEN DID YOUR ASTHMA SYMPTOMS (WHEEZING, COUGHING, SHORTNESS OF BREATH, CHEST TIGHTNESS OR PAIN) WAKE YOU UP AT NIGHT OR EARLIER THAN USUAL IN THE MORNING: NOT AT ALL
ACT_TOTALSCORE: 25
QUESTION_5 LAST FOUR WEEKS HOW WOULD YOU RATE YOUR ASTHMA CONTROL: COMPLETELY CONTROLLED

## 2024-09-19 ASSESSMENT — PATIENT HEALTH QUESTIONNAIRE - PHQ9
SUM OF ALL RESPONSES TO PHQ QUESTIONS 1-9: 12
SUM OF ALL RESPONSES TO PHQ QUESTIONS 1-9: 12
10. IF YOU CHECKED OFF ANY PROBLEMS, HOW DIFFICULT HAVE THESE PROBLEMS MADE IT FOR YOU TO DO YOUR WORK, TAKE CARE OF THINGS AT HOME, OR GET ALONG WITH OTHER PEOPLE: SOMEWHAT DIFFICULT

## 2024-09-19 ASSESSMENT — ACTIVITIES OF DAILY LIVING (ADL): ADLS_ACUITY_SCORE: 20

## 2024-09-19 NOTE — PATIENT INSTRUCTIONS
"Week 39 of Your Pregnancy: Care Instructions     babies can look different than what you see in pictures or movies. Their heads can be a strange shape right after birth. And they may have swollen eyes and red marks on their faces.    You can still get pregnant even if you are breastfeeding. If you don't want to get pregnant, talk to your doctor about birth control.  Tips for week 39 of pregnancy  If you plan to breastfeed, get prepared.     Continue to eat healthy foods.  Keep taking your prenatal vitamins during breastfeeding if your doctor recommends it.  Talk to your doctor before taking any medicines or supplements.  Choose the right birth control for you.     Intrauterine devices (IUDs) are placed in the uterus. Sometimes the IUD can be placed right after giving birth. They work for years.  Hormonal implants are placed under the skin of the arm. They also work for years.  Depo-Provera is a shot. You get it every 3 months.  Birth control pills can be used. They're taken every day.  Tubal ligation (tying your tubes) and vasectomy are surgeries. They're permanent.  Diaphragms, spermicide, and condoms must be used each time you have sex. If you used a diaphragm before, you should get refitted after the baby is born.  A birth control patch or ring can be used. These just can't be started until several weeks after you give birth.  Follow-up care is a key part of your treatment and safety. Be sure to make and go to all appointments, and call your doctor if you are having problems. It's also a good idea to know your test results and keep a list of the medicines you take.  Where can you learn more?  Go to https://[email or web address].net/patiented  Enter A811 in the search box to learn more about \"Week 39 of Your Pregnancy: Care Instructions.\"  Current as of: July 10, 2023               Content Version: 14.0    7949-5681 Healthwise, Incorporated.   Care instructions adapted under license by your healthcare " "professional. If you have questions about a medical condition or this instruction, always ask your healthcare professional. Healthwise, [facility] disclaims any warranty or liability for your use of this information.      Labor Induction  What You Need to Know  What is labor induction?  In most cases, it is best to go into labor naturally. When labor does not start on its own, we may use medicine or other methods to start (induce) labor. This is called induction, which:  Helps the uterus contract  Thins, softens and opens the cervix (opening of the uterus)  When is induction used?  There are two types of induction.  Medical induction may be done to protect the health of the parent or baby if:  There are medical concerns for you or your baby.  You haven't had your baby by 41 weeks.  Induction for non-medical reasons may be done at 39 weeks or later if:  You live far from the hospital.  You've been having contractions for many days.  You've given birth quickly in the past.   We will not perform an induction for non-medical reasons before 39 weeks. Studies show that babies born before 39 weeks may struggle with breathing, feeding, sleeping and staying warm. They are more likely to have health problems and may need to stay in the hospital longer. If we start your labor for medical reasons, the benefits will outweigh these risks. We will talk to you about your risks, benefits and alternatives (other options) before we start your labor.  How is induction done?  We may start your labor by doing one or more of the following:  We may need to help your cervix soften and open (sometimes called \"ripening\") to prepare it for labor. There are two ways to do this:  Medicines--these may be in the form of pills that you swallow or medicines that are put into the vagina next to the cervix.  Mechanical--using either a single or double balloon. These are small balloons that are attached to tubes that go up inside the cervix. The " "balloons are then filled with water to put pressure on the cervix and help the cervix soften and open. Depending on the type of balloon used, you may be allowed to go home after it is placed.  After your cervix is ready:  We may give you medicine through an IV (a small tube placed in your vein). This medicine is called Pitocin. It helps the muscles of your uterus to contract.  We may make a small opening in your bag of water (the sac around your baby). This is called an amniotomy. Sometimes called \"breaking the water\". It may help your uterus contract and your cervix open.  What might happen if my labor is induced?  Some of this depends on how your labor is started and how your body responds. Your labor may be more complicated. You and your baby may need more medical treatments. In general:  You may not go into labor right away. If so, we may send you home with follow-up instructions.  It will be important to monitor you and your baby during the induction.  You may not be able to move around during labor. You will have two belts with monitors attached to your belly. These allow the nurse to watch your contractions and your baby's heart rate.  Your labor may take longer than if you went into labor on your own. It might take more than one day.  If you need cervical ripening, it is important to know that it may be many hours (even days) until delivery happens.  Cervical ripening can be slow and require several doses before your body is ready to labor.  A long labor may increase the risk of infection in mother and baby.  Your labor may not progress as planned. This could lead to a  birth.  Can I plan the date of my delivery?  After 39 weeks, you may ask about planning your delivery date. This is only an option if your body--and your baby--are ready. To find out, we will check your cervix and your baby's heart rate.   If you are ready to be induced, we will give you a date and time to come to the hospital. If " many patients are in labor that day, we may need to start your labor at another time.  If you are not ready, we will not start your labor. It will be safer for your baby to come on its own.  What else do I need to know?  Before you have an induction, make sure you understand the reasons, risks and benefits. Ask your doctor or nurse-midwife:  Why do I need to be induced?  What are the risks to my baby?  How will you start my labor?  How will you know if my baby is ready to be born?  How will you know if my body is ready to give birth?  Where can I get more information?  To learn more about induction, you may visit these websites:  The American College of Nurse-Midwives:  www.mymidwife.org  The American College of Obstetricians and Gynecologists: www.acog.org  Childbirth Connection:  www.childbirthconnection.org  March of Dimes: www.marchofdimes.com  Association of Women's Health, Obstetrics, and  Nursing  www.qvobex5mdu.org/go-the-full-40&#047;  For informational purposes only. Not to replace the advice of your health care provider. Copyright   2008 [employer]. All rights reserved. Clinically reviewed by the  System Operations Leadership team. [employer] 321938 - REV .

## 2024-09-19 NOTE — PROGRESS NOTES
Patient presented to maternity care last night with contractions.  She was able to empty her bladder on maternity and contractions eventually slowed.  Her cervix was unchanged from her exam in clinic and she was discharged.  She states that she has felt crampy throughout the morning, but no other strong contractions.  She denies leakage of fluid or bleeding.  She reports good fetal movement, denies right upper quadrant pain, headaches or blurred vision.  Discussed planned induction of labor at 41 weeks if she remains pregnant at that time.  Biophysical profile ordered for her to schedule anytime after her due date.    Concerns: None  Doing well.  No concerns today.  No HA, RUQ pain, N/V, visual changes.  Cervix is not reached/60%/-2, soft, very posterior.  Cephalic position confirmed by Leopold maneuvers and cervical exam.  Discussed signs of labor and when to call or come in.  Discussed kick counts and fetal movement.  Biophysical profile ordered.  Reportable signs and symptoms discussed.  Labor precautions discussed.  RTC 1 week.    Vale Pringle MD

## 2024-09-19 NOTE — PROGRESS NOTES
Data: Patient assessed in the [facility] for uterine contractions. Cervix 1.5 cm dilated and 60% effaced. Fetal station -1. Membranes intact. Contractions are  , 5 minutes apart, and last 110-120+ seconds. Uterine assessment is no contractions during contractions and soft  at rest. See flowsheets for fetal assessment documentation.     Action:  Presumed adequate fetal oxygenation documented. Early labor precautions and discharge instructions reviewed, including when to notify provider if warning signs present. Patient instructed to report decrease in fetal movement, vaginal bleeding, changes in membrane status, abdominal pain, or any concerns related to the pregnancy to patient's provider/clinic.     Response: Orders to discharge home per Vale Pringle. Plan for patient is to re-evaluate labor status by following up with provider today at 0900. Patient verbalized understanding of education and agreement with plan. Discharged to home at 0315.

## 2024-09-19 NOTE — PROGRESS NOTES
Data: Patient presented to [facility]: 2024  2:26 AM.  Reason for maternal/fetal assessment is uterine contractions. Patient reports contractions that began at midnight and have been 3-5 minutes since. However, apon arrival they seem to have spaced out a lot. Patient denies leaking of vaginal fluid/rupture of membranes, vaginal bleeding, abdominal pain, pelvic pressure, nausea, vomiting, headache, visual disturbances, epigastric or RUQ pain, significant edema. Patient reports fetal movement is normal. Patient is a 39w3d . Prenatal record reviewed. Pregnancy has been uncomplicated. Support person is present.     Fetal HR baseline was 140, variability is moderate (amplitude range 6 to 25 bpm). Accelerations: increase 15 bpm above baseline lasting 15 seconds. Decelerations: absent. Uterine assessment is no contractions during contractions and   at rest. Cervix 1.5 cm dilated and 60% effaced. Fetal station -1. Fetal presentation vertex  per cervical exam. Membranes: intact.    Vital signs wnl. Patient reports no pain and is coping.     Action: Verbal consent for EFM. Triage assessment completed. Patient may meet criteria for early labor discharge.     Response: Patient verbalized understanding of triage assessment. Will contact Vale Pringle with assessment and consideration of early labor discharge vs admission.

## 2024-09-21 ENCOUNTER — HOSPITAL ENCOUNTER (OUTPATIENT)
Facility: CLINIC | Age: 29
Discharge: HOME | End: 2024-09-21
Attending: FAMILY MEDICINE | Admitting: FAMILY MEDICINE
Payer: COMMERCIAL

## 2024-09-21 ENCOUNTER — TELEPHONE (OUTPATIENT)
Dept: OBGYN | Facility: HOSPITAL | Age: 29
End: 2024-09-21
Payer: COMMERCIAL

## 2024-09-21 VITALS
TEMPERATURE: 99.4 F | WEIGHT: 188 LBS | BODY MASS INDEX: 35.5 KG/M2 | HEIGHT: 61 IN | SYSTOLIC BLOOD PRESSURE: 129 MMHG | DIASTOLIC BLOOD PRESSURE: 82 MMHG

## 2024-09-21 PROBLEM — Z36.89 ENCOUNTER FOR TRIAGE IN PREGNANT PATIENT: Status: ACTIVE | Noted: 2024-09-21

## 2024-09-21 LAB
CLUE CELLS: ABNORMAL
CRYSTALS AMN MICRO: NORMAL
RUPTURE OF FETAL MEMBRANES BY ROM PLUS: NEGATIVE
TRICHOMONAS, WET PREP: ABNORMAL
WBC'S/HIGH POWER FIELD, WET PREP: ABNORMAL
YEAST, WET PREP: ABNORMAL

## 2024-09-21 PROCEDURE — 87210 SMEAR WET MOUNT SALINE/INK: CPT | Performed by: FAMILY MEDICINE

## 2024-09-21 PROCEDURE — G0463 HOSPITAL OUTPT CLINIC VISIT: HCPCS

## 2024-09-21 PROCEDURE — 84112 EVAL AMNIOTIC FLUID PROTEIN: CPT | Performed by: FAMILY MEDICINE

## 2024-09-21 RX ORDER — LIDOCAINE 40 MG/G
CREAM TOPICAL
Status: DISCONTINUED | OUTPATIENT
Start: 2024-09-21 | End: 2024-09-22 | Stop reason: HOSPADM

## 2024-09-21 ASSESSMENT — ACTIVITIES OF DAILY LIVING (ADL)
ADLS_ACUITY_SCORE: 35

## 2024-09-22 NOTE — PROGRESS NOTES
Data: Patient presented to [facility]: 2024  7:57 PM.  Reason for maternal/fetal assessment is leaking of fluids. Patient reports she first felt a trickle of fluid around 10 am. She has worn a pad today and says she continues to have occasional leaking. The pad she presents in does not have much fluid present. Patient denies uterine contractions, abdominal pain. Patient reports fetal movement is normal. Patient is a 39w5d . Prenatal record reviewed. Pregnancy has been uncomplicated. Support person is present.     Fetal HR baseline was  , variability is  . Accelerations:  . Decelerations:  . Uterine assessment is   during contractions and   at rest. Cervix 1.5 cm dilated and 60-70% effaced. Fetal station -2. Fetal presentation   per Leopolds and cervical exam. Membranes: Fern test pending and ROM Plus test pending.    Vital signs wnl. Patient reports no pain and is coping.     Action: Verbal consent for EFM. Triage assessment completed. Patient may meet criteria for early labor discharge.     Response: Patient verbalized understanding of triage assessment. Will contact Dr Pringle  with assessment and consideration of early labor discharge vs admission.

## 2024-09-22 NOTE — DISCHARGE INSTRUCTIONS
EARLY LABOR DISCHARGE INSTRUCTIONS    You were seen for: Labor Assessment  You had (Test or Medicine): Linda, Wet prep, ROM     Refer to Any Day Now handout for tips on how to labor at home    WHEN TO CALL YOUR PROVIDER:  If this is your first baby: Your contractions (tightening) are 5 minutes apart, last more than 1 minute, and have been consistently getting stronger for 1 hour or more  If this is your second baby or beyond: Your contractions are less than 10 minutes apart and have been consistently getting stronger for 1 hour or more  Feeling your baby move less than usual  Temperature of 100.4 F (38 C) or higher  New fluid leaking from your vagina  Other signs your provider asked you to look for in your body  Vaginal bleeding (bright red blood)  Swelling in your face or more swelling in your hands or legs  Headaches that don't get better after taking Tylenol (acetaminophen)  Changes in your vision (blurry or seeing spots or stars)  Nausea (sick to your stomach) and vomiting (throwing up)  Heartburn that doesn't go away  Sudden, bad belly pain that is unlike your contractions    IF YOU ARRIVED WITH YOUR WATER ALREADY BROKEN (MEMBRANES RUPTURED):  Avoid placing anything in vagina, including intercourse (sex)  Check your temperature every 3 hours when awake  Call your provider/clinic if:  Your temperature is 100.4 F (38 C) or higher  Your fluid becomes not clear or is smelly  Your baby is moving less than usual  You don't go into labor within 24 hours of your water breaking  You have other concerns  Follow up plan: Follow up at next scheduled appt       FOLLOW UP:  As scheduled in the clinic

## 2024-09-22 NOTE — PROGRESS NOTES
Data: Patient assessed in the [facility] for leaking vaginal fluid. Cervix 1.5 cm dilated and 60-70% effaced. Fetal station -2. Membranes intact. Both ROM and  Fern and wet prep negative. Contractions are  ,   minutes apart, and last   seconds. Uterine assessment is   during contractions and   at rest. See flowsheets for fetal assessment documentation.     Action:  Presumed adequate fetal oxygenation documented. Early labor precautions and discharge instructions reviewed, including when to notify provider if warning signs present. Patient instructed to report decrease in fetal movement, vaginal bleeding, changes in membrane status, abdominal pain, or any concerns related to the pregnancy to patient's provider/clinic.     Response: Orders to discharge home per Dr Pringle . Plan for patient is to re-evaluate labor status by following up with provider as scheduled in clinic . Patient verbalized understanding of education and agreement with plan. Discharged to home at 2220.

## 2024-09-23 ENCOUNTER — ANCILLARY PROCEDURE (OUTPATIENT)
Dept: ULTRASOUND IMAGING | Facility: CLINIC | Age: 29
End: 2024-09-23
Attending: FAMILY MEDICINE
Payer: COMMERCIAL

## 2024-09-23 DIAGNOSIS — Z34.03 ENCOUNTER FOR SUPERVISION OF NORMAL FIRST PREGNANCY IN THIRD TRIMESTER: ICD-10-CM

## 2024-09-23 PROCEDURE — 76819 FETAL BIOPHYS PROFIL W/O NST: CPT | Mod: TC | Performed by: RADIOLOGY

## 2024-09-23 PROCEDURE — 76816 OB US FOLLOW-UP PER FETUS: CPT | Mod: TC | Performed by: RADIOLOGY

## 2024-09-26 ENCOUNTER — PRENATAL OFFICE VISIT (OUTPATIENT)
Dept: FAMILY MEDICINE | Facility: CLINIC | Age: 29
End: 2024-09-26
Payer: COMMERCIAL

## 2024-09-26 VITALS
WEIGHT: 188.6 LBS | OXYGEN SATURATION: 94 % | BODY MASS INDEX: 35.61 KG/M2 | RESPIRATION RATE: 20 BRPM | DIASTOLIC BLOOD PRESSURE: 77 MMHG | HEART RATE: 87 BPM | SYSTOLIC BLOOD PRESSURE: 115 MMHG | TEMPERATURE: 98.5 F | HEIGHT: 61 IN

## 2024-09-26 DIAGNOSIS — Z34.03 ENCOUNTER FOR SUPERVISION OF NORMAL FIRST PREGNANCY IN THIRD TRIMESTER: Primary | ICD-10-CM

## 2024-09-26 PROCEDURE — 59426 ANTEPARTUM CARE ONLY: CPT | Performed by: FAMILY MEDICINE

## 2024-09-26 ASSESSMENT — ASTHMA QUESTIONNAIRES
ACT_TOTALSCORE: 25
ACT_TOTALSCORE: 25
QUESTION_4 LAST FOUR WEEKS HOW OFTEN HAVE YOU USED YOUR RESCUE INHALER OR NEBULIZER MEDICATION (SUCH AS ALBUTEROL): NOT AT ALL
QUESTION_3 LAST FOUR WEEKS HOW OFTEN DID YOUR ASTHMA SYMPTOMS (WHEEZING, COUGHING, SHORTNESS OF BREATH, CHEST TIGHTNESS OR PAIN) WAKE YOU UP AT NIGHT OR EARLIER THAN USUAL IN THE MORNING: NOT AT ALL
QUESTION_5 LAST FOUR WEEKS HOW WOULD YOU RATE YOUR ASTHMA CONTROL: COMPLETELY CONTROLLED
QUESTION_2 LAST FOUR WEEKS HOW OFTEN HAVE YOU HAD SHORTNESS OF BREATH: NOT AT ALL
QUESTION_1 LAST FOUR WEEKS HOW MUCH OF THE TIME DID YOUR ASTHMA KEEP YOU FROM GETTING AS MUCH DONE AT WORK, SCHOOL OR AT HOME: NONE OF THE TIME

## 2024-09-27 ENCOUNTER — ANESTHESIA EVENT (OUTPATIENT)
Dept: OBGYN | Facility: HOSPITAL | Age: 29
End: 2024-09-27
Payer: COMMERCIAL

## 2024-09-27 ENCOUNTER — HOSPITAL ENCOUNTER (INPATIENT)
Facility: HOSPITAL | Age: 29
LOS: 4 days | Discharge: HOME HEALTH | End: 2024-10-01
Attending: FAMILY MEDICINE | Admitting: OBSTETRICS & GYNECOLOGY
Payer: COMMERCIAL

## 2024-09-27 ENCOUNTER — ANESTHESIA (OUTPATIENT)
Dept: OBGYN | Facility: HOSPITAL | Age: 29
End: 2024-09-27
Payer: COMMERCIAL

## 2024-09-27 DIAGNOSIS — D62 ANEMIA DUE TO BLOOD LOSS, ACUTE: ICD-10-CM

## 2024-09-27 DIAGNOSIS — Z98.891 S/P CESAREAN SECTION: ICD-10-CM

## 2024-09-27 PROBLEM — Z34.90 PREGNANCY: Status: ACTIVE | Noted: 2024-09-27

## 2024-09-27 LAB
ABO/RH(D): NORMAL
ANTIBODY SCREEN: NEGATIVE
HGB BLD-MCNC: 11.1 G/DL (ref 11.7–15.7)
SPECIMEN EXPIRATION DATE: NORMAL
T PALLIDUM AB SER QL: NONREACTIVE

## 2024-09-27 PROCEDURE — 85049 AUTOMATED PLATELET COUNT: CPT | Performed by: OBSTETRICS & GYNECOLOGY

## 2024-09-27 PROCEDURE — 3E0R3BZ INTRODUCTION OF ANESTHETIC AGENT INTO SPINAL CANAL, PERCUTANEOUS APPROACH: ICD-10-PCS | Performed by: ANESTHESIOLOGY

## 2024-09-27 PROCEDURE — 258N000003 HC RX IP 258 OP 636: Performed by: FAMILY MEDICINE

## 2024-09-27 PROCEDURE — 00HU33Z INSERTION OF INFUSION DEVICE INTO SPINAL CANAL, PERCUTANEOUS APPROACH: ICD-10-PCS | Performed by: ANESTHESIOLOGY

## 2024-09-27 PROCEDURE — 10907ZC DRAINAGE OF AMNIOTIC FLUID, THERAPEUTIC FROM PRODUCTS OF CONCEPTION, VIA NATURAL OR ARTIFICIAL OPENING: ICD-10-PCS | Performed by: FAMILY MEDICINE

## 2024-09-27 PROCEDURE — 250N000011 HC RX IP 250 OP 636: Performed by: ANESTHESIOLOGY

## 2024-09-27 PROCEDURE — 120N000001 HC R&B MED SURG/OB

## 2024-09-27 PROCEDURE — 86900 BLOOD TYPING SEROLOGIC ABO: CPT | Performed by: FAMILY MEDICINE

## 2024-09-27 PROCEDURE — 85018 HEMOGLOBIN: CPT | Performed by: FAMILY MEDICINE

## 2024-09-27 PROCEDURE — 250N000009 HC RX 250: Performed by: FAMILY MEDICINE

## 2024-09-27 PROCEDURE — 36415 COLL VENOUS BLD VENIPUNCTURE: CPT | Performed by: FAMILY MEDICINE

## 2024-09-27 PROCEDURE — 3E033VJ INTRODUCTION OF OTHER HORMONE INTO PERIPHERAL VEIN, PERCUTANEOUS APPROACH: ICD-10-PCS | Performed by: FAMILY MEDICINE

## 2024-09-27 PROCEDURE — 86901 BLOOD TYPING SEROLOGIC RH(D): CPT | Performed by: FAMILY MEDICINE

## 2024-09-27 PROCEDURE — 86780 TREPONEMA PALLIDUM: CPT | Performed by: FAMILY MEDICINE

## 2024-09-27 RX ORDER — LOPERAMIDE HCL 2 MG
2 CAPSULE ORAL
Status: DISCONTINUED | OUTPATIENT
Start: 2024-09-27 | End: 2024-09-28 | Stop reason: HOSPADM

## 2024-09-27 RX ORDER — METOCLOPRAMIDE HYDROCHLORIDE 5 MG/ML
10 INJECTION INTRAMUSCULAR; INTRAVENOUS EVERY 6 HOURS PRN
Status: DISCONTINUED | OUTPATIENT
Start: 2024-09-27 | End: 2024-09-28 | Stop reason: HOSPADM

## 2024-09-27 RX ORDER — TRANEXAMIC ACID 10 MG/ML
1 INJECTION, SOLUTION INTRAVENOUS EVERY 30 MIN PRN
Status: DISCONTINUED | OUTPATIENT
Start: 2024-09-27 | End: 2024-09-28 | Stop reason: HOSPADM

## 2024-09-27 RX ORDER — ONDANSETRON 4 MG/1
4 TABLET, ORALLY DISINTEGRATING ORAL EVERY 6 HOURS PRN
Status: DISCONTINUED | OUTPATIENT
Start: 2024-09-27 | End: 2024-09-28 | Stop reason: HOSPADM

## 2024-09-27 RX ORDER — OXYTOCIN 10 [USP'U]/ML
10 INJECTION, SOLUTION INTRAMUSCULAR; INTRAVENOUS
Status: DISCONTINUED | OUTPATIENT
Start: 2024-09-27 | End: 2024-10-01 | Stop reason: HOSPADM

## 2024-09-27 RX ORDER — LOPERAMIDE HCL 2 MG
4 CAPSULE ORAL
Status: DISCONTINUED | OUTPATIENT
Start: 2024-09-27 | End: 2024-09-28 | Stop reason: HOSPADM

## 2024-09-27 RX ORDER — OXYTOCIN/0.9 % SODIUM CHLORIDE 30/500 ML
100-340 PLASTIC BAG, INJECTION (ML) INTRAVENOUS CONTINUOUS PRN
Status: DISCONTINUED | OUTPATIENT
Start: 2024-09-27 | End: 2024-10-01 | Stop reason: HOSPADM

## 2024-09-27 RX ORDER — OXYTOCIN/0.9 % SODIUM CHLORIDE 30/500 ML
340 PLASTIC BAG, INJECTION (ML) INTRAVENOUS CONTINUOUS PRN
Status: DISCONTINUED | OUTPATIENT
Start: 2024-09-27 | End: 2024-09-28 | Stop reason: HOSPADM

## 2024-09-27 RX ORDER — NALBUPHINE HYDROCHLORIDE 20 MG/ML
2.5-5 INJECTION, SOLUTION INTRAMUSCULAR; INTRAVENOUS; SUBCUTANEOUS EVERY 6 HOURS PRN
Status: DISCONTINUED | OUTPATIENT
Start: 2024-09-27 | End: 2024-10-01 | Stop reason: HOSPADM

## 2024-09-27 RX ORDER — PROCHLORPERAZINE MALEATE 10 MG
10 TABLET ORAL EVERY 6 HOURS PRN
Status: DISCONTINUED | OUTPATIENT
Start: 2024-09-27 | End: 2024-09-28 | Stop reason: HOSPADM

## 2024-09-27 RX ORDER — ONDANSETRON 2 MG/ML
4 INJECTION INTRAMUSCULAR; INTRAVENOUS EVERY 6 HOURS PRN
Status: DISCONTINUED | OUTPATIENT
Start: 2024-09-27 | End: 2024-09-28 | Stop reason: HOSPADM

## 2024-09-27 RX ORDER — METHYLERGONOVINE MALEATE 0.2 MG/ML
200 INJECTION INTRAVENOUS
Status: DISCONTINUED | OUTPATIENT
Start: 2024-09-27 | End: 2024-09-28 | Stop reason: HOSPADM

## 2024-09-27 RX ORDER — MISOPROSTOL 200 UG/1
800 TABLET ORAL
Status: DISCONTINUED | OUTPATIENT
Start: 2024-09-27 | End: 2024-09-28 | Stop reason: HOSPADM

## 2024-09-27 RX ORDER — NALOXONE HYDROCHLORIDE 0.4 MG/ML
0.4 INJECTION, SOLUTION INTRAMUSCULAR; INTRAVENOUS; SUBCUTANEOUS
Status: DISCONTINUED | OUTPATIENT
Start: 2024-09-27 | End: 2024-09-28 | Stop reason: HOSPADM

## 2024-09-27 RX ORDER — OXYTOCIN/0.9 % SODIUM CHLORIDE 30/500 ML
1-24 PLASTIC BAG, INJECTION (ML) INTRAVENOUS CONTINUOUS
Status: DISCONTINUED | OUTPATIENT
Start: 2024-09-27 | End: 2024-09-28 | Stop reason: HOSPADM

## 2024-09-27 RX ORDER — BUPIVACAINE HYDROCHLORIDE 2.5 MG/ML
INJECTION, SOLUTION EPIDURAL; INFILTRATION; INTRACAUDAL
Status: COMPLETED | OUTPATIENT
Start: 2024-09-27 | End: 2024-09-27

## 2024-09-27 RX ORDER — LIDOCAINE 40 MG/G
CREAM TOPICAL
Status: DISCONTINUED | OUTPATIENT
Start: 2024-09-27 | End: 2024-09-28 | Stop reason: HOSPADM

## 2024-09-27 RX ORDER — CARBOPROST TROMETHAMINE 250 UG/ML
250 INJECTION, SOLUTION INTRAMUSCULAR
Status: DISCONTINUED | OUTPATIENT
Start: 2024-09-27 | End: 2024-09-28 | Stop reason: HOSPADM

## 2024-09-27 RX ORDER — SODIUM CHLORIDE, SODIUM LACTATE, POTASSIUM CHLORIDE, CALCIUM CHLORIDE 600; 310; 30; 20 MG/100ML; MG/100ML; MG/100ML; MG/100ML
INJECTION, SOLUTION INTRAVENOUS CONTINUOUS PRN
Status: DISCONTINUED | OUTPATIENT
Start: 2024-09-27 | End: 2024-09-28 | Stop reason: HOSPADM

## 2024-09-27 RX ORDER — MISOPROSTOL 200 UG/1
400 TABLET ORAL
Status: DISCONTINUED | OUTPATIENT
Start: 2024-09-27 | End: 2024-09-28 | Stop reason: HOSPADM

## 2024-09-27 RX ORDER — FENTANYL/ROPIVACAINE/NS/PF 2MCG/ML-.1
PLASTIC BAG, INJECTION (ML) EPIDURAL
Status: DISCONTINUED | OUTPATIENT
Start: 2024-09-27 | End: 2024-09-28 | Stop reason: HOSPADM

## 2024-09-27 RX ORDER — KETOROLAC TROMETHAMINE 30 MG/ML
30 INJECTION, SOLUTION INTRAMUSCULAR; INTRAVENOUS
Status: DISCONTINUED | OUTPATIENT
Start: 2024-09-27 | End: 2024-10-01 | Stop reason: HOSPADM

## 2024-09-27 RX ORDER — OXYTOCIN 10 [USP'U]/ML
10 INJECTION, SOLUTION INTRAMUSCULAR; INTRAVENOUS
Status: DISCONTINUED | OUTPATIENT
Start: 2024-09-27 | End: 2024-09-28 | Stop reason: HOSPADM

## 2024-09-27 RX ORDER — TERBUTALINE SULFATE 1 MG/ML
0.25 INJECTION, SOLUTION SUBCUTANEOUS
Status: DISCONTINUED | OUTPATIENT
Start: 2024-09-27 | End: 2024-09-28 | Stop reason: HOSPADM

## 2024-09-27 RX ORDER — FENTANYL CITRATE 50 UG/ML
50 INJECTION, SOLUTION INTRAMUSCULAR; INTRAVENOUS EVERY 30 MIN PRN
Status: DISCONTINUED | OUTPATIENT
Start: 2024-09-27 | End: 2024-09-28 | Stop reason: HOSPADM

## 2024-09-27 RX ORDER — NALOXONE HYDROCHLORIDE 0.4 MG/ML
0.2 INJECTION, SOLUTION INTRAMUSCULAR; INTRAVENOUS; SUBCUTANEOUS
Status: DISCONTINUED | OUTPATIENT
Start: 2024-09-27 | End: 2024-09-28 | Stop reason: HOSPADM

## 2024-09-27 RX ORDER — CITRIC ACID/SODIUM CITRATE 334-500MG
30 SOLUTION, ORAL ORAL
Status: DISCONTINUED | OUTPATIENT
Start: 2024-09-27 | End: 2024-09-28 | Stop reason: HOSPADM

## 2024-09-27 RX ORDER — METOCLOPRAMIDE 10 MG/1
10 TABLET ORAL EVERY 6 HOURS PRN
Status: DISCONTINUED | OUTPATIENT
Start: 2024-09-27 | End: 2024-09-28 | Stop reason: HOSPADM

## 2024-09-27 RX ORDER — FENTANYL CITRATE-0.9 % NACL/PF 10 MCG/ML
100 PLASTIC BAG, INJECTION (ML) INTRAVENOUS EVERY 5 MIN PRN
Status: DISCONTINUED | OUTPATIENT
Start: 2024-09-27 | End: 2024-09-28 | Stop reason: HOSPADM

## 2024-09-27 RX ORDER — IBUPROFEN 800 MG/1
800 TABLET, FILM COATED ORAL
Status: DISCONTINUED | OUTPATIENT
Start: 2024-09-27 | End: 2024-10-01 | Stop reason: HOSPADM

## 2024-09-27 RX ADMIN — SODIUM CHLORIDE, POTASSIUM CHLORIDE, SODIUM LACTATE AND CALCIUM CHLORIDE 500 ML: 600; 310; 30; 20 INJECTION, SOLUTION INTRAVENOUS at 20:07

## 2024-09-27 RX ADMIN — SODIUM CHLORIDE, POTASSIUM CHLORIDE, SODIUM LACTATE AND CALCIUM CHLORIDE: 600; 310; 30; 20 INJECTION, SOLUTION INTRAVENOUS at 10:00

## 2024-09-27 RX ADMIN — Medication 1 MILLI-UNITS/MIN: at 10:11

## 2024-09-27 RX ADMIN — BUPIVACAINE HYDROCHLORIDE 5 ML: 2.5 INJECTION, SOLUTION EPIDURAL; INFILTRATION; INTRACAUDAL at 21:28

## 2024-09-27 RX ADMIN — SODIUM CHLORIDE, POTASSIUM CHLORIDE, SODIUM LACTATE AND CALCIUM CHLORIDE: 600; 310; 30; 20 INJECTION, SOLUTION INTRAVENOUS at 20:33

## 2024-09-27 RX ADMIN — Medication: at 21:24

## 2024-09-27 ASSESSMENT — ACTIVITIES OF DAILY LIVING (ADL)
ADLS_ACUITY_SCORE: 18
ADLS_ACUITY_SCORE: 35
ADLS_ACUITY_SCORE: 18

## 2024-09-27 NOTE — PROGRESS NOTES
Patient reports fairly frequent contractions that are uncomfortable.  Overall she is becoming a bit frustrated and is ready to be done with this pregnancy.  She notes good fetal movement, denies LOF or bleeding.  She denies headaches, blurred vision, RUQ pain or LE edema.  Her cervix is 2 cm dilated, head is low but cervix is very posterior, soft.  I gave her a Vizcaino score of 7 today.  We discussed elective IOL and a wong in  rates around 41 weeks gestation.  She would like to be induced tomorrow.  Concerns: desires induction  No HA, RUQ pain, N/V, visual changes.  Cervix is 2/75%/-1, posterior, soft.  Cephalic position confirmed by Leopold maneuvers and cervical exam.  Discussed indications, risks, complications and failure rate of inductions.  Discussed signs of labor and when to call or come in.  Discussed kick counts and fetal movement.  Reportable signs and symptoms discussed.  Labor precautions discussed.  Plan IOL attempt tomorrow.    Vale Pringle MD

## 2024-09-27 NOTE — PROGRESS NOTES
Patient notes stronger contractions for about the past 2 hours, breathing through them.  Used nitrous for a bit but stopped due to feeling lightheaded.  Reports 8/10 pain with contractions currently.  SVE now 3/90/0, cervix much more anterior.  Pitocin was reduced from 8 to 4 due to tachysystole.    Discussed options with patient including stopping pitocin and allowing a wash-out period to see what her body does versus continuation of pitocin with AROM and likely epidural.  Patient would like to try stopping pitocin, and if she remains in active labor, will consider epidural and AROM along with restarting pitocin if needed for augmentation.  If contractions cease, will consider discharge.  RN to update with contraction pattern and patient's tolerance of contractions in approximately 1 hour.    MARCELL Pringle MD

## 2024-09-27 NOTE — PATIENT INSTRUCTIONS
"Weeks 40 to 41 of Your Pregnancy: Care Instructions  By week 40, you've reached your due date. Your baby could be coming any day. Most babies born after their due dates are healthy. But you may have tests to make sure everything is okay. If you feel stressed, you could try a meditation exercise, such as guided imagery. It may help you relax.    If you are past 41 weeks, your doctor may measure the amount of fluid that surrounds your baby. They may also test your baby's movement and heart rate.   If you don't start labor on your own by 41 or 42 weeks, your doctor may want to start (induce) labor. If there are other concerns, your doctor may talk to you about a .   To start (induce) your labor, your doctor may do any of these things.    Place a narrow tube with a small balloon on the end (balloon catheter) into your cervix.  The doctor inflates the balloon. This helps your cervix open (dilate).     Sweep the membranes (separate the lining of the amniotic sac from the uterus).  This helps the uterus make a chemical that can start contractions.     \"Break your water\" (rupture the amniotic sac).  This may be done if your cervix has started to open.     Use medicines.  They may be used to soften the cervix or start contractions.   How to do guided imagery    Close your eyes, and take a few deep breaths. Picture a peaceful setting, such as a beach or a meadow. Add a winding path. Follow the path until you feel more and more relaxed.   Take a few minutes to breathe slowly and feel the calm. When you are ready, slowly take yourself back to the present. Count to 3, and open your eyes.   Follow-up care is a key part of your treatment and safety. Be sure to make and go to all appointments, and call your doctor if you are having problems. It's also a good idea to know your test results and keep a list of the medicines you take.  Where can you learn more?  Go to https://www.healthwise.net/patiented  Enter T922 in the " "search box to learn more about \"Weeks 40 to 41 of Your Pregnancy: Care Instructions.\"  Current as of: July 10, 2023  Content Version: 14.2 2024 [employer].   Care instructions adapted under license by your healthcare professional. If you have questions about a medical condition or this instruction, always ask your healthcare professional. Healthwise, Incorporated disclaims any warranty or liability for your use of this information.    Labor Induction  What You Need to Know  What is labor induction?  In most cases, it is best to go into labor naturally. When labor does not start on its own, we may use medicine or other methods to start (induce) labor. This is called induction, which:  Helps the uterus contract  Thins, softens and opens the cervix (opening of the uterus)  When is induction used?  There are two types of induction.  Medical induction may be done to protect the health of the parent or baby if:  There are medical concerns for you or your baby.  You haven't had your baby by 41 weeks.  Induction for non-medical reasons may be done at 39 weeks or later if:  You live far from the hospital.  You've been having contractions for many days.  You've given birth quickly in the past.   We will not perform an induction for non-medical reasons before 39 weeks. Studies show that babies born before 39 weeks may struggle with breathing, feeding, sleeping and staying warm. They are more likely to have health problems and may need to stay in the hospital longer. If we start your labor for medical reasons, the benefits will outweigh these risks. We will talk to you about your risks, benefits and alternatives (other options) before we start your labor.  How is induction done?  We may start your labor by doing one or more of the following:  We may need to help your cervix soften and open (sometimes called \"ripening\") to prepare it for labor. There are two ways to do this:  Medicines--these may be in the form of " "pills that you swallow or medicines that are put into the vagina next to the cervix.  Mechanical--using either a single or double balloon. These are small balloons that are attached to tubes that go up inside the cervix. The balloons are then filled with water to put pressure on the cervix and help the cervix soften and open. Depending on the type of balloon used, you may be allowed to go home after it is placed.  After your cervix is ready:  We may give you medicine through an IV (a small tube placed in your vein). This medicine is called Pitocin. It helps the muscles of your uterus to contract.  We may make a small opening in your bag of water (the sac around your baby). This is called an amniotomy. Sometimes called \"breaking the water\". It may help your uterus contract and your cervix open.  What might happen if my labor is induced?  Some of this depends on how your labor is started and how your body responds. Your labor may be more complicated. You and your baby may need more medical treatments. In general:  You may not go into labor right away. If so, we may send you home with follow-up instructions.  It will be important to monitor you and your baby during the induction.  You may not be able to move around during labor. You will have two belts with monitors attached to your belly. These allow the nurse to watch your contractions and your baby's heart rate.  Your labor may take longer than if you went into labor on your own. It might take more than one day.  If you need cervical ripening, it is important to know that it may be many hours (even days) until delivery happens.  Cervical ripening can be slow and require several doses before your body is ready to labor.  A long labor may increase the risk of infection in mother and baby.  Your labor may not progress as planned. This could lead to a  birth.  Can I plan the date of my delivery?  After 39 weeks, you may ask about planning your delivery date. " This is only an option if your body--and your baby--are ready. To find out, we will check your cervix and your baby's heart rate.   If you are ready to be induced, we will give you a date and time to come to the hospital. If many patients are in labor that day, we may need to start your labor at another time.  If you are not ready, we will not start your labor. It will be safer for your baby to come on its own.  What else do I need to know?  Before you have an induction, make sure you understand the reasons, risks and benefits. Ask your doctor or nurse-midwife:  Why do I need to be induced?  What are the risks to my baby?  How will you start my labor?  How will you know if my baby is ready to be born?  How will you know if my body is ready to give birth?  Where can I get more information?  To learn more about induction, you may visit these websites:  The American College of Nurse-Midwives:  www.mymidwife.org  The American College of Obstetricians and Gynecologists: www.acog.org  Childbirth Connection:  www.childbirthconnection.org  March of Dimes: www.marchofdimes.com  Association of Women's Health, Obstetrics, and  Nursing  www.ayzufy3gmq.org/go-the-full-40&#047;  For informational purposes only. Not to replace the advice of your health care provider. Copyright   2008 [employer]. All rights reserved. Clinically reviewed by the  System Operations Leadership team. [employer] [id number] - REV .

## 2024-09-27 NOTE — H&P
[facility]    History and Physical  Obstetrics and Gynecology     Date of Admission:  2024    Assessment & Plan   Brainna Alarcon is a 29 year old female who presents with plan for elective IOL for postdates with a favorable cervix.  ASSESSMENT:   IUP @ 40w4d for induction of labor.  Indication postdates/elective.  NST reactive.  Category  I    PLAN:   Admit - see IP orders  Labor induction with Pitocin  Pain medication PRN  Anticipate     Vale Pringle MD    History of Present Illness   Brianna Alarcon is a 29 year old female  40w4d  Estimated Date of Delivery: Sep 23, 2024 is calculated from No LMP recorded (lmp unknown). Patient is pregnant. is [facility]  for induction of labor.  Indication postdates/elective.    PRENATAL COURSE  Prenatal course was essentially uncomplicated      Recent Labs   Lab Test 24  0944   AS Negative     Rhogam not indicated   Recent Labs   Lab Test 24  1504   HEPBANG Nonreactive   HIAGAB Nonreactive   RUQIGG Positive       Past Medical History    I have reviewed this patient's medical history and updated it with pertinent information if needed.   Past Medical History:   Diagnosis Date    Anxiety     Controlled substance agreement signed 2019    Depressive disorder     IBS (irritable bowel syndrome)     Uncomplicated asthma        Past Surgical History   I have reviewed this patient's surgical history and updated it with pertinent information if needed.  Past Surgical History:   Procedure Laterality Date    COLONOSCOPY      x2    WISDOM TOOTH EXTRACTION         Prior to Admission Medications   Prior to Admission Medications   Prescriptions Last Dose Informant Patient Reported? Taking?   Prenatal Vit-Fe Fumarate-FA (PRENATAL MULTIVITAMIN  PLUS IRON) 27-1 MG TABS   Yes No   Sig: Take by mouth daily   famotidine (PEPCID) 20 MG tablet   No No   Sig: Take 1 tablet (20 mg) by mouth 2 times daily   ondansetron  (ZOFRAN) 4 MG tablet 9/26/2024  No Yes   Sig: Take 1 tablet (4 mg) by mouth every 8 hours as needed for nausea      Facility-Administered Medications: None     Allergies   Allergies   Allergen Reactions    Ajovy [Fremanezumab-Vfrm] Dermatitis    Aspirin Unknown     Drowsy, blurry vision    Oxycodone-Acetaminophen Nausea     Any pain meds make her sick and cause nausea    Trimethoprim Hives    Sulfamethoxazole Rash    Sulfamethoxazole-Trimethoprim [Sulfamethoxazole-Trimethoprim] Rash and Hives       Social History   I have reviewed this patient's social history and updated it with pertinent information if needed. Brianna Alarcon  reports that she has never smoked. She has never been exposed to tobacco smoke. She has never used smokeless tobacco. She reports that she does not currently use alcohol. She reports that she does not use drugs.    Family History   I have reviewed this patient's family history and updated it with pertinent information if needed.   Family History   Adopted: Yes   Problem Relation Age of Onset    Hypertension Mother     Depression Mother     Anxiety Disorder Mother     Mental Illness Mother         Bi polar    Substance Abuse Mother     Unknown/Adopted Mother     No Known Problems Father        Immunization History   Immunization History   Administered Date(s) Administered    DTaP, Unspecified 1995, 06/03/1996, 07/29/1996, 01/08/1997, 08/29/2000    HIB, Unspecified 1995, 06/03/1996, 07/29/1996, 12/11/1996    HepB, Unspecified 07/29/1996, 01/08/1997, 06/09/1997    Influenza Vaccine >6 months,quad, PF 03/05/2024    Influenza, Split Virus, Trivalent, Pf (Fluzone\Fluarix) 09/12/2024    MMR 12/11/1996, 08/29/2000    OPV, trivalent, live 1995, 06/03/1996, 07/29/1996, 08/29/2000    TD,PF 7+ (Tenivac) 11/08/2021    TDAP (Adacel,Boostrix) 09/24/2010, 07/18/2024    Varicella 08/14/2006       Physical Exam   Temp: 98.3  F (36.8  C) Temp src: Oral BP: 130/80 Pulse: 69   Resp: 18 SpO2:  98 % O2 Device: None (Room air)    Vital Signs with Ranges  Temp:  [97.8  F (36.6  C)-98.3  F (36.8  C)] 98.3  F (36.8  C)  Pulse:  [69-75] 69  Resp:  [18] 18  BP: (118-139)/(65-85) 130/80  SpO2:  [96 %-98 %] 98 %    Abdomen: gravid, single vertex fetus, non-tender, EFW <4500 g  Cervical Exam: 2/75%/-1, very posterior on admission    Fetal Heart Tones: cat I with accels, no decels  TOCO:   external monitor, frequency q 2 minutes, Strength strong, and Pitocin @ 4 mU    Constitutional: healthy, alert, and active   Respiratory: breathing comfortably, no cough  Cardiovascular: no edema  Skin/Extremites: normal skin color, texture, turgor  Neurologic: alert and oriented x3  Neuropsychiatric: normal mood and affect

## 2024-09-27 NOTE — PROGRESS NOTES
Pt started feeling her contractions stronger. Her contractions were mod per palpation. Her vaginal exam remains unchanged. Her pitocin is at 8 mu. Dr Pringle was updated on pt's not labor status (unchanged cervical exam, category one fetal tracing and regular moderate palpating contractions.). Dr Pringle planned to come in to evaluate pt at 1800.

## 2024-09-27 NOTE — PLAN OF CARE
Goal Outcome Evaluation:         Pt has been labor induced lose dose pitocin. She is  one and 40.4 weeks. She denies having any problem with the pregnancy. She has been mona regularly, mild palpating. She has a category one fetal tracing.

## 2024-09-28 LAB — PLATELET # BLD AUTO: 314 10E3/UL (ref 150–450)

## 2024-09-28 PROCEDURE — 250N000011 HC RX IP 250 OP 636: Performed by: ANESTHESIOLOGY

## 2024-09-28 PROCEDURE — 250N000013 HC RX MED GY IP 250 OP 250 PS 637: Performed by: OBSTETRICS & GYNECOLOGY

## 2024-09-28 PROCEDURE — 258N000003 HC RX IP 258 OP 636: Performed by: OBSTETRICS & GYNECOLOGY

## 2024-09-28 PROCEDURE — 36415 COLL VENOUS BLD VENIPUNCTURE: CPT | Performed by: OBSTETRICS & GYNECOLOGY

## 2024-09-28 PROCEDURE — 250N000011 HC RX IP 250 OP 636: Performed by: OBSTETRICS & GYNECOLOGY

## 2024-09-28 PROCEDURE — 250N000009 HC RX 250: Performed by: ANESTHESIOLOGY

## 2024-09-28 PROCEDURE — 710N000009 HC RECOVERY PHASE 1, LEVEL 1, PER MIN: Performed by: OBSTETRICS & GYNECOLOGY

## 2024-09-28 PROCEDURE — 370N000017 HC ANESTHESIA TECHNICAL FEE, PER MIN: Performed by: OBSTETRICS & GYNECOLOGY

## 2024-09-28 PROCEDURE — 59514 CESAREAN DELIVERY ONLY: CPT | Mod: 80 | Performed by: FAMILY MEDICINE

## 2024-09-28 PROCEDURE — 120N000001 HC R&B MED SURG/OB

## 2024-09-28 PROCEDURE — 258N000003 HC RX IP 258 OP 636: Performed by: FAMILY MEDICINE

## 2024-09-28 PROCEDURE — 360N000076 HC SURGERY LEVEL 3, PER MIN: Performed by: OBSTETRICS & GYNECOLOGY

## 2024-09-28 PROCEDURE — 999N000249 HC STATISTIC C-SECTION ON UNIT

## 2024-09-28 PROCEDURE — 258N000003 HC RX IP 258 OP 636: Performed by: ANESTHESIOLOGY

## 2024-09-28 PROCEDURE — 250N000009 HC RX 250: Performed by: OBSTETRICS & GYNECOLOGY

## 2024-09-28 PROCEDURE — 272N000001 HC OR GENERAL SUPPLY STERILE: Performed by: OBSTETRICS & GYNECOLOGY

## 2024-09-28 RX ORDER — DEXTROSE, SODIUM CHLORIDE, SODIUM LACTATE, POTASSIUM CHLORIDE, AND CALCIUM CHLORIDE 5; .6; .31; .03; .02 G/100ML; G/100ML; G/100ML; G/100ML; G/100ML
INJECTION, SOLUTION INTRAVENOUS CONTINUOUS
Status: DISCONTINUED | OUTPATIENT
Start: 2024-09-28 | End: 2024-10-01 | Stop reason: HOSPADM

## 2024-09-28 RX ORDER — PROCHLORPERAZINE MALEATE 10 MG
10 TABLET ORAL EVERY 6 HOURS PRN
Status: DISCONTINUED | OUTPATIENT
Start: 2024-09-28 | End: 2024-10-01 | Stop reason: HOSPADM

## 2024-09-28 RX ORDER — OXYTOCIN/0.9 % SODIUM CHLORIDE 30/500 ML
340 PLASTIC BAG, INJECTION (ML) INTRAVENOUS CONTINUOUS PRN
Status: DISCONTINUED | OUTPATIENT
Start: 2024-09-28 | End: 2024-10-01 | Stop reason: HOSPADM

## 2024-09-28 RX ORDER — FENTANYL CITRATE 50 UG/ML
INJECTION, SOLUTION INTRAMUSCULAR; INTRAVENOUS PRN
Status: DISCONTINUED | OUTPATIENT
Start: 2024-09-28 | End: 2024-09-28

## 2024-09-28 RX ORDER — SIMETHICONE 80 MG
80 TABLET,CHEWABLE ORAL 4 TIMES DAILY PRN
Status: DISCONTINUED | OUTPATIENT
Start: 2024-09-28 | End: 2024-10-01 | Stop reason: HOSPADM

## 2024-09-28 RX ORDER — AMOXICILLIN 250 MG
1 CAPSULE ORAL 2 TIMES DAILY
Status: DISCONTINUED | OUTPATIENT
Start: 2024-09-28 | End: 2024-10-01 | Stop reason: HOSPADM

## 2024-09-28 RX ORDER — TRANEXAMIC ACID 10 MG/ML
1 INJECTION, SOLUTION INTRAVENOUS EVERY 30 MIN PRN
Status: DISCONTINUED | OUTPATIENT
Start: 2024-09-28 | End: 2024-10-01 | Stop reason: HOSPADM

## 2024-09-28 RX ORDER — METHYLERGONOVINE MALEATE 0.2 MG/ML
200 INJECTION INTRAVENOUS
Status: DISCONTINUED | OUTPATIENT
Start: 2024-09-28 | End: 2024-10-01 | Stop reason: HOSPADM

## 2024-09-28 RX ORDER — BISACODYL 10 MG
10 SUPPOSITORY, RECTAL RECTAL DAILY PRN
Status: DISCONTINUED | OUTPATIENT
Start: 2024-09-30 | End: 2024-10-01 | Stop reason: HOSPADM

## 2024-09-28 RX ORDER — CEFAZOLIN SODIUM/WATER 2 G/20 ML
2 SYRINGE (ML) INTRAVENOUS
Status: COMPLETED | OUTPATIENT
Start: 2024-09-28 | End: 2024-09-28

## 2024-09-28 RX ORDER — ONDANSETRON 4 MG/1
4 TABLET, ORALLY DISINTEGRATING ORAL EVERY 6 HOURS PRN
Status: DISCONTINUED | OUTPATIENT
Start: 2024-09-28 | End: 2024-10-01 | Stop reason: HOSPADM

## 2024-09-28 RX ORDER — ACETAMINOPHEN 325 MG/1
975 TABLET ORAL ONCE
Status: COMPLETED | OUTPATIENT
Start: 2024-09-28 | End: 2024-09-28

## 2024-09-28 RX ORDER — CARBOPROST TROMETHAMINE 250 UG/ML
250 INJECTION, SOLUTION INTRAMUSCULAR
Status: DISCONTINUED | OUTPATIENT
Start: 2024-09-28 | End: 2024-10-01 | Stop reason: HOSPADM

## 2024-09-28 RX ORDER — MISOPROSTOL 200 UG/1
800 TABLET ORAL
Status: DISCONTINUED | OUTPATIENT
Start: 2024-09-28 | End: 2024-10-01 | Stop reason: HOSPADM

## 2024-09-28 RX ORDER — NALOXONE HYDROCHLORIDE 0.4 MG/ML
0.4 INJECTION, SOLUTION INTRAMUSCULAR; INTRAVENOUS; SUBCUTANEOUS
Status: DISCONTINUED | OUTPATIENT
Start: 2024-09-28 | End: 2024-10-01 | Stop reason: HOSPADM

## 2024-09-28 RX ORDER — LOPERAMIDE HCL 2 MG
4 CAPSULE ORAL
Status: DISCONTINUED | OUTPATIENT
Start: 2024-09-28 | End: 2024-09-28 | Stop reason: HOSPADM

## 2024-09-28 RX ORDER — LOPERAMIDE HCL 2 MG
2 CAPSULE ORAL
Status: DISCONTINUED | OUTPATIENT
Start: 2024-09-28 | End: 2024-09-28 | Stop reason: HOSPADM

## 2024-09-28 RX ORDER — LOPERAMIDE HCL 2 MG
2 CAPSULE ORAL
Status: DISCONTINUED | OUTPATIENT
Start: 2024-09-28 | End: 2024-10-01 | Stop reason: HOSPADM

## 2024-09-28 RX ORDER — OXYTOCIN/0.9 % SODIUM CHLORIDE 30/500 ML
100-340 PLASTIC BAG, INJECTION (ML) INTRAVENOUS CONTINUOUS PRN
Status: DISCONTINUED | OUTPATIENT
Start: 2024-09-28 | End: 2024-10-01 | Stop reason: HOSPADM

## 2024-09-28 RX ORDER — MORPHINE SULFATE 1 MG/ML
INJECTION, SOLUTION EPIDURAL; INTRATHECAL; INTRAVENOUS PRN
Status: DISCONTINUED | OUTPATIENT
Start: 2024-09-28 | End: 2024-09-28

## 2024-09-28 RX ORDER — LIDOCAINE HCL/EPINEPHRINE/PF 2%-1:200K
VIAL (ML) INJECTION PRN
Status: DISCONTINUED | OUTPATIENT
Start: 2024-09-28 | End: 2024-09-28

## 2024-09-28 RX ORDER — CEFAZOLIN SODIUM/WATER 2 G/20 ML
2 SYRINGE (ML) INTRAVENOUS SEE ADMIN INSTRUCTIONS
Status: DISCONTINUED | OUTPATIENT
Start: 2024-09-28 | End: 2024-09-28 | Stop reason: HOSPADM

## 2024-09-28 RX ORDER — METOCLOPRAMIDE 10 MG/1
10 TABLET ORAL EVERY 6 HOURS PRN
Status: DISCONTINUED | OUTPATIENT
Start: 2024-09-28 | End: 2024-10-01 | Stop reason: HOSPADM

## 2024-09-28 RX ORDER — IBUPROFEN 800 MG/1
800 TABLET, FILM COATED ORAL EVERY 6 HOURS
Status: DISCONTINUED | OUTPATIENT
Start: 2024-09-29 | End: 2024-10-01 | Stop reason: HOSPADM

## 2024-09-28 RX ORDER — SODIUM CHLORIDE, SODIUM LACTATE, POTASSIUM CHLORIDE, CALCIUM CHLORIDE 600; 310; 30; 20 MG/100ML; MG/100ML; MG/100ML; MG/100ML
INJECTION, SOLUTION INTRAVENOUS CONTINUOUS
Status: DISCONTINUED | OUTPATIENT
Start: 2024-09-28 | End: 2024-09-28 | Stop reason: HOSPADM

## 2024-09-28 RX ORDER — OXYTOCIN/0.9 % SODIUM CHLORIDE 30/500 ML
340 PLASTIC BAG, INJECTION (ML) INTRAVENOUS CONTINUOUS PRN
Status: DISCONTINUED | OUTPATIENT
Start: 2024-09-28 | End: 2024-09-28 | Stop reason: HOSPADM

## 2024-09-28 RX ORDER — TRANEXAMIC ACID 10 MG/ML
1 INJECTION, SOLUTION INTRAVENOUS EVERY 30 MIN PRN
Status: DISCONTINUED | OUTPATIENT
Start: 2024-09-28 | End: 2024-09-28 | Stop reason: HOSPADM

## 2024-09-28 RX ORDER — MISOPROSTOL 200 UG/1
800 TABLET ORAL
Status: DISCONTINUED | OUTPATIENT
Start: 2024-09-28 | End: 2024-09-28 | Stop reason: HOSPADM

## 2024-09-28 RX ORDER — KETOROLAC TROMETHAMINE 30 MG/ML
INJECTION, SOLUTION INTRAMUSCULAR; INTRAVENOUS PRN
Status: DISCONTINUED | OUTPATIENT
Start: 2024-09-28 | End: 2024-09-28

## 2024-09-28 RX ORDER — OXYCODONE HYDROCHLORIDE 5 MG/1
5 TABLET ORAL EVERY 4 HOURS PRN
Status: DISCONTINUED | OUTPATIENT
Start: 2024-09-28 | End: 2024-10-01 | Stop reason: HOSPADM

## 2024-09-28 RX ORDER — HYDROCORTISONE 25 MG/G
CREAM TOPICAL 3 TIMES DAILY PRN
Status: DISCONTINUED | OUTPATIENT
Start: 2024-09-28 | End: 2024-10-01 | Stop reason: HOSPADM

## 2024-09-28 RX ORDER — NALOXONE HYDROCHLORIDE 0.4 MG/ML
0.2 INJECTION, SOLUTION INTRAMUSCULAR; INTRAVENOUS; SUBCUTANEOUS
Status: DISCONTINUED | OUTPATIENT
Start: 2024-09-28 | End: 2024-10-01 | Stop reason: HOSPADM

## 2024-09-28 RX ORDER — ONDANSETRON 2 MG/ML
4 INJECTION INTRAMUSCULAR; INTRAVENOUS EVERY 6 HOURS PRN
Status: DISCONTINUED | OUTPATIENT
Start: 2024-09-28 | End: 2024-10-01 | Stop reason: HOSPADM

## 2024-09-28 RX ORDER — OXYTOCIN 10 [USP'U]/ML
10 INJECTION, SOLUTION INTRAMUSCULAR; INTRAVENOUS
Status: DISCONTINUED | OUTPATIENT
Start: 2024-09-28 | End: 2024-09-28 | Stop reason: HOSPADM

## 2024-09-28 RX ORDER — OXYTOCIN 10 [USP'U]/ML
10 INJECTION, SOLUTION INTRAMUSCULAR; INTRAVENOUS
Status: DISCONTINUED | OUTPATIENT
Start: 2024-09-28 | End: 2024-10-01 | Stop reason: HOSPADM

## 2024-09-28 RX ORDER — MISOPROSTOL 200 UG/1
400 TABLET ORAL
Status: DISCONTINUED | OUTPATIENT
Start: 2024-09-28 | End: 2024-10-01 | Stop reason: HOSPADM

## 2024-09-28 RX ORDER — CITRIC ACID/SODIUM CITRATE 334-500MG
30 SOLUTION, ORAL ORAL
Status: COMPLETED | OUTPATIENT
Start: 2024-09-28 | End: 2024-09-28

## 2024-09-28 RX ORDER — LIDOCAINE 40 MG/G
CREAM TOPICAL
Status: DISCONTINUED | OUTPATIENT
Start: 2024-09-28 | End: 2024-10-01 | Stop reason: HOSPADM

## 2024-09-28 RX ORDER — LIDOCAINE 40 MG/G
CREAM TOPICAL
Status: DISCONTINUED | OUTPATIENT
Start: 2024-09-28 | End: 2024-09-28 | Stop reason: HOSPADM

## 2024-09-28 RX ORDER — AMOXICILLIN 250 MG
2 CAPSULE ORAL 2 TIMES DAILY
Status: DISCONTINUED | OUTPATIENT
Start: 2024-09-28 | End: 2024-10-01 | Stop reason: HOSPADM

## 2024-09-28 RX ORDER — CHLOROPROCAINE HYDROCHLORIDE 30 MG/ML
INJECTION, SOLUTION EPIDURAL; INFILTRATION; INTRACAUDAL; PERINEURAL PRN
Status: DISCONTINUED | OUTPATIENT
Start: 2024-09-28 | End: 2024-09-28

## 2024-09-28 RX ORDER — ONDANSETRON 2 MG/ML
INJECTION INTRAMUSCULAR; INTRAVENOUS PRN
Status: DISCONTINUED | OUTPATIENT
Start: 2024-09-28 | End: 2024-09-28

## 2024-09-28 RX ORDER — CARBOPROST TROMETHAMINE 250 UG/ML
250 INJECTION, SOLUTION INTRAMUSCULAR
Status: DISCONTINUED | OUTPATIENT
Start: 2024-09-28 | End: 2024-09-28 | Stop reason: HOSPADM

## 2024-09-28 RX ORDER — SODIUM PHOSPHATE,MONO-DIBASIC 19G-7G/118
1 ENEMA (ML) RECTAL DAILY PRN
Status: DISCONTINUED | OUTPATIENT
Start: 2024-09-30 | End: 2024-10-01 | Stop reason: HOSPADM

## 2024-09-28 RX ORDER — METOCLOPRAMIDE HYDROCHLORIDE 5 MG/ML
10 INJECTION INTRAMUSCULAR; INTRAVENOUS EVERY 6 HOURS PRN
Status: DISCONTINUED | OUTPATIENT
Start: 2024-09-28 | End: 2024-10-01 | Stop reason: HOSPADM

## 2024-09-28 RX ORDER — KETOROLAC TROMETHAMINE 30 MG/ML
30 INJECTION, SOLUTION INTRAMUSCULAR; INTRAVENOUS EVERY 6 HOURS
Status: COMPLETED | OUTPATIENT
Start: 2024-09-28 | End: 2024-09-29

## 2024-09-28 RX ORDER — MISOPROSTOL 200 UG/1
400 TABLET ORAL
Status: DISCONTINUED | OUTPATIENT
Start: 2024-09-28 | End: 2024-09-28 | Stop reason: HOSPADM

## 2024-09-28 RX ORDER — ACETAMINOPHEN 325 MG/1
975 TABLET ORAL EVERY 6 HOURS
Status: DISCONTINUED | OUTPATIENT
Start: 2024-09-28 | End: 2024-10-01 | Stop reason: HOSPADM

## 2024-09-28 RX ORDER — OXYTOCIN/0.9 % SODIUM CHLORIDE 30/500 ML
PLASTIC BAG, INJECTION (ML) INTRAVENOUS CONTINUOUS PRN
Status: DISCONTINUED | OUTPATIENT
Start: 2024-09-28 | End: 2024-09-28

## 2024-09-28 RX ORDER — LOPERAMIDE HCL 2 MG
4 CAPSULE ORAL
Status: DISCONTINUED | OUTPATIENT
Start: 2024-09-28 | End: 2024-10-01 | Stop reason: HOSPADM

## 2024-09-28 RX ORDER — SODIUM CHLORIDE, SODIUM LACTATE, POTASSIUM CHLORIDE, CALCIUM CHLORIDE 600; 310; 30; 20 MG/100ML; MG/100ML; MG/100ML; MG/100ML
INJECTION, SOLUTION INTRAVENOUS CONTINUOUS
Status: DISCONTINUED | OUTPATIENT
Start: 2024-09-28 | End: 2024-10-01 | Stop reason: HOSPADM

## 2024-09-28 RX ORDER — METHYLERGONOVINE MALEATE 0.2 MG/ML
200 INJECTION INTRAVENOUS
Status: DISCONTINUED | OUTPATIENT
Start: 2024-09-28 | End: 2024-09-28 | Stop reason: HOSPADM

## 2024-09-28 RX ORDER — MODIFIED LANOLIN
OINTMENT (GRAM) TOPICAL
Status: DISCONTINUED | OUTPATIENT
Start: 2024-09-28 | End: 2024-10-01 | Stop reason: HOSPADM

## 2024-09-28 RX ADMIN — Medication 2 G: at 07:35

## 2024-09-28 RX ADMIN — SODIUM CHLORIDE, POTASSIUM CHLORIDE, SODIUM LACTATE AND CALCIUM CHLORIDE 500 ML: 600; 310; 30; 20 INJECTION, SOLUTION INTRAVENOUS at 07:00

## 2024-09-28 RX ADMIN — SODIUM CHLORIDE, SODIUM LACTATE, POTASSIUM CHLORIDE, CALCIUM CHLORIDE AND DEXTROSE MONOHYDRATE: 5; 600; 310; 30; 20 INJECTION, SOLUTION INTRAVENOUS at 11:21

## 2024-09-28 RX ADMIN — KETOROLAC TROMETHAMINE 30 MG: 30 INJECTION, SOLUTION INTRAMUSCULAR at 08:36

## 2024-09-28 RX ADMIN — OXYCODONE HYDROCHLORIDE 5 MG: 5 TABLET ORAL at 10:16

## 2024-09-28 RX ADMIN — FENTANYL CITRATE 100 MCG: 50 INJECTION INTRAMUSCULAR; INTRAVENOUS at 07:46

## 2024-09-28 RX ADMIN — Medication 340 ML/HR: at 07:59

## 2024-09-28 RX ADMIN — LIDOCAINE HYDROCHLORIDE,EPINEPHRINE BITARTRATE 5 ML: 20; .005 INJECTION, SOLUTION EPIDURAL; INFILTRATION; INTRACAUDAL; PERINEURAL at 07:34

## 2024-09-28 RX ADMIN — METOCLOPRAMIDE 10 MG: 5 INJECTION, SOLUTION INTRAMUSCULAR; INTRAVENOUS at 23:12

## 2024-09-28 RX ADMIN — ACETAMINOPHEN 975 MG: 325 TABLET ORAL at 14:48

## 2024-09-28 RX ADMIN — CHLOROPROCAINE HYDROCHLORIDE 5 ML: 30 INJECTION, SOLUTION EPIDURAL; INFILTRATION; INTRACAUDAL; PERINEURAL at 07:51

## 2024-09-28 RX ADMIN — CHLOROPROCAINE HYDROCHLORIDE 5 ML: 30 INJECTION, SOLUTION EPIDURAL; INFILTRATION; INTRACAUDAL; PERINEURAL at 07:48

## 2024-09-28 RX ADMIN — ONDANSETRON 4 MG: 2 INJECTION INTRAMUSCULAR; INTRAVENOUS at 05:57

## 2024-09-28 RX ADMIN — SENNOSIDES AND DOCUSATE SODIUM 1 TABLET: 8.6; 5 TABLET ORAL at 21:05

## 2024-09-28 RX ADMIN — SODIUM CHLORIDE, POTASSIUM CHLORIDE, SODIUM LACTATE AND CALCIUM CHLORIDE: 600; 310; 30; 20 INJECTION, SOLUTION INTRAVENOUS at 04:09

## 2024-09-28 RX ADMIN — KETOROLAC TROMETHAMINE 30 MG: 30 INJECTION, SOLUTION INTRAMUSCULAR at 14:48

## 2024-09-28 RX ADMIN — LIDOCAINE HYDROCHLORIDE,EPINEPHRINE BITARTRATE 5 ML: 20; .005 INJECTION, SOLUTION EPIDURAL; INFILTRATION; INTRACAUDAL; PERINEURAL at 07:21

## 2024-09-28 RX ADMIN — TRANEXAMIC ACID 1 G: 10 INJECTION, SOLUTION INTRAVENOUS at 06:52

## 2024-09-28 RX ADMIN — Medication 2 MG: at 07:59

## 2024-09-28 RX ADMIN — KETOROLAC TROMETHAMINE 30 MG: 30 INJECTION, SOLUTION INTRAMUSCULAR at 21:05

## 2024-09-28 RX ADMIN — PHENYLEPHRINE HYDROCHLORIDE 0.4 MCG/KG/MIN: 10 INJECTION INTRAVENOUS at 07:43

## 2024-09-28 RX ADMIN — ACETAMINOPHEN 975 MG: 325 TABLET ORAL at 07:05

## 2024-09-28 RX ADMIN — LIDOCAINE HYDROCHLORIDE,EPINEPHRINE BITARTRATE 5 ML: 20; .005 INJECTION, SOLUTION EPIDURAL; INFILTRATION; INTRACAUDAL; PERINEURAL at 07:40

## 2024-09-28 RX ADMIN — SODIUM CITRATE AND CITRIC ACID MONOHYDRATE 30 ML: 500; 334 SOLUTION ORAL at 07:06

## 2024-09-28 RX ADMIN — ACETAMINOPHEN 975 MG: 325 TABLET ORAL at 21:05

## 2024-09-28 RX ADMIN — Medication: at 03:24

## 2024-09-28 RX ADMIN — ONDANSETRON 8 MG: 2 INJECTION INTRAMUSCULAR; INTRAVENOUS at 07:41

## 2024-09-28 RX ADMIN — AZITHROMYCIN MONOHYDRATE 500 MG: 500 INJECTION, POWDER, LYOPHILIZED, FOR SOLUTION INTRAVENOUS at 07:35

## 2024-09-28 ASSESSMENT — ACTIVITIES OF DAILY LIVING (ADL)
ADLS_ACUITY_SCORE: 18
ADLS_ACUITY_SCORE: 22
ADLS_ACUITY_SCORE: 22
ADLS_ACUITY_SCORE: 19
ADLS_ACUITY_SCORE: 18
ADLS_ACUITY_SCORE: 22
ADLS_ACUITY_SCORE: 22
ADLS_ACUITY_SCORE: 18
ADLS_ACUITY_SCORE: 22
ADLS_ACUITY_SCORE: 18
ADLS_ACUITY_SCORE: 18

## 2024-09-28 NOTE — PLAN OF CARE
Problem: Labor  Goal: Hemostasis  Outcome: Progressing  Goal: Stable Fetal Wellbeing  Outcome: Progressing  Intervention: Promote and Monitor Fetal Wellbeing  Recent Flowsheet Documentation  Taken 2024 1545 by Janelle Garcia RN  Fetal Wellbeing Promotion: fetal heart rate monitored  Taken 2024 1541 by Janelle Garcia RN  Body Position:   position changed independently   supine, head elevated  Goal: Effective Progression to Delivery  Outcome: Progressing  Goal: Absence of Infection Signs and Symptoms  Outcome: Progressing  Intervention: Prevent or Manage Infection  Recent Flowsheet Documentation  Taken 2024 1545 by Janelle Garcia RN  Infection Prevention:   hand hygiene promoted   rest/sleep promoted   single patient room provided  Infection Management: aseptic technique maintained  Goal: Acceptable Pain Control  Outcome: Progressing  Intervention: Support Labor Pain Coping and Management  Recent Flowsheet Documentation  Taken 2024 1545 by Janelle Garcia RN  Sensory Stimulation Regulation:   care clustered   lighting decreased  Goal: Normal Uterine Contraction Pattern  Outcome: Progressing  Intervention: Monitor and Manage Uterine Activity  Recent Flowsheet Documentation  Taken 2024 1545 by Janelle Garcia RN  Fluid/Electrolyte Management: fluids provided       Shift Note 24 [id number]      40/4  admit 29 yr old Dr. Pringle pt. IOL for postdates. Pit 2 minesh units @ . VSS. R PIV running LR @ 50 ml/hr. SVE  @  3.5/90/0. EFM, Cat 1, cxtn mod q 2-6 min. AROM  @ , clears. Epidural @ . Spouse Gino & family members supportive at bedside. Baby boy Tyler, breast, Dr. Pringle, yes to Vit K & Erythromycin, no to Hep B. Continue towards delivery.

## 2024-09-28 NOTE — ANESTHESIA PREPROCEDURE EVALUATION
Anesthesia Pre-Procedure Evaluation    Patient: Brianna Alarcon   MRN: [id number] : 1995        Procedure :           Past Medical History:   Diagnosis Date    Anxiety     Controlled substance agreement signed 2019    Depressive disorder     IBS (irritable bowel syndrome)     Uncomplicated asthma       Past Surgical History:   Procedure Laterality Date    COLONOSCOPY      x2    WISDOM TOOTH EXTRACTION        Allergies   Allergen Reactions    Ajovy [Fremanezumab-Vfrm] Dermatitis    Aspirin Unknown     Drowsy, blurry vision    Oxycodone-Acetaminophen Nausea     Any pain meds make her sick and cause nausea    Trimethoprim Hives    Sulfamethoxazole Rash    Sulfamethoxazole-Trimethoprim [Sulfamethoxazole-Trimethoprim] Rash and Hives      Social History     Tobacco Use    Smoking status: Never     Passive exposure: Never    Smokeless tobacco: Never   Substance Use Topics    Alcohol use: Not Currently     Alcohol/week: 0.0 - 3.0 standard drinks of alcohol     Comment: Not since positive pregnancy test      Wt Readings from Last 1 Encounters:   24 85.3 kg (188 lb)        Anesthesia Evaluation            ROS/MED HX  ENT/Pulmonary:     (+)                      asthma                  Neurologic:       Cardiovascular:       METS/Exercise Tolerance:     Hematologic:  - neg hematologic  ROS     Musculoskeletal:  - neg musculoskeletal ROS     GI/Hepatic:       Renal/Genitourinary:  - neg Renal ROS     Endo:  - neg endo ROS     Psychiatric/Substance Use:       Infectious Disease:  - neg infectious disease ROS     Malignancy:  - neg malignancy ROS     Other:      (+) Possibly pregnant, , ,         Physical Exam    Airway        Mallampati: II   TM distance: > 3 FB   Neck ROM: full     Respiratory Devices and Support         Dental       (+) Completely normal teeth      Cardiovascular   cardiovascular exam normal       Rhythm and rate: regular and normal     Pulmonary   pulmonary exam normal       "          OUTSIDE LABS:  CBC:   Lab Results   Component Value Date    WBC 9.7 03/05/2024    WBC 6.0 11/10/2022    HGB 11.1 (L) 09/27/2024    HGB 11.0 (L) 08/29/2024    HCT 37.1 03/05/2024    HCT 40.6 11/10/2022     03/05/2024     11/10/2022     BMP:   Lab Results   Component Value Date     12/05/2023     11/10/2022    POTASSIUM 4.1 12/05/2023    POTASSIUM 4.6 11/10/2022    CHLORIDE 105 12/05/2023    CHLORIDE 107 11/10/2022    CO2 22 12/05/2023    CO2 21 (L) 11/10/2022    BUN 12.9 12/05/2023    BUN 11.1 11/10/2022    CR 0.69 12/05/2023    CR 0.83 11/10/2022    GLC 80 12/05/2023    GLC 84 11/10/2022     COAGS: No results found for: \"PTT\", \"INR\", \"FIBR\"  POC:   Lab Results   Component Value Date    HCG Negative 12/16/2021    HCGS Positive (A) 01/23/2024     HEPATIC:   Lab Results   Component Value Date    ALBUMIN 4.3 11/10/2022    PROTTOTAL 7.0 11/10/2022    ALT <5 (L) 11/10/2022    AST 21 11/10/2022    ALKPHOS 61 11/10/2022    BILITOTAL 0.4 11/10/2022     OTHER:   Lab Results   Component Value Date    ALEXANDRA 9.1 12/05/2023    TSH 1.32 11/10/2022       Anesthesia Plan    ASA Status:  2, emergent       Anesthesia Type: Epidural.              Consents    Anesthesia Plan(s) and associated risks, benefits, and realistic alternatives discussed. Questions answered and patient/representative(s) expressed understanding.     - Discussed:     - Discussed with:  Patient            Postoperative Care            Comments:    Other Comments: Plan for Urgent C section due to arrest of descent. Will use working epidural for anesthetic           Kari Thomas MD    I have reviewed the pertinent notes and labs in the chart from the past 30 days and (re)examined the patient.  Any updates or changes from those notes are reflected in this note.                  "

## 2024-09-28 NOTE — OP NOTE
Section Operative Report    Preoperative Diagnosis:   Arrest of descent    Postoperative Diagnosis:  Arrest of descent  Impacted fetal head    Procedure: Primary Low transverse  section with two layer closure    Uterine Extensions: Right inferior extension - 3 cm    Surgeon:  Domi Cuba MD     Assistant: Dr. Vale Pringle    Anesthesia: epidural    Delivery QBL (mL): 694 cc    Uterine Atony: None    Findings:   1. Amniotic fluid - Clear  2. Cephalic presentation  3. Normal uterus, tubes and ovaries bilaterally  4. Live male infant  5. Apgars of 8 at one minute, 9 at 5 minutes  6. Weight -  7 IBS 14.3 oz  7. Normal appearing placenta  8. Good hemostasis  9. Counts correct x 2    Drains: Allen catheter.    Pathology: None    Complications: None    Procedure:    Patient was met preoperatively where we discussed the procedure and the risks associated with the procedure.  She understood these to include but not limited to injury to adjacent organs including bowel, bladder, ureter, infection and bleeding. Understanding these risks her consents were signed.      She was brought to the operating room in stable condition.  After bolusing her epidural, fetal heart tones were checked and were stable. A allen catheter was placed. A fetal pillow was placed.  She was carefully prepped and draped in the typical sterile fashion for the procedure.  A timeout was then performed.      A Pfannenstiel skin incision was made and carried down to the rectus fascia which was incised in the midline and carried out bilaterally.  The superior and inferior aspects of the rectus fascia were elevated up and the underlying rectus muscles dissected off with sharp and blunt techniques.  The rectus muscles were then  at the midline and the peritoneum was identified and entered bluntly.  This incision was extended.  A bladder blade was introduced. The vesicouterine peritoneum was identified and a bladder flap was  created.  A low transverse uterine incision was made revealing clear amniotic fluid.  The baby's head was impacted. It was atraumatically elevated out of the pelvis. The head was then delivered. The remainder of the infant easily delivered. There was a spontaneous cry and therefore bulb suction was performed. The cord was clamped x 2 and cut and the infant handed off to waiting nursing personnel.    The placenta was then manually removed from the uterus.  The uterus was exteriorized, covered with a moist laparotomy sponge and cleared of all clots and debris. There was a 3 cm inferior extension. The bladder was below the uterine extension. The angle was identified and the extension closed in a running locking fashion to the hysterotomy. A second imbricating layer was placed.  The uterine incision was then closed with #1-chromic from both angles in a running locking fashion. A second imbricating layer was placed with #1 chromic. The incision was irrigated and noted to be hemostatic. The bladder flap was then reapproximated with 2-0 vicryl. The uterus was returned to the abdominopelvic cavity.  The pericolic gutters were cleared of all clots and debris.  The uterine incision was again inspected and noted to be hemostatic.  The peritoneum was now closed with 3.0 vicryl suture superiorly to inferiorly. The fascia was brought together with 0-PDS from both angles in a running nonlocking fashion and met at the midline. The subcutaneous tissues were irrigated, made hemostatic with use of electrocautery and brought together with 3-0 plain gut suture.  Skin was closed with 4.0 monocryl .  Patient tolerated this procedure well.  Needle, instrument and lap counts were correct x two.    Dr. Vale Pringle's assistance was requested 2/2 impacted fetal head. She assisted in retraction and visualization. She was present from skin until after closure of the fascia.     Increased EBL was from bleeding from the hysterotomy.     Domi  Raji Cuba MD

## 2024-09-28 NOTE — PROGRESS NOTES
Patient more comfortable with epidural, awake and able to participate with pushing again.  Fetus feels OP presentation with anterior fontanelle anteriorly to maternal right.  With pushing, had a 3 minute deceleration to 90's with recovery with repositioning to both sides.  [facility] Dr. Jimenes consulted and SVE performed.  Head is able to be mobilized in the pelvis, and with attempts at rotation, decelerations to 90's x2 were again observed.  Dr. Jimenes discussed options with the patient.  She has pushed for approximately 3 hours total, and labored down for about 2 hours.  Discussed that with continued pushing, the possibility exists that patient may need an urgent operative delivery.  Patient would like to avoid this and would like to have an elective  section at this point.  I have been at the bedside for 4.5 hours and will remain available to assist with the  section.    Vale Pringle MD

## 2024-09-28 NOTE — PLAN OF CARE
Data: Vital signs within normal limits. Postpartum checks within normal limits - see flow record. Patient eating and drinking normally. Patient's Joyce catheter removed at 17:58, adequate urine output, urine clear and pale yellow. Patient performing self cares, is able to care for infant and is breast and finger feeding every 2-3 hours. Dressing clean, dry, and intact.     Action: Adequate pain control noted by patient. Patient education done, see flow record.    Response: Positive attachment behaviors observed with infant. Patient's  and mother present this shift.     Plan: Continue current plan of care.     Problem: Postpartum ( Delivery)  Goal: Effective Urinary Elimination  Outcome: Progressing     Problem: Postpartum ( Delivery)  Goal: Optimal Pain Control and Function  Outcome: Progressing  Intervention: Prevent or Manage Pain  Recent Flowsheet Documentation  Taken 2024 1600 by Anay Rodriguez, RN  Perineal Care: perineum cleansed

## 2024-09-28 NOTE — H&P
[facility]    History and Physical       Date of Admission:  2024    History of Present Illness   Brianna Alarcon is a 29 year old female  40w 5d  Estimated Date of Delivery: Sep 23, 2024 presented for scheduled induction. She got to complete and pushed for 3 hours. Dr. Jimenes discussed operative delivery vs. C/S. She would like to proceed with C/S.     OB COMPLICATIONS:  None    Past Medical History    Past Medical History:   Diagnosis Date    Anxiety     Controlled substance agreement signed 2019    Depressive disorder     IBS (irritable bowel syndrome)     Uncomplicated asthma        Past Surgical History   Past Surgical History:   Procedure Laterality Date    COLONOSCOPY      x2    WISDOM TOOTH EXTRACTION         OB History    Para Term  AB Living   1 0 0 0 0 0   SAB IAB Ectopic Multiple Live Births   0 0 0 0 0      # Outcome Date GA Lbr Dale/2nd Weight Sex Type Anes PTL Lv   1 Current               Social History   Social History     Tobacco Use    Smoking status: Never     Passive exposure: Never    Smokeless tobacco: Never   Vaping Use    Vaping status: Never Used   Substance Use Topics    Alcohol use: Not Currently     Alcohol/week: 0.0 - 3.0 standard drinks of alcohol     Comment: Not since positive pregnancy test    Drug use: Never      Family History   Family History   Adopted: Yes   Problem Relation Age of Onset    Hypertension Mother     Depression Mother     Anxiety Disorder Mother     Mental Illness Mother         Bi polar    Substance Abuse Mother     Unknown/Adopted Mother     No Known Problems Father         Prior to Admission Medications   Prior to Admission Medications   Prescriptions Last Dose Informant Patient Reported? Taking?   Prenatal Vit-Fe Fumarate-FA (PRENATAL MULTIVITAMIN  PLUS IRON) 27-1 MG TABS   Yes No   Sig: Take by mouth daily   famotidine (PEPCID) 20 MG tablet   No No   Sig: Take 1 tablet (20 mg) by mouth 2 times daily    ondansetron (ZOFRAN) 4 MG tablet 2024  No Yes   Sig: Take 1 tablet (4 mg) by mouth every 8 hours as needed for nausea      Facility-Administered Medications: None     Allergies   Allergies   Allergen Reactions    Ajovy [Fremanezumab-Vfrm] Dermatitis    Aspirin Unknown     Drowsy, blurry vision    Oxycodone-Acetaminophen Nausea     Any pain meds make her sick and cause nausea    Trimethoprim Hives    Sulfamethoxazole Rash    Sulfamethoxazole-Trimethoprim [Sulfamethoxazole-Trimethoprim] Rash and Hives       Physical Exam   Vital Signs with Ranges  Temp:  [97.8  F (36.6  C)-98.7  F (37.1  C)] 98.7  F (37.1  C)  Pulse:  [69-75] 69  Resp:  [16-18] (P) 16  BP: (104-139)/(56-85) 138/78  SpO2:  [84 %-100 %] 100 %    Gen: no acute distress, resting comfortably   CV: acyanotic   Heart: regular rate and rhythm   Pulm: unlabored respirations, clear to ausculation bilaterally    Abd: gravid, soft, nontender   Extremities: soft, nontender     Recent Labs   Lab Test 24  0944   AS Negative     Recent Labs   Lab Test 24  1504   HEPBANG Nonreactive   HIAGAB Nonreactive   RUQIGG Positive     Assessment & Plan   Brianna Alarcon is a 29 year old female who presents with plan for primary C/S for arrest of descent  IUP @ 40w 5d .  -- To OR for C/S.   -- risks and benefits reviewed  -- Consent signed    RISK - C SECTION  Patient counseled on risks, benefits, alternatives and expectations of  section.  Risks detailed to include, but not be limited to:  Pain, bleeding, infection, anesthesia complications, possible injury to bowel, bladder, baby and/or adjacent tissues, possible need for blood transfusion (with 1/50,000 risk of bloodborne pathogen [HIV and/or Hepatitis B/C] transmission) or even hysterectomy.  Patient voiced understanding of all R/B/A/E and has agreed to proceed with  section for delivery if needed or recommended.     Domi Cuba MD

## 2024-09-28 NOTE — PROGRESS NOTES
Provider notification:   Provider: Dr. Vale Pringle MD was notified at 0050 regarding: a persistent Category II fetal heart rate tracing for 30 minutes.    Category II Algorithm     Fetal heart rate and uterine activity reviewed with provider.    EFM interpretation suggests: absence of concern for metabolic acidemia due to: presence of accelerations and moderate variability. EFM suggests concern for interruption of the oxygen pathway due to:  variable decelerations..     Interventions to improve fetal oxygenation for a Category II tracing include: IV fluid bolus  and oxytocin decreased    After discussion with provider:Plan reassessment in 30 minutes

## 2024-09-28 NOTE — PROGRESS NOTES
Patient now comfortable with epidural.  FHTs cat I, contractions spaced out a bit, back on pitocin at 2 mU.  SVE /.  AROM performed with scant fluid, bloody show.  Plan continue to titrate pitocin to achieve regular contraction pattern.  Anticipate .    MARCELL Pringle MD

## 2024-09-28 NOTE — ANESTHESIA POSTPROCEDURE EVALUATION
Patient: Brianna Alarcon    Procedure: Procedure(s):   SECTION       Anesthesia Type:  Epidural    Note:  Disposition: Inpatient   Postop Pain Control: Uneventful            Sign Out: Well controlled pain   PONV: No   Neuro/Psych: Uneventful            Sign Out: Acceptable/Baseline neuro status   Airway/Respiratory: Uneventful            Sign Out: Acceptable/Baseline resp. status   CV/Hemodynamics: Uneventful            Sign Out: Acceptable CV status; No obvious hypovolemia; No obvious fluid overload   Other NRE: NONE   DID A NON-ROUTINE EVENT OCCUR? No    Event details/Postop Comments:  Sensation returning as expected following neuraxial anesthesia. Pain controlled. Denies headache or back pain. No further questions or concerns. Instructed that we are available  should she have questions pertaining to spinal.       Epidural-to- Updated ASA: 2 Emergent      Last vitals:  Vitals:    24 1152 24 1232 24 1237   BP: 125/74  118/75   Pulse:      Resp: 18  16   Temp:      SpO2: 96% 96% 96%       Electronically Signed By: Greg Monaco MD  2024  1:38 PM

## 2024-09-28 NOTE — PROGRESS NOTES
Minimal progress over 2 hours of pushing.  Patient feeling pressure, sometimes focal pain in the LLQ, and low back pain that restricts ability to push.  Anesthesia here to bolus epidural.  Continues with variable decelerations with contractions with good recovery between.  Will place FSE and reassess need for operative delivery.  Attempting to push in various positions.  Unable to trial hands and knees due to epidural.    MARCELL Pringle MD

## 2024-09-28 NOTE — PROGRESS NOTES
Late entry due to patient care.     I was asked to see Brianna Alarcon by Dr. Pringle for arrest of descent, OP presentation.    Cervical exam completed and complete, 0 station. Attempted manual rotation and baby easily disengaged and rotated, but then rotated back. Attempt was made again at rotation and pushing with little descent.    Discussed , risks/benefits. Patient would like to discuss with her partner and was not what she was hoping, but something she knew was a possibility.    If agreeable, will move forward with . Needs TXA, Azithro and Ancef prior to the procedure.    Signed out to Dr. Cuba who will perform the .    Sheyla Jimenes MD, FACOG, [facility]  2024 7:45 AM

## 2024-09-28 NOTE — ANESTHESIA CARE TRANSFER NOTE
f-  Patient: Brianna Alarcon    Procedure: Procedure(s):   SECTION       Diagnosis: Arrest of descent, delivered, current hospitalization [O62.1]  Diagnosis Additional Information: No value filed.    Anesthesia Type:   Epidural     Note:    Oropharynx: oropharynx clear of all foreign objects  Level of Consciousness: awake  Oxygen Supplementation: room air    Independent Airway: airway patency satisfactory and stable  Dentition: dentition unchanged  Vital Signs Stable: post-procedure vital signs reviewed and stable  Report to RN Given: handoff report given  Patient transferred to: Labor and Delivery    Handoff Report: Identifed the Patient, Identified the Reponsible Provider, Reviewed the pertinent medical history, Discussed the surgical course, Reviewed Intra-OP anesthesia mangement and issues during anesthesia, Set expectations for post-procedure period and Allowed opportunity for questions and acknowledgement of understanding      Vitals:  Vitals Value Taken Time   /83 24 0858   Temp 99    Pulse 89    Resp 18    SpO2 100 % 24 0857   Vitals shown include unfiled device data.    Electronically Signed By: ALEM Moran CRNA  2024  8:58 AM

## 2024-09-28 NOTE — PROGRESS NOTES
Patient feeling pressure with contractions.  Pushing on her left side currently with some slight movement during pushing.  Baby at +2 station.  Coached with pushes to focus maternal efforts.  Heart rate variability with contractions, some variable decelerations with good recovery between.  Recommend continued pushing as patient is able, changing positions to facilitate descent.  Anticipate .    MARCELL Pringle MD

## 2024-09-28 NOTE — ANESTHESIA PROCEDURE NOTES
Epidural catheter Procedure Note    Pre-Procedure   Staff -        Anesthesiologist:  Kari Thomas MD       Performed By: anesthesiologist       Location: OB       Procedure Start/Stop Times: 9/27/2024 9:13 PM and 9/27/2024 9:32 PM       Pre-Anesthestic Checklist: patient identified, IV checked, risks and benefits discussed, informed consent, monitors and equipment checked, pre-op evaluation, at physician/surgeon's request and post-op pain management  Timeout:       Correct Patient: Yes        Correct Procedure: Yes        Correct Site: Yes        Correct Position: Yes   Procedure Documentation  Procedure: epidural catheter       Diagnosis: Labor       Patient Position: sitting       Patient Prep/Sterile Barriers: sterile gloves, mask, patient draped       Skin prep: Betadine and Chloraprep       Local skin infiltrated with 3 mL of 1% lidocaine.        Insertion Site: L2-3. (midline approach).       Technique: LORT saline and LORT air        PHILLIP at 5 cm.       Needle Type: Sterling Consolidatedy needle       Needle Gauge: 18.        Needle Length (Inches): 3.5        Catheter: 20 G.          Catheter threaded easily.         8 cm epidural space.         Threaded 13 cm at skin.         # of attempts: 1 and  # of redirects:  0    Assessment/Narrative         Paresthesias: No.       Test dose of mL lidocaine 1.5% w/ 1:200,000 epinephrine at 21:23 CDT.         Test dose negative, 3 minutes after injection, for signs of intravascular, subdural, or intrathecal injection.       Insertion/Infusion Method: LORT saline and LORT air       Aspiration negative for Heme or CSF via Epidural Catheter.       Sensory Level Left: T9.       Sensory Level Right: T9.    Medication(s) Administered   0.25% Bupivacaine PF (Epidural) - EPIDURAL   5 mL - 9/27/2024 9:28:00 PM  Medication Administration Time: 9/27/2024 9:13 PM     Comments:  Epi   R/b/a discussed, patient agrees to have an epidural catheter placement.   Local infiltration of  "skin, advancement of needle without paresthesias, excellent Nan to NS.   Catheter advanced without resistance nor paresthesias, negative aspiration for heme or CSF.  Remaining 2 mL epidural test dose diluted with 3 mL sterile water and given via epidural bolus.  Patient tolerated the procedure well, all questions answered.        FOR [facility] ([facility]) ONLY:   Pain Team Contact information: please page the Pain Team [address]. Search \"Pain\". During daytime hours, please page the attending first. At night please page the resident first.      "

## 2024-09-28 NOTE — PROGRESS NOTES
Provider notification:   Provider: Dr. Vale Pringle MD was notified at 0130 regarding:  initiation of second stage of labor.     Second Stage of Labor Algorithm    Passenger:  See flowsheets for fetal heart rate tracing data.   EFM interpretation suggests: absence of concern for metabolic acidemia due to: presence of accelerations and moderate variability. EFM suggests concern for interruption of the oxygen pathway due to:  variable decelerations.    Position  Fetal position:unable to determine  Fetal station: 0    Power  See flowsheets for uterine activity data.  Contraction frequency: every 3 minutes.   Contraction strength: moderate by palpation.    Maternal current position change frequency:every 30 minutes    Psyche  Epidural / ITN: Yes  Maternal pain management: comfortable  Urge to push: not present    Plan  After discussion with provider:  Interventions to optimize second stage:laboring down  Next notification: Will notify in an hour for progress update    ..

## 2024-09-28 NOTE — PLAN OF CARE
"  Problem: Postpartum ( Delivery)  Goal: Hemostasis  Outcome: Progressing     Problem: Postpartum ( Delivery)  Goal: Optimal Pain Control and Function  Outcome: Progressing   Goal Outcome Evaluation:    VSS postpartum. Fundus firm, midline. Small amount of rubra lochia present on brice pad. Joyce in place, patent, draining clear yellow urine.  incision covered with mepilex and is C/D/I. Pt states pain is well controlled with scheduled tylenol and toradol along with PRN oxycodone. Pt reports some dizziness after oxycodone administration, stating \"this is how I felt when I took percocet in the past.\" Pt reports dizziness has improved some after napping. Will continue to monitor.      "

## 2024-09-28 NOTE — CONSULTS
Expand All Collapse All    [facility]     History and Physical       Date of Admission:  2024        History of Present Illness  Brianna Alarcon is a 29 year old female  40w 5d  Estimated Date of Delivery: Sep 23, 2024 presented for scheduled induction. She got to complete and pushed for 3 hours. Dr. Jimenes discussed operative delivery vs. C/S. She would like to proceed with C/S.      OB COMPLICATIONS:  None           Past Medical History  Past Medical History        Past Medical History:   Diagnosis Date    Anxiety      Controlled substance agreement signed 2019    Depressive disorder      IBS (irritable bowel syndrome)      Uncomplicated asthma                    Past Surgical History  Past Surgical History         Past Surgical History:   Procedure Laterality Date    COLONOSCOPY         x2    WISDOM TOOTH EXTRACTION                                OB History    Para Term  AB Living   1 0 0 0 0 0   SAB IAB Ectopic Multiple Live Births      0 0 0 0 0          # Outcome Date GA Lbr Dale/2nd Weight Sex Type Anes PTL Lv   1 Current                              Social History  Social History               Tobacco Use    Smoking status: Never       Passive exposure: Never    Smokeless tobacco: Never   Vaping Use    Vaping status: Never Used   Substance Use Topics    Alcohol use: Not Currently       Alcohol/week: 0.0 - 3.0 standard drinks of alcohol       Comment: Not since positive pregnancy test    Drug use: Never               Family History  Family History         Family History   Adopted: Yes   Problem Relation Age of Onset    Hypertension Mother      Depression Mother      Anxiety Disorder Mother      Mental Illness Mother           Bi polar    Substance Abuse Mother      Unknown/Adopted Mother      No Known Problems Father                    Prior to Admission Medications  Prior to Admission Medications   Prescriptions Last Dose Informant Patient  Reported? Taking?   Prenatal Vit-Fe Fumarate-FA (PRENATAL MULTIVITAMIN  PLUS IRON) 27-1 MG TABS     Yes No   Sig: Take by mouth daily   famotidine (PEPCID) 20 MG tablet     No No   Sig: Take 1 tablet (20 mg) by mouth 2 times daily   ondansetron (ZOFRAN) 4 MG tablet 2024   No Yes   Sig: Take 1 tablet (4 mg) by mouth every 8 hours as needed for nausea      Facility-Administered Medications: None            Allergies  Allergies         Allergies   Allergen Reactions    Ajovy [Fremanezumab-Vfrm] Dermatitis    Aspirin Unknown       Drowsy, blurry vision    Oxycodone-Acetaminophen Nausea       Any pain meds make her sick and cause nausea    Trimethoprim Hives    Sulfamethoxazole Rash    Sulfamethoxazole-Trimethoprim [Sulfamethoxazole-Trimethoprim] Rash and Hives                  Physical Exam  Vital Signs with Ranges  Temp:  [97.8  F (36.6  C)-98.7  F (37.1  C)] 98.7  F (37.1  C)  Pulse:  [69-75] 69  Resp:  [16-18] (P) 16  BP: (104-139)/(56-85) 138/78  SpO2:  [84 %-100 %] 100 %     Gen: no acute distress, resting comfortably   CV: acyanotic   Heart: regular rate and rhythm   Pulm: unlabored respirations, clear to ausculation bilaterally    Abd: gravid, soft, nontender   Extremities: soft, nontender          Recent Labs   Lab Test 24  0944   AS Negative          Recent Labs   Lab Test 24  1504   HEPBANG Nonreactive   HIAGAB Nonreactive   RUQIGG Positive            Assessment & Plan  Brianna Alarcon is a 29 year old female who presents with plan for primary C/S for arrest of descent  IUP @ 40w 5d .  -- To OR for C/S.   -- risks and benefits reviewed  -- Consent signed     RISK - C SECTION  Patient counseled on risks, benefits, alternatives and expectations of  section.  Risks detailed to include, but not be limited to:  Pain, bleeding, infection, anesthesia complications, possible injury to bowel, bladder, baby and/or adjacent tissues, possible need for blood transfusion (with 1/50,000 risk of  bloodborne pathogen [HIV and/or Hepatitis B/C] transmission) or even hysterectomy.  Patient voiced understanding of all R/B/A/E and has agreed to proceed with  section for delivery if needed or recommended.      Domi Cuba MD

## 2024-09-28 NOTE — PROGRESS NOTES
Data: Brianna Alarcon transferred to [facility]/[id number] via Zoom Cart. Patient transferred to Postpartum with .    Action: Receiving unit notified of transfer. Patient and support person notified of room change. Patient and belongings accompanied by Registered Nurse. Bedside report given to KENYA Cueva. Fundal check performed with receiving RN. Oriented patient to surroundings. Call light within reach.    Response: Patient tolerated transfer.    Marleny Campo RN  2024 3:54 PM

## 2024-09-28 NOTE — PROGRESS NOTES
Epidural bolus given, patient now more comfortable with contractions, but unable to push due to fatigue.  Not feeling pressure/urge.  Pushing likely to be ineffective per anesthesia.  Will allow patient to rest as long as vitals are stable and FHTs reassuring and reassess when she is feeling more pressure.    MARCELL Pringle MD

## 2024-09-29 LAB — HGB BLD-MCNC: 7.7 G/DL (ref 11.7–15.7)

## 2024-09-29 PROCEDURE — 120N000001 HC R&B MED SURG/OB

## 2024-09-29 PROCEDURE — 258N000003 HC RX IP 258 OP 636: Performed by: FAMILY MEDICINE

## 2024-09-29 PROCEDURE — 250N000011 HC RX IP 250 OP 636: Performed by: OBSTETRICS & GYNECOLOGY

## 2024-09-29 PROCEDURE — 36415 COLL VENOUS BLD VENIPUNCTURE: CPT | Performed by: OBSTETRICS & GYNECOLOGY

## 2024-09-29 PROCEDURE — 250N000011 HC RX IP 250 OP 636: Performed by: FAMILY MEDICINE

## 2024-09-29 PROCEDURE — 250N000013 HC RX MED GY IP 250 OP 250 PS 637: Performed by: OBSTETRICS & GYNECOLOGY

## 2024-09-29 PROCEDURE — 999N000248 HC STATISTIC IV INSERT WITH US BY RN

## 2024-09-29 PROCEDURE — 85018 HEMOGLOBIN: CPT | Performed by: OBSTETRICS & GYNECOLOGY

## 2024-09-29 RX ORDER — DIPHENHYDRAMINE HYDROCHLORIDE 50 MG/ML
50 INJECTION INTRAMUSCULAR; INTRAVENOUS
Status: DISCONTINUED | OUTPATIENT
Start: 2024-09-29 | End: 2024-10-01 | Stop reason: HOSPADM

## 2024-09-29 RX ORDER — METHYLPREDNISOLONE SODIUM SUCCINATE 125 MG/2ML
125 INJECTION, POWDER, LYOPHILIZED, FOR SOLUTION INTRAMUSCULAR; INTRAVENOUS
Status: DISCONTINUED | OUTPATIENT
Start: 2024-09-29 | End: 2024-10-01 | Stop reason: HOSPADM

## 2024-09-29 RX ADMIN — ACETAMINOPHEN 975 MG: 325 TABLET ORAL at 09:30

## 2024-09-29 RX ADMIN — IBUPROFEN 800 MG: 800 TABLET ORAL at 22:30

## 2024-09-29 RX ADMIN — ACETAMINOPHEN 975 MG: 325 TABLET ORAL at 16:07

## 2024-09-29 RX ADMIN — IBUPROFEN 800 MG: 800 TABLET ORAL at 09:31

## 2024-09-29 RX ADMIN — SENNOSIDES AND DOCUSATE SODIUM 2 TABLET: 8.6; 5 TABLET ORAL at 09:30

## 2024-09-29 RX ADMIN — ACETAMINOPHEN 975 MG: 325 TABLET ORAL at 22:30

## 2024-09-29 RX ADMIN — KETOROLAC TROMETHAMINE 30 MG: 30 INJECTION, SOLUTION INTRAMUSCULAR at 03:20

## 2024-09-29 RX ADMIN — IBUPROFEN 800 MG: 800 TABLET ORAL at 16:08

## 2024-09-29 RX ADMIN — IRON SUCROSE 300 MG: 20 INJECTION, SOLUTION INTRAVENOUS at 11:39

## 2024-09-29 RX ADMIN — ACETAMINOPHEN 975 MG: 325 TABLET ORAL at 03:20

## 2024-09-29 ASSESSMENT — ACTIVITIES OF DAILY LIVING (ADL)
ADLS_ACUITY_SCORE: 19
ADLS_ACUITY_SCORE: 19
ADLS_ACUITY_SCORE: 18
ADLS_ACUITY_SCORE: 19

## 2024-09-29 NOTE — PROGRESS NOTES
Iron sucrose infusion complete. Vital signs stable. Lung sounds clear. Denies headache, lightheadedness, and dizziness. Tolerated well. Left peripheral IV infiltrated. PICC team was called and placed new peripheral IV in right arm.

## 2024-09-29 NOTE — LACTATION NOTE
This note was copied from a baby's chart.  Lactation Visit:      Hours since Delivery: 1 day 4 hours old.    Gestational Age at Delivery: 40.5 weeks.    Maternal Risk Factors to consider: Primip,  birth, anxiety, and depression.    Visit: Since birth, Tyler has been to breast 8 times, has received 1-5mL of MBM and/or DBM via bottle per feeding, has voided x1, stooled x4, and has had a weight loss of 4.5% (since birth). Upon entering room, infant breastfeeding on right breast in modified football hold-infant deep on breast tissue, mother reporting latch is comfortable. Mother states infant has been latching well to breast, and if he is sleepy she is hand expressing and offering to infant. Discussed  feeding behaviors during first few days of life (including normal sleepiness and second night behaviors), benefits of skin-to-skin, waking techniques, hunger cues, nutritive vs non-nutritive sucking, hand expression, the importance of tracking infants feedings and diaper output, as well as supplementation volumes and pumping if infant isn't going to breast well for feedings. Education given on importance of infant positioning for a deep, comfortable latch for effective milk transfer. Supplementation handout provided. Infant had circumcision this AM, and had a clear emesis while at breast. Tyler brought skin-to-skin after emesis, and started to fall asleep- discussed normal sleepiness and how to comfort infant after circumcision. Discussed feeding plan below. Encouraged mother to let primary RN know if she would like lactation to return for feeding assistance or if questions arise. Mother aware of lactation resources available to her after discharging from hospital.     Plan: Skin-to-skin prior to feedings. Continue breastfeeding on-demand and/or every 2-3 hours. Track feedings and diaper output. Encouraged mother to hand express and offer to Tyler after his next feeding if he is sleepy.    Has  Breast Pump for Home: Yes, Medela and hands-free pump.    Education given: Stages of milk production after delivery,  behaviors during first few days of life, Hunger cues, Breastfeeding positions, How to obtain & maintain a deep, comfortable latch, Listening & watching for swallows, How do I know if my baby is finished & getting enough, Benefits of skin-to-skin prior to feedings, Importance of tracking all feedings and diaper output, Hand expression, Nutritive vs non-nutritive sucking, Pumping for missed feedings at breast, Cluster feeding, How to tell if breastfeeding is going well, Supplementation volumes during first few days of life, Freeman waking techniques, Banked donor breast milk, How to adjust a shallow latch, and Breast pump use for home.

## 2024-09-29 NOTE — PROGRESS NOTES
At 2310, RN called into room. Patient rated pain 8 out of 10. Pain started when patient quickly tessa out of bed. Patient requesting oxycodone but verbalized worry about being nauseous due to previous nausea interactions with oxycodone. RN educated and offered Reglan, patient requested reglan. RN gave reglan at 2312, and about 15 minutes later, patient became nauseous, short of breath, teary eyed and exclaimed that she was feeling hot. Oxycodone not given. Patient and spouse confirmed that she has a history of panic attacks as a reaction to some medications. RN reassured patient and turned on the TV as a distraction as well as applied ice to the incision area. After these interventions, patient fell asleep.     At 0029, RN ok'd by spouse to wake patient up. Patient reported relief of pain, rating pain a 3 out of 10. Vitals and assessment WDL at that time

## 2024-09-29 NOTE — PROVIDER NOTIFICATION
09/29/24 0814 09/29/24 0832   Vital Signs   Pulse 86 83   Pulse Rate Source Monitor Monitor   Oximeter Heart Rate 89 bpm  --    BP (!) 140/80 138/80     Dr. Jimenes updated on blood pressures. Patient asymptomatic. Denies headache, vision changes, and right upper quadrant pain. Will continue to monitor.

## 2024-09-29 NOTE — PLAN OF CARE
Goal Outcome Evaluation:      Plan of Care Reviewed With: patient, spouse    Overall Patient Progress: improvingOverall Patient Progress: improving       Problem: Adult Inpatient Plan of Care  Goal: Plan of Care Review  Outcome: Progressing  Flowsheets (Taken 2024 4434)  Plan of Care Reviewed With:   patient   spouse  Overall Patient Progress: improving     Problem: Postpartum ( Delivery)  Goal: Optimal Pain Control and Function  Outcome: Progressing     Problem: Postpartum ( Delivery)  Goal: Effective Urinary Elimination  Outcome: Progressing     Problem: Breastfeeding  Goal: Effective Breastfeeding  Outcome: Progressing     Vital signs stable. Fundus firm. Lochia WDL.   Hemoglobin 7.7 this morning. MD notified. Iron sucrose ordered and infusing. Patient tolerating well.   Mepilex dressing in place. No drainage noted. Clean dry intact.   Voiding frequently and passing gas. Ambulating in room independently.   Reports incisional pain. Pain managed with scheduled Ibuprofen and Tylenol.   Bonding well with baby and attentive to  cues. Practicing skin to skin. Breastfeeding . At 1200  able to latch to right breast with football hold. Colostrum expressed with hand expression. Milan able to achieve deep latch with multiple, audible sucks and swallows. Mom denies nipple tenderness with feeding.   Reviewed plan of care with patient.

## 2024-09-29 NOTE — PROGRESS NOTES
Charge RN asked to assess PIV site for bruising. PIV infiltrated, infusions are already on hold. Difficult PIV start, anesthesia needed US yesterday. PICC team paged for PIV restart. Petty Alfred RN on 9/29/2024 at 12:29 PM

## 2024-09-29 NOTE — PROVIDER NOTIFICATION
09/29/24 0015   Provider Notification   Provider Name/Title Dr. Cuba   Method of Notification In Department   Request Evaluate-Remote   Notification Reason Other     Spoke with Dr. Cuba regarding uncontrolled pain and panic attack noted in this RN's progress note. RN and provider discussed pain management ideas and interventions for the rest of the night. Per provider, plan to continue scheduled tylenol and toradol.

## 2024-09-29 NOTE — PROGRESS NOTES
Obstetrics Post-Op Progress Note         Assessment and Plan:    Assessment: Brianna Alarcon is a 29 year old  Post-operative day #1 s/p Low transverse primary  section doing okay.     L&D complications: Arrest of descent  Acute blood loss anemia - hemoglobin 7.7 from 11.1  Intermittently elevated BP           Plan:   Ambulation encouraged  Pain control measures as needed  Iron infusion today - needs IV replaced due to infiltration  Monitor blood pressure, labile and low mild range. No need for meds currently.  Discharge in 2 days likely           Interval History:   Doing well.  Pain is well-controlled.  No fevers.  No history of wound drainage, warmth or significant erythema.  Has some itching just above her bandage. Good appetite. Ambulatory.  Breastfeeding.             Physical Exam:   Temp: 98.4  F (36.9  C) Temp src: Oral BP: 138/80 Pulse: 83   Resp: 16 SpO2: 97 % O2 Device: None (Room air)    Vitals:    24 0806   Weight: 85.3 kg (188 lb)     Vital Signs with Ranges  Temp:  [98.3  F (36.8  C)-98.8  F (37.1  C)] 98.4  F (36.9  C)  Pulse:  [77-96] 83  Resp:  [16-18] 16  BP: (104-145)/(58-82) 138/80  SpO2:  [94 %-98 %] 97 %  I/O last 3 completed shifts:  In: 1750 [I.V.:1500; IV Piggyback:250]  Out: 4095 [Urine:3375; Blood:720]    Uterine fundus is firm, non-tender and at the level of the umbilicus  Incision covered with Mepilex          Data:     Recent Results (from the past 24 hour(s))   Hemoglobin    Collection Time: 24  6:12 AM   Result Value Ref Range    Hemoglobin 7.7 (L) 11.7 - 15.7 g/dL     Recent Labs   Lab Test 24  09   AS Negative     Recent Labs   Lab Test 24  0612 24   HGB 7.7* 11.1*     Recent Labs   Lab Test 24  1504   RUQIGG Positive       Rachel Jimenes MD

## 2024-09-29 NOTE — PROVIDER NOTIFICATION
09/29/24 1139 09/29/24 1154 09/29/24 1211   Vital Signs   Oximeter Heart Rate 96 bpm 87 bpm 96 bpm   BP (!) 140/79  (iron infusion started) 134/82 128/82     Dr. Jimenes updated on blood pressures. Patient asymptomatic. Denies headache, vision changes, and right upper quadrant pain. Will continue to monitor.         21-Dec-2019 04:12

## 2024-09-30 PROBLEM — R03.0 ELEVATED BP WITHOUT DIAGNOSIS OF HYPERTENSION: Status: ACTIVE | Noted: 2024-09-30

## 2024-09-30 PROBLEM — Z98.891 S/P CESAREAN SECTION: Status: ACTIVE | Noted: 2024-09-30

## 2024-09-30 PROBLEM — D62 ANEMIA DUE TO BLOOD LOSS, ACUTE: Status: ACTIVE | Noted: 2024-09-30

## 2024-09-30 PROCEDURE — 120N000001 HC R&B MED SURG/OB

## 2024-09-30 PROCEDURE — 250N000013 HC RX MED GY IP 250 OP 250 PS 637: Performed by: OBSTETRICS & GYNECOLOGY

## 2024-09-30 RX ORDER — FERROUS SULFATE 325(65) MG
325 TABLET, DELAYED RELEASE (ENTERIC COATED) ORAL DAILY
COMMUNITY
Start: 2024-09-30

## 2024-09-30 RX ADMIN — IBUPROFEN 800 MG: 800 TABLET ORAL at 04:44

## 2024-09-30 RX ADMIN — IBUPROFEN 800 MG: 800 TABLET ORAL at 10:06

## 2024-09-30 RX ADMIN — ACETAMINOPHEN 975 MG: 325 TABLET ORAL at 16:24

## 2024-09-30 RX ADMIN — SENNOSIDES AND DOCUSATE SODIUM 2 TABLET: 8.6; 5 TABLET ORAL at 10:05

## 2024-09-30 RX ADMIN — ACETAMINOPHEN 975 MG: 325 TABLET ORAL at 10:06

## 2024-09-30 RX ADMIN — IBUPROFEN 800 MG: 800 TABLET ORAL at 16:24

## 2024-09-30 RX ADMIN — ACETAMINOPHEN 975 MG: 325 TABLET ORAL at 04:44

## 2024-09-30 RX ADMIN — SENNOSIDES AND DOCUSATE SODIUM 1 TABLET: 8.6; 5 TABLET ORAL at 22:08

## 2024-09-30 RX ADMIN — ACETAMINOPHEN 975 MG: 325 TABLET ORAL at 22:07

## 2024-09-30 RX ADMIN — IBUPROFEN 800 MG: 800 TABLET ORAL at 22:08

## 2024-09-30 ASSESSMENT — ACTIVITIES OF DAILY LIVING (ADL)
ADLS_ACUITY_SCORE: 18

## 2024-09-30 NOTE — LACTATION NOTE
This note was copied from a baby's chart.  Lactation follow-up Note:      Hours since Delivery: 2 day 1 hours old.    Gestational Age at Delivery: 40.5 weeks.    Visit: In the last 24 hours, Tyler has been to breast 11 times (including attempts), has voided x3, stooled x4, and had a weight loss of 6.26% (since delivery). Mother states infant is continuing to latch deep onto breast, she is hearing swallows, and has no concerns bringing Tyler to breast. Education given on self-care for engorgement. And the importance of continuing to track infants feedings and diaper output. Discussed feeding plan below. Mother declines the need for nipple cream. Encouraged mother to let primary RN know if she would like lactation to return for feeding assistance or if questions arise. Mother aware of lactation resources available to her after discharging from hospital.     Plan: Skin-to-skin prior to feedings. Continue breastfeeding on-demand and/or every 2-3 hours. Track feedings and diaper output.     Has Breast Pump for Home: Yes, Sharon.    Education given: Breastfeeding positions, How to obtain & maintain a deep, comfortable latch, Listening & watching for swallows, How do I know if my baby is finished & getting enough, Benefits of skin-to-skin prior to feedings, Importance of tracking all feedings and diaper output, Engorgement, Reverse pressure softening, Hand expression, How to tell if breastfeeding is going well, Supplementation volumes during first few days of life,  waking techniques, How to adjust a shallow latch, Techniques for keeping infant actively sucking, including breast compressions, and Breast pump use for home.

## 2024-09-30 NOTE — PLAN OF CARE
"Goal Outcome Evaluation:      Plan of Care Reviewed With: patient, spouse    Overall Patient Progress: improvingOverall Patient Progress: improving    Outcome Evaluation: Overall improving Post op  day 2.  V/S and assessment WDL.  Adequate oral food and fluid intake.  Voiding well and had stool.  Up ad naatlie independent in room and independent with self cares and baby cares.  Independent with breastfeeding, lactation nurse came and visit this morning.  Pain controlled with ibuprofen and tylenol every 6 hours scheduled.  Pain 3 1/10 down to 2/10 after pain medications.  Systolic BP remained below 140 with only 1 above 140 this morning but decreased to 130\"s within 15 minutes, asymptomatic of pre-eclampsia symptoms.  Incision clean, dry and intact.      Problem: Breastfeeding  Goal: Effective Breastfeeding  Outcome: Progressing     Problem: Postpartum ( Delivery)  Goal: Effective Urinary Elimination  Outcome: Progressing     Problem: Postpartum ( Delivery)  Goal: Effective Bowel Elimination  Outcome: Progressing     Problem: Postpartum ( Delivery)  Goal: Absence of Infection Signs and Symptoms  Outcome: Progressing     Problem: Postpartum ( Delivery)  Goal: Successful Parent Role Transition  Outcome: Progressing     Problem: Adult Inpatient Plan of Care  Goal: Optimal Comfort and Wellbeing  Outcome: Progressing     "

## 2024-09-30 NOTE — PLAN OF CARE
Goal Outcome Evaluation:      Plan of Care Reviewed With: patient, spouse    Overall Patient Progress: improvingOverall Patient Progress: improving    Outcome Evaluation: Overall improving Post op  day 2.  V/S and assessment WDL.  Adequate oral food and fluid intake.  Voiding well and had stool.  Up ad natalie independent in room and independent with self cares and baby cares.  Independent with breastfeeding, lactation nurse came and visit this morning.  Latch score=8/10, deep jaw excursions, good rhythm with adequate pauses and audible swallows with breast feeding, foot ball hold with minimal positioning assist.      Problem: Breastfeeding  Goal: Effective Breastfeeding  2024 1814 by Beth Quiles RN  Outcome: Progressing  2024 1404 by Beth Quiles RN  Outcome: Progressing  Intervention: Promote Breast Care and Comfort  Recent Flowsheet Documentation  Taken 2024 1624 by Beth Quiles RN  Breast Care: Breastfeeding: breast milk to nipples  Breast Care: hand expression utilized  Intervention: Promote Effective Breastfeeding  Recent Flowsheet Documentation  Taken 2024 1624 by Beth Quiles RN  Breastfeeding Assistance:   assisted with techniques for flat/inverted nipples   feeding cue recognition promoted   feeding on demand promoted   feeding session observed   hand expression verified   infant latch-on verified   infant suck/swallow verified   support offered  Parent-Child Attachment Promotion:   caring behavior modeled   cue recognition promoted   face-to-face positioning promoted   interaction encouraged   parent/caregiver presence encouraged   participation in care promoted   positive reinforcement provided   rooming-in promoted   skin-to-skin contact encouraged   strengths emphasized  Intervention: Support Exclusive Breastfeeding Success  Recent Flowsheet Documentation  Taken 2024 1624 by Beth Quiles RN  Supportive Measures:   active listening utilized   decision-making  supported   problem-solving facilitated   relaxation techniques promoted   self-care encouraged   self-responsibility promoted   verbalization of feelings encouraged

## 2024-09-30 NOTE — PLAN OF CARE
"Goal Outcome Evaluation:  Patient's VSS. Fundus is firm 1 below umbilicus with scant lochia. Patient is up ad natalie and performing self-cares independently. Reporting moderate pain being treated with tylenol and ibuprofen as scheduled, as well as abdominal binder and ice packs. Patient had two loose stools overnight. Reports intermittent itchiness of rash on abdomen where fetal monitors were in labor care. Patient reports ice helps with this. Patient's spouse, Gino, is in the room with her and infant, attentive/supportive and participating in cares.    Patient aware to fill out birth certificate and PPMA; has not yet completed them.    BP (!) 141/70 (BP Location: Right arm)   Pulse 83   Temp 98.2  F (36.8  C) (Oral)   Resp 18   Ht 1.537 m (5' 0.5\")   Wt 85.3 kg (188 lb)   LMP  (LMP Unknown)   SpO2 96%   Breastfeeding Unknown   BMI 36.11 kg/m        Problem: Adult Inpatient Plan of Care  Goal: Optimal Comfort and Wellbeing  Outcome: Progressing     Problem: Postpartum ( Delivery)  Goal: Successful Parent Role Transition  Outcome: Progressing     Problem: Postpartum ( Delivery)  Goal: Optimal Pain Control and Function  Outcome: Progressing     Problem: Breastfeeding  Goal: Effective Breastfeeding  Outcome: Progressing       Plan of Care Reviewed With: patient, spouse    Overall Patient Progress: improvingOverall Patient Progress: improving           "

## 2024-09-30 NOTE — DISCHARGE INSTRUCTIONS
Warning Signs after Having a Baby    Keep this paper on your fridge or somewhere else where you can see it.    Call your provider if you have any of these symptoms up to 12 weeks after having your baby.    Thoughts of hurting yourself or your baby  Pain in your chest or trouble breathing  Severe headache not helped by pain medicine  Eyesight concerns (blurry vision, seeing spots or flashes of light, other changes to eyesight)  Fainting, shaking or other signs of a seizure    Call 9-1-1 if you feel that it is an emergency.     The symptoms below can happen to anyone after giving birth. They can be very serious. Call your provider if you have any of these warning signs.    My provider s phone number: _______________________    Losing too much blood (hemorrhage)    Call your provider if you soak through a pad in less than an hour or pass blood clots bigger than a golf ball. These may be signs that you are bleeding too much.    Blood clots in the legs or lungs    After you give birth, your body naturally clots its blood to help prevent blood loss. Sometimes this increased clotting can happen in other areas of the body, like the legs or lungs. This can block your blood flow and be very dangerous.     Call your provider if you:  Have a red, swollen spot on the back of your leg that is warm or painful when you touch it.   Are coughing up blood.     Infection    Call your provider if you have any of these symptoms:  Fever of 100.4 F (38 C) or higher.  Pain or redness around your stitches if you had an incision.   Any yellow, white, or green fluid coming from places where you had stitches or surgery.    Mood Problems (postpartum depression)    Many people feel sad or have mood changes after having a baby. But for some people, these mood swings are worse.     Call your provider right away if you feel so anxious or nervous that you can't care for yourself or your baby.    Preeclampsia (high blood pressure)    Even if you  didn't have high blood pressure when you were pregnant, you are at risk for the high blood pressure disease called preeclampsia. This risk can last up to 12 weeks after giving birth.     Call your provider if you have:   Pain on your right side under your rib cage  Sudden swelling in the hands and face    Remember: You know your body. If something doesn't feel right, get medical help.     For informational purposes only. Not to replace the advice of your health care provider. Copyright 2020 [facility]. All rights reserved. Clinically reviewed by Jana Palacios, RNC-OB, MSN. [employer] [id number] - Rev 02/23.

## 2024-09-30 NOTE — PROGRESS NOTES
Obstetrics Post-Op Progress Note         Assessment and Plan:    Assessment: Brianna Alarcon is a 29 year old  Post-operative day #2 s/p Low transverse primary  section doing well.     L&D complications: Arrest of descent, delivered, current hospitalization [O62.1]  Acute blood loss anemia, asymptomatic  Intermittently elevated BP           Plan:   Ambulation encouraged  Hemoglobin in AM  Monitor wound for signs of infection  Pain control measures as needed  Reportable signs and symptoms dicussed with the patient  PO iron on discharge   Monitor blood pressure, labile and low mid range. Not on meds currently.   Anticipate discharge tomorrow           Interval History:   Doing well.  Pain is controlled.  No fevers.  No history of wound drainage, warmth or significant erythema.  Good appetite. Had bowel movement yesterday. Denies chest pain, shortness of breath, nausea or vomiting.  Breastfeeding well, lactation involved. Denies dizziness, HA, visual disturbances, RUQ or epigastric pain. Mild heart burn relieved with antacids.              Physical Exam:   Temp: 98.2  F (36.8  C) Temp src: Oral BP: 134/80 Pulse: 88   Resp: 18 SpO2: 96 % O2 Device: None (Room air)    Vitals:    24 0806   Weight: 85.3 kg (188 lb)     Vital Signs with Ranges  Temp:  [98.2  F (36.8  C)-98.7  F (37.1  C)] 98.2  F (36.8  C)  Pulse:  [] 88  Resp:  [17-18] 18  BP: (110-141)/(60-82) 134/80  SpO2:  [95 %-98 %] 96 %  No intake/output data recorded.    Uterine fundus is firm, non-tender and at the level of the umbilicus  Incision C/D/I, mepilex on   Reflexes +2 bilaterally, patellar  +2 Bilateral lower extremity edema           Data:   No results found for this or any previous visit (from the past 24 hour(s)).  Recent Labs   Lab Test 24  0944   AS Negative     Recent Labs   Lab Test 24  0612 24  0944   HGB 7.7* 11.1*     Recent Labs   Lab Test 24  1504   RUQIGG Positive       Jessica Moritz,  APRN CNP

## 2024-10-01 VITALS
SYSTOLIC BLOOD PRESSURE: 135 MMHG | RESPIRATION RATE: 18 BRPM | HEIGHT: 61 IN | BODY MASS INDEX: 35.57 KG/M2 | TEMPERATURE: 98.1 F | DIASTOLIC BLOOD PRESSURE: 85 MMHG | HEART RATE: 77 BPM | OXYGEN SATURATION: 96 % | WEIGHT: 188.4 LBS

## 2024-10-01 PROCEDURE — 250N000013 HC RX MED GY IP 250 OP 250 PS 637: Performed by: OBSTETRICS & GYNECOLOGY

## 2024-10-01 RX ORDER — ACETAMINOPHEN 325 MG/1
975 TABLET ORAL EVERY 6 HOURS
COMMUNITY
Start: 2024-10-01

## 2024-10-01 RX ORDER — OXYCODONE HYDROCHLORIDE 5 MG/1
5 TABLET ORAL EVERY 4 HOURS PRN
Qty: 6 TABLET | Refills: 0 | Status: SHIPPED | OUTPATIENT
Start: 2024-10-01

## 2024-10-01 RX ORDER — FERROUS SULFATE 325(65) MG
325 TABLET, DELAYED RELEASE (ENTERIC COATED) ORAL DAILY
COMMUNITY
Start: 2024-10-01

## 2024-10-01 RX ORDER — AMOXICILLIN 250 MG
2 CAPSULE ORAL 2 TIMES DAILY
COMMUNITY
Start: 2024-10-01

## 2024-10-01 RX ORDER — IBUPROFEN 800 MG/1
800 TABLET, FILM COATED ORAL EVERY 6 HOURS
COMMUNITY
Start: 2024-10-01

## 2024-10-01 RX ADMIN — ACETAMINOPHEN 975 MG: 325 TABLET ORAL at 03:58

## 2024-10-01 RX ADMIN — IBUPROFEN 800 MG: 800 TABLET ORAL at 09:49

## 2024-10-01 RX ADMIN — ACETAMINOPHEN 975 MG: 325 TABLET ORAL at 09:49

## 2024-10-01 RX ADMIN — SENNOSIDES AND DOCUSATE SODIUM 1 TABLET: 8.6; 5 TABLET ORAL at 08:35

## 2024-10-01 RX ADMIN — IBUPROFEN 800 MG: 800 TABLET ORAL at 03:58

## 2024-10-01 ASSESSMENT — ACTIVITIES OF DAILY LIVING (ADL)
ADLS_ACUITY_SCORE: 18

## 2024-10-01 NOTE — DISCHARGE SUMMARY
[facility] Discharge Summary    Brianna Alarcon MRN# [id number]   Age: 29 year old YOB: 1995     Date of Admission:  2024  Date of Discharge::  10/1/2024  Admitting Physician:  Vale Pringle MD  Discharge Physician:  Anneliese Somers MD             Admission Diagnoses:   Pregnancy          Discharge Diagnosis:     S/p           Procedures:     Procedure(s): Primary low transverse  section       No other procedures performed during this admission           Medications Prior to Admission:     Medications Prior to Admission   Medication Sig Dispense Refill Last Dose    ondansetron (ZOFRAN) 4 MG tablet Take 1 tablet (4 mg) by mouth every 8 hours as needed for nausea 30 tablet 1 2024    famotidine (PEPCID) 20 MG tablet Take 1 tablet (20 mg) by mouth 2 times daily 60 tablet 3     Prenatal Vit-Fe Fumarate-FA (PRENATAL MULTIVITAMIN  PLUS IRON) 27-1 MG TABS Take by mouth daily                Discharge Medications:     Current Discharge Medication List        START taking these medications    Details   acetaminophen (TYLENOL) 325 MG tablet Take 3 tablets (975 mg) by mouth every 6 hours.    Associated Diagnoses: S/P  section      !! ferrous sulfate (FE TABS) 325 (65 Fe) MG EC tablet Take 1 tablet (325 mg) by mouth daily.    Associated Diagnoses: Anemia due to blood loss, acute      !! ferrous sulfate (FE TABS) 325 (65 Fe) MG EC tablet Take 1 tablet (325 mg) by mouth daily.    Associated Diagnoses: Anemia due to blood loss, acute      ibuprofen (ADVIL/MOTRIN) 800 MG tablet Take 1 tablet (800 mg) by mouth every 6 hours.    Associated Diagnoses: S/P  section      oxyCODONE (ROXICODONE) 5 MG tablet Take 1 tablet (5 mg) by mouth every 4 hours as needed for breakthrough pain.  Qty: 6 tablet, Refills: 0    Associated Diagnoses: S/P  section      senna-docusate (SENOKOT-S/PERICOLACE) 8.6-50 MG tablet Take 2 tablets by mouth 2 times daily.     "Associated Diagnoses: S/P  section       !! - Potential duplicate medications found. Please discuss with provider.        CONTINUE these medications which have NOT CHANGED    Details   ondansetron (ZOFRAN) 4 MG tablet Take 1 tablet (4 mg) by mouth every 8 hours as needed for nausea  Qty: 30 tablet, Refills: 1    Associated Diagnoses: Vomiting or nausea of pregnancy      famotidine (PEPCID) 20 MG tablet Take 1 tablet (20 mg) by mouth 2 times daily  Qty: 60 tablet, Refills: 3    Associated Diagnoses: Heartburn during pregnancy in second trimester      Prenatal Vit-Fe Fumarate-FA (PRENATAL MULTIVITAMIN  PLUS IRON) 27-1 MG TABS Take by mouth daily                   Consultations:   No consultations were requested during this admission          Brief History of Labor or Admission:   Brianna Alarcon is a 29 year old female  40w 5d  Estimated Date of Delivery: Sep 23, 2024 presented for scheduled induction. She got to complete and pushed for 3 hours. Dr. Jimenes discussed operative delivery vs. C/S. She would like to proceed with C/S.            Hospital Course:   The patient's hospital course was unremarkable.  She recovered as anticipated and experienced no post-operative complications.  On discharge, her pain was well controlled. Vaginal bleeding is similar to peak menstrual flow.  Voiding without difficulty.  Ambulating well and tolerating a normal diet.  No fever or significant wound drainage.  She denied any dizziness or light-headedness with ambulation    /85 (BP Location: Right arm, Patient Position: Semi-Whelan's, Cuff Size: Adult Regular)   Pulse 77   Temp 98.1  F (36.7  C) (Oral)   Resp 18   Ht 1.537 m (5' 0.5\")   Wt 85.5 kg (188 lb 6.4 oz)   LMP  (LMP Unknown)   SpO2 96%   Breastfeeding Unknown   BMI 36.19 kg/m      PE:   General: NAD  Abdoman: bandage c/d/i    Post-partum hemoglobin:   Hemoglobin   Date Value Ref Range Status   2024 7.7 (L) 11.7 - 15.7 g/dL Final             " Discharge Instructions and Follow-Up:     Discharge diet: Regular   Discharge activity: Activity as tolerated   Discharge follow-up: In 2 weeks for incision check and 6 weeks for postpartum visit               Discharge Disposition:     Discharged to home       Anneliese Somers MD

## 2024-10-01 NOTE — PLAN OF CARE
Goal Outcome Evaluation:      Plan of Care Reviewed With: patient    Overall Patient Progress: improving    Outcome Evaluation: Patient's vital signs and OB assessments WNL. Ambulating and voiding independently. Pain controlled with scheduled tylenol and ibuprofen. Breastfeeding with minimal assistance from staff. Anticipate discharge today.    Flori Knapp RN on 10/1/2024 at 7:33 AM

## 2024-10-01 NOTE — CONSULTS
GALINA met with pt and pts , Gino, in pts postpartum room for brief SW consult. Their  baby was fussy during SW visit and pt wanting to finish speaking with SW and then work on feeding. Pt reports that she did request help as she had reviewed the information about financial assistance and was wondering how to access this. SW encouraged her to call the number on her bill once it is received. Pt confirms that she has insurance through her work but reports it is not very good coverage. She reports that three weeks before pt was born that Gino lost his job. SW provided support and validation of the difficulty of this. Gino reports he has another job lined up and will start after a bit of time to provide support to pt and their baby at home. In addition to [place] financial assistance, SW discussed other support resources that might be helpful for the family. SW provided [place] income guidelines for pt to review. She reports that they make less money than the guidelines. sW provided [employer] application website and encouraged pt to apply for herself and baby. SW provided parent resources with financial counselor phone number to utilize if she runs into any issues with applying or needs assistance with this. Pt appreciative and reports plan to apply. SW also noted the [facility] phone number and [place] [employer] on the parent resources sheet. SW informed pt she may qualify for [facility] now or when she qualifies for [place]. Pt reports understanding. SW provided pt with food resource packet including [employer] gift card. Pt and Gino deny other SW needs. SW will remain available while pt is in the hospital for further questions or clarifications on resources provided.    FELICIANO Lawson on 10/1/2024 at 11:09 AM

## 2024-10-01 NOTE — PROGRESS NOTES
[facility] RN Care Coordinator Note    Brianna Alarcon  [id number]  1995    Chart reviewed, discharge follow-up plan discussed with bedside RN and patient, needs assessed. Patient requests all follow-up through clinic, declines ordered home visit. Home care referral canceled; [facility] Intake updated. Post-delivery incision check appointment planned in 2 weeks at [facility].     RN Care Coordinator will continue to follow and assist if needed with discharge plan.

## 2024-10-01 NOTE — PLAN OF CARE
D:  Patient desires discharge home.  Discharge orders received and entered by provider.  A:  Discharge instructions reviewed with the patient.  All questions and concerns addressed.  R:  Discharge criteria met.  4 Part ID bands double checked.  [place] discharged in car seat with parents.  The nurse escorted patient to car     .   Problem: Adult Inpatient Plan of Care  Goal: Plan of Care Review  Description: The Plan of Care Review/Shift note should be completed every shift.  The Outcome Evaluation is a brief statement about your assessment that the patient is improving, declining, or no change.  This information will be displayed automatically on your shift  note.  Outcome: Adequate for Care Transition  Flowsheets (Taken 10/1/2024 1312)  Outcome Evaluation: overall improving and adequate for transition to home with baby and spouse today.   Discharge to home AVS discussed with patient, danger signals/warning signs discussed as well, answered questions and verbalized understanding.  Discharge to home goals and criteria met.  V/S and assessment WDL.  Adequate oral food and fluid intake, voiding well and had bowel movements daily.  Up ad natalie independent ambulating in room and independent with breast feeding and latching baby to breast.  Plan of Care Reviewed With:   patient   spouse  Overall Patient Progress: improving  Goal: Absence of Hospital-Acquired Illness or Injury  Intervention: Prevent Skin Injury  Recent Flowsheet Documentation  Taken 10/1/2024 0930 by Beth Quiles RN  Body Position: position changed independently  Skin Protection: adhesive use limited  Intervention: Prevent and Manage VTE (Venous Thromboembolism) Risk  Recent Flowsheet Documentation  Taken 10/1/2024 0930 by Beth Quiles RN  VTE Prevention/Management: SCDs off (sequential compression devices)  Intervention: Prevent Infection  Recent Flowsheet Documentation  Taken 10/1/2024 0930 by Beth Quiles RN  Infection Prevention:   hand hygiene  promoted   rest/sleep promoted   single patient room provided  Goal: Optimal Comfort and Wellbeing  Intervention: Monitor Pain and Promote Comfort  Recent Flowsheet Documentation  Taken 10/1/2024 0930 by Beth Quiles RN  Pain Management Interventions: medication (see MAR)  Intervention: Provide Person-Centered Care  Recent Flowsheet Documentation  Taken 10/1/2024 0930 by Beth Quiles RN  Trust Relationship/Rapport:   care explained   emotional support provided   empathic listening provided   questions encouraged   reassurance provided   thoughts/feelings acknowledged     Problem: Labor  Goal: Stable Fetal Wellbeing  Intervention: Promote and Monitor Fetal Wellbeing  Recent Flowsheet Documentation  Taken 10/1/2024 0930 by Beth Quiles RN  Body Position: position changed independently  Goal: Absence of Infection Signs and Symptoms  Intervention: Prevent or Manage Infection  Recent Flowsheet Documentation  Taken 10/1/2024 0930 by Beth Quiles RN  Infection Prevention:   hand hygiene promoted   rest/sleep promoted   single patient room provided  Infection Management: aseptic technique maintained  Goal: Acceptable Pain Control  Intervention: Support Labor Pain Coping and Management  Recent Flowsheet Documentation  Taken 10/1/2024 0930 by Beth Quiles RN  Sensory Stimulation Regulation:   lighting decreased   care clustered  Goal: Normal Uterine Contraction Pattern  Intervention: Monitor and Manage Uterine Activity  Recent Flowsheet Documentation  Taken 10/1/2024 0930 by Beth Quiles RN  Fluid/Electrolyte Management: fluids provided     Problem: Postpartum ( Delivery)  Goal: Successful Parent Role Transition  Intervention: Support Parent Role Transition  Recent Flowsheet Documentation  Taken 10/1/2024 0930 by Beth Quiles RN  Supportive Measures:   active listening utilized   decision-making supported   problem-solving facilitated   relaxation techniques promoted   self-care encouraged   self-responsibility  promoted   verbalization of feelings encouraged  Parent-Child Attachment Promotion:   caring behavior modeled   cue recognition promoted   face-to-face positioning promoted   interaction encouraged   parent/caregiver presence encouraged   participation in care promoted   positive reinforcement provided   rooming-in promoted   skin-to-skin contact encouraged   strengths emphasized  Goal: Effective Bowel Elimination  Intervention: Enhance Bowel Motility and Elimination  Recent Flowsheet Documentation  Taken 10/1/2024 0930 by Beth Quiles RN  Bowel Motility Enhancement:   ambulation promoted   fluid intake encouraged  Bowel Elimination Promotion:   adequate fluid intake promoted   ambulation promoted  Goal: Fluid and Electrolyte Balance  Intervention: Monitor and Manage Fluid and Electrolyte Balance  Recent Flowsheet Documentation  Taken 10/1/2024 0930 by Beth Quiles RN  Fluid/Electrolyte Management: fluids provided  Goal: Absence of Infection Signs and Symptoms  Intervention: Prevent or Manage Infection  Recent Flowsheet Documentation  Taken 10/1/2024 0930 by Beth Quiles RN  Infection Management: aseptic technique maintained  Goal: Optimal Pain Control and Function  Intervention: Prevent or Manage Pain  Recent Flowsheet Documentation  Taken 10/1/2024 0930 by Beth Quiles RN  Pain Management Interventions: medication (see MAR)  Perineal Care:   absorbent brief/pad changed   perineal spray bottle/warm water use encouraged   perineum cleansed   perineal hygiene encouraged  Goal: Effective Urinary Elimination  Intervention: Monitor and Manage Urinary Retention  Recent Flowsheet Documentation  Taken 10/1/2024 0930 by Beth Quiles RN  Urinary Elimination Promotion: frequent voiding encouraged  Goal: Effective Oxygenation and Ventilation  Intervention: Optimize Oxygenation and Ventilation  Recent Flowsheet Documentation  Taken 10/1/2024 0930 by Beth Quiles RN  Head of Bed (HOB) Positioning: HOB at 30-45 degrees      Problem: Breastfeeding  Goal: Effective Breastfeeding  Intervention: Promote Breast Care and Comfort  Recent Flowsheet Documentation  Taken 10/1/2024 0930 by Beth Quiles RN  Breast Care: Breastfeeding: breast milk to nipples  Breast Care: hand expression utilized  Intervention: Promote Effective Breastfeeding  Recent Flowsheet Documentation  Taken 10/1/2024 0930 by Beth Quiles RN  Breastfeeding Assistance:   assisted with techniques for flat/inverted nipples   feeding cue recognition promoted   feeding on demand promoted   feeding session observed   hand expression verified   infant latch-on verified   infant suck/swallow verified   support offered  Parent-Child Attachment Promotion:   caring behavior modeled   cue recognition promoted   face-to-face positioning promoted   interaction encouraged   parent/caregiver presence encouraged   participation in care promoted   positive reinforcement provided   rooming-in promoted   skin-to-skin contact encouraged   strengths emphasized  Intervention: Support Exclusive Breastfeeding Success  Recent Flowsheet Documentation  Taken 10/1/2024 0930 by Beth Quiles RN  Supportive Measures:   active listening utilized   decision-making supported   problem-solving facilitated   relaxation techniques promoted   self-care encouraged   self-responsibility promoted   verbalization of feelings encouraged  Breastfeeding Support: diary/feeding log utilized   Goal Outcome Evaluation:      Plan of Care Reviewed With: patient, spouse    Overall Patient Progress: improvingOverall Patient Progress: improving    Outcome Evaluation: overall improving and adequate for transition to home with baby and spouse today.   Discharge to home AVS discussed with patient, danger signals/warning signs discussed as well, answered questions and verbalized understanding.  Discharge to home goals and criteria met.  V/S and assessment WDL.  Adequate oral food and fluid intake, voiding well and had bowel  movements daily.  Up ad natalie independent ambulating in room and independent with breast feeding and latching baby to breast.

## 2024-10-01 NOTE — PLAN OF CARE
"Goal Outcome Evaluation:      Plan of Care Reviewed With: patient, spouse    Overall Patient Progress: improvingOverall Patient Progress: improving    Outcome Evaluation: overall improving and adequate for transition to home with baby and spouse today.   Discharge to home AVS discussed with patient, danger signals/warning signs discussed as well, answered questions and verbalized understanding.  Discharge to home goals and criteria met.  V/S and assessment WDL.  Adequate oral food and fluid intake, voiding well and had bowel movements daily.  Up ad natalie independent ambulating in room and independent with breast feeding and latching baby to breast.   helpful and supportive of both mom and baby.      Problem: Adult Inpatient Plan of Care  Goal: Plan of Care Review  Description: The Plan of Care Review/Shift note should be completed every shift.  The Outcome Evaluation is a brief statement about your assessment that the patient is improving, declining, or no change.  This information will be displayed automatically on your shift  note.  Outcome: Adequate for Care Transition  Flowsheets (Taken 10/1/2024 1312)  Outcome Evaluation: overall improving and adequate for transition to home with baby and spouse today.   Discharge to home AVS discussed with patient, danger signals/warning signs discussed as well, answered questions and verbalized understanding.  Discharge to home goals and criteria met.  V/S and assessment WDL.  Adequate oral food and fluid intake, voiding well and had bowel movements daily.  Up ad natalie independent ambulating in room and independent with breast feeding and latching baby to breast.  Plan of Care Reviewed With:   patient   spouse  Overall Patient Progress: improving  Goal: Patient-Specific Goal (Individualized)  Description: You can add care plan individualizations to a care plan. Examples of Individualization might be:  \"Parent requests to be called daily at 9am for status\", \"I have a hard " "time hearing out of my right ear\", or \"Do not touch me to wake me up as it startles  me\".  Outcome: Adequate for Care Transition  Goal: Absence of Hospital-Acquired Illness or Injury  Outcome: Adequate for Care Transition  Intervention: Prevent Skin Injury  Recent Flowsheet Documentation  Taken 10/1/2024 0930 by Beth Quiles RN  Body Position: position changed independently  Skin Protection: adhesive use limited  Intervention: Prevent and Manage VTE (Venous Thromboembolism) Risk  Recent Flowsheet Documentation  Taken 10/1/2024 0930 by Beth Quiles RN  VTE Prevention/Management: SCDs off (sequential compression devices)  Intervention: Prevent Infection  Recent Flowsheet Documentation  Taken 10/1/2024 0930 by Beth Quiles RN  Infection Prevention:   hand hygiene promoted   rest/sleep promoted   single patient room provided  Goal: Optimal Comfort and Wellbeing  Outcome: Adequate for Care Transition  Intervention: Monitor Pain and Promote Comfort  Recent Flowsheet Documentation  Taken 10/1/2024 0930 by Beth Quiles RN  Pain Management Interventions: medication (see MAR)  Intervention: Provide Person-Centered Care  Recent Flowsheet Documentation  Taken 10/1/2024 0930 by Beth Quiles RN  Trust Relationship/Rapport:   care explained   emotional support provided   empathic listening provided   questions encouraged   reassurance provided   thoughts/feelings acknowledged  Goal: Readiness for Transition of Care  Outcome: Adequate for Care Transition     Problem: Postpartum ( Delivery)  Goal: Successful Parent Role Transition  Outcome: Adequate for Care Transition  Intervention: Support Parent Role Transition  Recent Flowsheet Documentation  Taken 10/1/2024 0930 by Beth Quiles RN  Supportive Measures:   active listening utilized   decision-making supported   problem-solving facilitated   relaxation techniques promoted   self-care encouraged   self-responsibility promoted   verbalization of feelings " encouraged  Parent-Child Attachment Promotion:   caring behavior modeled   cue recognition promoted   face-to-face positioning promoted   interaction encouraged   parent/caregiver presence encouraged   participation in care promoted   positive reinforcement provided   rooming-in promoted   skin-to-skin contact encouraged   strengths emphasized  Goal: Hemostasis  Outcome: Adequate for Care Transition  Goal: Effective Bowel Elimination  Outcome: Adequate for Care Transition  Intervention: Enhance Bowel Motility and Elimination  Recent Flowsheet Documentation  Taken 10/1/2024 0930 by Beth Quiles RN  Bowel Motility Enhancement:   ambulation promoted   fluid intake encouraged  Bowel Elimination Promotion:   adequate fluid intake promoted   ambulation promoted  Goal: Fluid and Electrolyte Balance  Outcome: Adequate for Care Transition  Intervention: Monitor and Manage Fluid and Electrolyte Balance  Recent Flowsheet Documentation  Taken 10/1/2024 0930 by Beth Quiles RN  Fluid/Electrolyte Management: fluids provided  Goal: Absence of Infection Signs and Symptoms  Outcome: Adequate for Care Transition  Intervention: Prevent or Manage Infection  Recent Flowsheet Documentation  Taken 10/1/2024 0930 by Beth Quiles RN  Infection Management: aseptic technique maintained  Goal: Anesthesia/Sedation Recovery  Outcome: Adequate for Care Transition  Goal: Optimal Pain Control and Function  Outcome: Adequate for Care Transition  Intervention: Prevent or Manage Pain  Recent Flowsheet Documentation  Taken 10/1/2024 0930 by Beth Quiles RN  Pain Management Interventions: medication (see MAR)  Perineal Care:   absorbent brief/pad changed   perineal spray bottle/warm water use encouraged   perineum cleansed   perineal hygiene encouraged  Goal: Nausea and Vomiting Relief  Outcome: Adequate for Care Transition  Goal: Effective Urinary Elimination  Outcome: Adequate for Care Transition  Intervention: Monitor and Manage Urinary  Retention  Recent Flowsheet Documentation  Taken 10/1/2024 0930 by Beth Quiles RN  Urinary Elimination Promotion: frequent voiding encouraged  Goal: Effective Oxygenation and Ventilation  Outcome: Adequate for Care Transition  Intervention: Optimize Oxygenation and Ventilation  Recent Flowsheet Documentation  Taken 10/1/2024 0930 by Beth Quiles RN  Head of Bed (HOB) Positioning: HOB at 30-45 degrees     Problem: Breastfeeding  Goal: Effective Breastfeeding  Outcome: Adequate for Care Transition  Intervention: Promote Breast Care and Comfort  Recent Flowsheet Documentation  Taken 10/1/2024 0930 by Beth Quiles RN  Breast Care: Breastfeeding: breast milk to nipples  Breast Care: hand expression utilized  Intervention: Promote Effective Breastfeeding  Recent Flowsheet Documentation  Taken 10/1/2024 0930 by Beth Quiles RN  Breastfeeding Assistance:   assisted with techniques for flat/inverted nipples   feeding cue recognition promoted   feeding on demand promoted   feeding session observed   hand expression verified   infant latch-on verified   infant suck/swallow verified   support offered  Parent-Child Attachment Promotion:   caring behavior modeled   cue recognition promoted   face-to-face positioning promoted   interaction encouraged   parent/caregiver presence encouraged   participation in care promoted   positive reinforcement provided   rooming-in promoted   skin-to-skin contact encouraged   strengths emphasized  Intervention: Support Exclusive Breastfeeding Success  Recent Flowsheet Documentation  Taken 10/1/2024 0930 by Beth Quiles RN  Supportive Measures:   active listening utilized   decision-making supported   problem-solving facilitated   relaxation techniques promoted   self-care encouraged   self-responsibility promoted   verbalization of feelings encouraged  Breastfeeding Support: diary/feeding log utilized

## 2024-10-01 NOTE — LACTATION NOTE
This note was copied from a baby's chart.  Lactation follow-up Note:      Hours since Delivery: 3 days old.    Gestational Age at Delivery: 40.5 weeks.    Visit: In the last 24 hours, Tyler has been to breast 11 times, has voided x4, stooled x3, and had a weight loss of 6.03% (since delivery), which is a gain from yesterday. Upon entering room, Tyler breastfeeding on left breast in modified football hold, infant in a rhythmic sucking pattern with many swallows noted. Mother states feedings continue to feel comfortable, and she has no concerns at this time. Mother states she recalls engorgement education provided yesterday. Encouraged mother to use waking techniques to help infant be more efficient during feedings, such as un-swaddling him; mother verbalized understanding. Encouraged mother to let primary RN know if she would like lactation to return for feeding assistance or if questions arise. Mother aware of lactation resources available to her after discharging from hospital.     Plan: Skin-to-skin prior to feedings. Continue breastfeeding on-demand and/or every 2-3 hours. Track feedings and diaper output.     Has Breast Pump for Home: Yes, Sharon.    Education given: Listening & watching for swallows, How do I know if my baby is finished & getting enough, Importance of tracking all feedings and diaper output, and [place] waking techniques.

## 2024-10-02 ENCOUNTER — PATIENT OUTREACH (OUTPATIENT)
Dept: CARE COORDINATION | Facility: CLINIC | Age: 29
End: 2024-10-02
Payer: COMMERCIAL

## 2024-10-02 NOTE — LETTER
[facility] CARE COORDINATION  [address]  [facility] [address]    October 22, 2024    Brianna Alarcon  [address] [place] [address]      Dear Brianna,    I am a clinic care coordinator who works with Radha Justin MD with the [facility]. I have been trying to reach you recently to introduce Clinic Care Coordination. Below is a description of clinic care coordination and how I can further assist you.       The clinic care coordination team is made up of a registered nurse, , financial resource worker and community health worker who understand the health care system. The goal of clinic care coordination is to help you manage your health and improve access to the health care system. Our team works alongside your provider to assist you in determining your health and social needs. We can help you obtain health care and community resources, providing you with necessary information and education. We can work with you through any barriers and develop a care plan that helps coordinate and strengthen the communication between you and your care team.  Our services are voluntary and are offered without charge to you personally.    Please feel free to contact me with any questions or concerns regarding care coordination and what we can offer.      We are focused on providing you with the highest-quality healthcare experience possible.    Sincerely,     Karuna Winchester RN  Clinic Care Coordination  [phone]

## 2024-10-02 NOTE — PROGRESS NOTES
Clinic Care Coordination Contact    Situation: Patient chart reviewed by care coordinator.    Background: Patient admitted 24 to 10/1/24 for pregnancy s/p .  Referral received for CCC to assist with insurance and financial resources not discussed with patient.    Assessment: CCC RN left a detailed VM for patient notifying her of the referral.  Instructed for her to call back Community Healthcare Worker @ [phone] if she indeed wanted to speak with our SW.    Plan/Recommendations: CCC will mail an [facility] [[email or web address]] letter and do no further outreach attempts at this time.

## 2024-10-31 DIAGNOSIS — R12 HEARTBURN DURING PREGNANCY IN SECOND TRIMESTER: ICD-10-CM

## 2024-10-31 DIAGNOSIS — O26.892 HEARTBURN DURING PREGNANCY IN SECOND TRIMESTER: ICD-10-CM

## 2024-10-31 RX ORDER — FAMOTIDINE 20 MG/1
20 TABLET, FILM COATED ORAL 2 TIMES DAILY
Qty: 60 TABLET | Refills: 3 | Status: SHIPPED | OUTPATIENT
Start: 2024-10-31 | End: 2024-11-12

## 2024-11-05 ENCOUNTER — PATIENT OUTREACH (OUTPATIENT)
Dept: CARE COORDINATION | Facility: CLINIC | Age: 29
End: 2024-11-05
Payer: COMMERCIAL

## 2024-11-12 ENCOUNTER — PRENATAL OFFICE VISIT (OUTPATIENT)
Dept: FAMILY MEDICINE | Facility: CLINIC | Age: 29
End: 2024-11-12
Payer: COMMERCIAL

## 2024-11-12 VITALS
TEMPERATURE: 98.7 F | BODY MASS INDEX: 31.26 KG/M2 | DIASTOLIC BLOOD PRESSURE: 77 MMHG | OXYGEN SATURATION: 98 % | HEART RATE: 68 BPM | RESPIRATION RATE: 20 BRPM | HEIGHT: 61 IN | WEIGHT: 165.6 LBS | SYSTOLIC BLOOD PRESSURE: 118 MMHG

## 2024-11-12 DIAGNOSIS — Z98.891 S/P CESAREAN SECTION: ICD-10-CM

## 2024-11-12 DIAGNOSIS — D62 ANEMIA DUE TO BLOOD LOSS, ACUTE: ICD-10-CM

## 2024-11-12 DIAGNOSIS — N83.292 COMPLEX CYST OF LEFT OVARY: ICD-10-CM

## 2024-11-12 PROBLEM — Z34.03 ENCOUNTER FOR SUPERVISION OF NORMAL FIRST PREGNANCY IN THIRD TRIMESTER: Status: RESOLVED | Noted: 2024-03-16 | Resolved: 2024-11-12

## 2024-11-12 PROBLEM — O26.892 HEARTBURN DURING PREGNANCY IN SECOND TRIMESTER: Status: RESOLVED | Noted: 2024-06-27 | Resolved: 2024-11-12

## 2024-11-12 PROBLEM — R12 HEARTBURN DURING PREGNANCY IN SECOND TRIMESTER: Status: RESOLVED | Noted: 2024-06-27 | Resolved: 2024-11-12

## 2024-11-12 PROBLEM — Z36.89 ENCOUNTER FOR TRIAGE IN PREGNANT PATIENT: Status: RESOLVED | Noted: 2024-09-21 | Resolved: 2024-11-12

## 2024-11-12 PROBLEM — N83.209 OVARIAN CYST DURING PREGNANCY IN SECOND TRIMESTER: Status: RESOLVED | Noted: 2024-04-30 | Resolved: 2024-11-12

## 2024-11-12 PROBLEM — O34.82 OVARIAN CYST DURING PREGNANCY IN SECOND TRIMESTER: Status: RESOLVED | Noted: 2024-04-30 | Resolved: 2024-11-12

## 2024-11-12 PROBLEM — R03.0 ELEVATED BP WITHOUT DIAGNOSIS OF HYPERTENSION: Status: RESOLVED | Noted: 2024-09-30 | Resolved: 2024-11-12

## 2024-11-12 PROBLEM — O21.9 VOMITING OR NAUSEA OF PREGNANCY: Status: RESOLVED | Noted: 2019-04-15 | Resolved: 2024-11-12

## 2024-11-12 PROBLEM — Z34.90 PREGNANCY: Status: RESOLVED | Noted: 2024-09-27 | Resolved: 2024-11-12

## 2024-11-12 LAB — HGB BLD-MCNC: 12.7 G/DL (ref 11.7–15.7)

## 2024-11-12 PROCEDURE — 99213 OFFICE O/P EST LOW 20 MIN: CPT | Mod: 25 | Performed by: FAMILY MEDICINE

## 2024-11-12 PROCEDURE — 36415 COLL VENOUS BLD VENIPUNCTURE: CPT | Performed by: FAMILY MEDICINE

## 2024-11-13 NOTE — PROGRESS NOTES
"  Assessment & Plan     Routine postpartum follow-up  Patient is doing very well postpartum.  Her  section scar is doing well, see below for further discussion.  Her mood has been good.  Breast feeding is going reasonably well, she has transitioned to pumping exclusively.  She would like to arrange for Nexplanon for contraception.  - OB HEMOGLOBIN    S/P  section  Having some pain over the left lateral aspect of the incision, which looks very well healed.  Discussed gentle massage over this area to break up fibrous tissue.  - OB HEMOGLOBIN    Anemia due to blood loss, acute  Hemoglobin is back to normal. Can stop oral iron and transition back to her prenatal with iron during breastfeeding.  - OB HEMOGLOBIN    Complex cyst of left ovary  Recommended ultrasound of the left ovary, which had a fairly large cyst prenatally.  - US Pelvic Complete with Transvaginal; Future          BMI  Estimated body mass index is 31.81 kg/m  as calculated from the following:    Height as of this encounter: 1.537 m (5' 0.5\").    Weight as of this encounter: 75.1 kg (165 lb 9.6 oz).             Cain Reed is a 29 year old, presenting for the following health issues:  Postpartum Care (6 weeks PP, intermittent pain on the left side of incision )      2024     3:46 PM   Additional Questions   Roomed by Annika ROGERS LPN     HPI     Patient presents today for a routine postpartum visit.  She delivered a healthy male infant via  section for arrest of descent.  She notes that her incision has healed well, but she has some superficial pain around the left lateral aspect of her incision.  Her mood has been good.  Breast feeding is going well, but she has transitioned to exclusive pumping.  She is pleased with this decision as she will be pumping when she returns to work.  She is undecided about contraception, and so options were discussed in detail today.      Review of Systems  Constitutional, HEENT, " "cardiovascular, pulmonary, gi and gu systems are negative, except as otherwise noted.      Objective    /77   Pulse 68   Temp 98.7  F (37.1  C) (Oral)   Resp 20   Ht 1.537 m (5' 0.5\")   Wt 75.1 kg (165 lb 9.6 oz)   LMP  (LMP Unknown)   SpO2 98%   Breastfeeding No   BMI 31.81 kg/m    Body mass index is 31.81 kg/m .  Physical Exam   GENERAL: alert and no distress  RESP: breathing comfortably, no cough during the visit  ABDOMEN: approximately 2 cm rectus sheath separation, no tenderness; incision well-healed without erythema or drainage  MS: no gross musculoskeletal defects noted, no edema  NEURO: Normal strength and tone, mentation intact and speech normal  PSYCH: mentation appears normal, affect normal/bright    Results for orders placed or performed in visit on 11/12/24   OB HEMOGLOBIN     Status: Normal   Result Value Ref Range    Hemoglobin 12.7 11.7 - 15.7 g/dL           Signed Electronically by: Vale Pringle MD      "

## 2024-11-14 ENCOUNTER — OFFICE VISIT (OUTPATIENT)
Dept: FAMILY MEDICINE | Facility: CLINIC | Age: 29
End: 2024-11-14
Payer: COMMERCIAL

## 2024-11-14 VITALS
TEMPERATURE: 98 F | WEIGHT: 166.4 LBS | OXYGEN SATURATION: 96 % | SYSTOLIC BLOOD PRESSURE: 126 MMHG | BODY MASS INDEX: 31.42 KG/M2 | HEIGHT: 61 IN | DIASTOLIC BLOOD PRESSURE: 83 MMHG | HEART RATE: 73 BPM | RESPIRATION RATE: 14 BRPM

## 2024-11-14 DIAGNOSIS — Z30.017 NEXPLANON INSERTION: Primary | ICD-10-CM

## 2024-11-14 LAB — HCG UR QL: NEGATIVE

## 2024-11-14 PROCEDURE — 81025 URINE PREGNANCY TEST: CPT | Performed by: FAMILY MEDICINE

## 2024-11-14 PROCEDURE — 11981 INSERTION DRUG DLVR IMPLANT: CPT | Performed by: FAMILY MEDICINE

## 2024-11-14 NOTE — PROGRESS NOTES
"  Assessment & Plan     Nexplanon insertion  See below procedure note.  - HCG qualitative urine  - etonogestrel (NEXPLANON) subdermal implant 68 mg          BMI  Estimated body mass index is 31.44 kg/m  as calculated from the following:    Height as of this encounter: 1.549 m (5' 1\").    Weight as of this encounter: 75.5 kg (166 lb 6.4 oz).             Subjective   Brianna is a 29 year old, presenting for the following health issues:  Nexplanon insertion      11/14/2024     7:05 AM   Additional Questions   Roomed by DANAY Hobson CMA([facility])     History of Present Illness       Reason for visit:  Birth control    She eats 4 or more servings of fruits and vegetables daily.She consumes 0 sweetened beverage(s) daily.She exercises with enough effort to increase her heart rate 10 to 19 minutes per day.  She exercises with enough effort to increase her heart rate 3 or less days per week.   She is taking medications regularly.     Patient presents today for insertion of Nexplanon.      Review of Systems  Not done today      Objective    /83   Pulse 73   Temp 98  F (36.7  C) (Oral)   Resp 14   Ht 1.549 m (5' 1\")   Wt 75.5 kg (166 lb 6.4 oz)   LMP  (LMP Unknown)   SpO2 96%   Breastfeeding Yes   BMI 31.44 kg/m    Body mass index is 31.44 kg/m .  Physical Exam   GENERAL: alert and no distress  RESP: Breathing comfortably, no cough during the visit  MS: no gross musculoskeletal defects noted, no edema  NEURO: Normal strength and tone, mentation intact and speech normal  PSYCH: mentation appears normal, affect normal/bright    Results for orders placed or performed in visit on 11/14/24 (from the past 24 hours)   HCG qualitative urine   Result Value Ref Range    hCG Urine Qualitative Negative Negative           Signed Electronically by: Vale Pringle MD  Nexplanon Insertion:    Is a pregnancy test required: Yes.  Was it positive or negative?  Negative  Was a consent obtained?  Yes    Subjective: Brianna Alarcon is a 29 " "year old  presents for Nexplanon.    Patient has been given the opportunity to ask questions about all forms of birth control, including all options appropriate for Brianna Alarcon. Discussed that no method of birth control, except abstinence is 100% effective against pregnancy or sexually transmitted infection.     Brianna Alarcon understands she may have the Nexplanon removed at any time and it should be removed by a health care provider.    The entire insertion procedure was reviewed with the patient, including care after placement.    No LMP recorded (lmp unknown). . No allergy to betadine or shellfish. Patient declines STD screening  hCG Urine Qualitative   Date Value Ref Range Status   2024 Negative Negative Final     Comment:     This test is for screening purposes.  Results should be interpreted along with the clinical picture.  Confirmation testing is available if warranted by ordering IGM200, HCG Quantitative Pregnancy.         /83   Pulse 73   Temp 98  F (36.7  C) (Oral)   Resp 14   Ht 1.549 m (5' 1\")   Wt 75.5 kg (166 lb 6.4 oz)   LMP  (LMP Unknown)   SpO2 96%   Breastfeeding Yes   BMI 31.44 kg/m      PROCEDURE NOTE: -- Nexplanon Insertion    Reason for Insertion: contraception    Patient was placed supine with left arm exposed.  Nomi was made 8-10 cm above medial epicondyle and a guiding nomi 4 cm above the first.  Arm was prepped with Betadine. Insertion point was anesthetized with 2 mL 1% lidocaine. After stretching the skin with thumb and index finger around the insertion site, skin punctured with the tip of the needle inserted at 30 degrees and then lowered to horizontal position. The needle was then advanced to its full length. Applicator was then stabilized and slider was unlocked. Slider was pulled back until it stopped and then removed.    Correct placement of the implant was confirmed by palpation in the patient's arm and visualizing the purple top of the " obturator.   Bandage and pressure dressing applied to insertion site.    EBL: minimal    Complications: none    ASSESSMENT:     ICD-10-CM    1. Nexplanon insertion  Z30.017 HCG qualitative urine     HCG qualitative urine     etonogestrel (NEXPLANON) subdermal implant 68 mg     INSERTION NON-BIODEGRADABLE DRUG DELIVERY IMPLANT             PLAN:    Given 's handouts, including when to have Nexplanon removed, list of danger s/sx, side effects and follow up recommended. Encouraged condom use for prevention of STD. Back up contraception advised for 7 days. Advised to call for any fever, for prolonged or severe pain or bleeding, abnormal vaginal dischage. She was advised to use pain medications (ibuprofen) as needed for mild to moderate pain.     Vale Pringle MD

## 2024-11-19 ENCOUNTER — PATIENT OUTREACH (OUTPATIENT)
Dept: CARE COORDINATION | Facility: CLINIC | Age: 29
End: 2024-11-19
Payer: COMMERCIAL

## 2024-12-05 ENCOUNTER — MEDICAL CORRESPONDENCE (OUTPATIENT)
Dept: HEALTH INFORMATION MANAGEMENT | Facility: CLINIC | Age: 29
End: 2024-12-05
Payer: COMMERCIAL

## 2024-12-05 DIAGNOSIS — R21 RASH OF FACE: Primary | ICD-10-CM

## 2024-12-05 RX ORDER — HYDROCORTISONE 25 MG/G
CREAM TOPICAL 2 TIMES DAILY
Qty: 30 G | Refills: 0 | Status: SHIPPED | OUTPATIENT
Start: 2024-12-05

## 2024-12-05 NOTE — PROGRESS NOTES
Patient presented today with her son for a routine well check.  She had a diffuse hive-like rash on her face that she notes started after use of a new night cream.  She has a history of sensitive skin on her face.  She is wondering what she can use to help clear this rash.  We discussed temporary use of hydrocortisone cream twice daily followed by moisturizer.

## 2024-12-17 ENCOUNTER — HOSPITAL ENCOUNTER (OUTPATIENT)
Dept: ULTRASOUND IMAGING | Facility: CLINIC | Age: 29
Discharge: HOME | End: 2024-12-17
Attending: FAMILY MEDICINE
Payer: COMMERCIAL

## 2024-12-17 DIAGNOSIS — N83.292 COMPLEX CYST OF LEFT OVARY: ICD-10-CM

## 2024-12-17 PROCEDURE — 76856 US EXAM PELVIC COMPLETE: CPT

## 2024-12-17 PROCEDURE — 76856 US EXAM PELVIC COMPLETE: CPT | Mod: 26 | Performed by: RADIOLOGY

## 2024-12-17 PROCEDURE — 76830 TRANSVAGINAL US NON-OB: CPT

## 2024-12-17 PROCEDURE — 76830 TRANSVAGINAL US NON-OB: CPT | Mod: 26 | Performed by: RADIOLOGY

## 2025-01-12 ENCOUNTER — HEALTH MAINTENANCE LETTER (OUTPATIENT)
Age: 30
End: 2025-01-12

## 2025-02-04 ENCOUNTER — MEDICAL CORRESPONDENCE (OUTPATIENT)
Dept: HEALTH INFORMATION MANAGEMENT | Facility: CLINIC | Age: 30
End: 2025-02-04
Payer: COMMERCIAL

## (undated) DEVICE — SU DERMABOND ADVANCED .7ML DNX12

## (undated) DEVICE — SUCTION MANIFOLD NEPTUNE 2 SYS 1 PORT 702-025-000

## (undated) DEVICE — SU VICRYL+ 3-0 CT1 36IN UND VCP944H

## (undated) DEVICE — DRESSING FOAM MEPILEX BORDER AG 12X4 POSTOP 498600

## (undated) DEVICE — TUBE BLOOD LAV 4.0ML EDTA PLSTC

## (undated) DEVICE — SUTURE PDS 0 27IN CT1 + VIOLET PDP340H

## (undated) DEVICE — GLOVE BIOGEL PI ULTRATOUCH G SZ 6.0 42160

## (undated) DEVICE — FETAL PILLOW SILICONE BALLOON DEVICE STERILE LF  FP-010-1

## (undated) DEVICE — GOWN IMPERVIOUS BREATHABLE SMART LG 89015

## (undated) DEVICE — PACK MINOR SINGLE BASIN SSK3001

## (undated) DEVICE — PREP CHLORAPREP 26ML TINTED HI-LITE ORANGE 930815

## (undated) DEVICE — SU CHROMIC 3-0 SH 27" G122H

## (undated) DEVICE — SUTURE MONOCRYL+ 4-0 PS-2 27IN MCP426H

## (undated) DEVICE — SOL WATER IRRIG 1000ML BOTTLE 2F7114

## (undated) DEVICE — SU CHROMIC 1 CT 27" 803H

## (undated) DEVICE — PAD INSERT MED PEACH 14X6.5 62550

## (undated) DEVICE — CUSTOM PACK C-SECTION LHE

## (undated) DEVICE — GLOVE PI ULTRATCH M LF SZ 6.5 PF CUFF TEXT STRL LF 42665

## (undated) DEVICE — SUTURE VICRYL+ 2-0 27IN CT-1 UND VCP259H

## (undated) DEVICE — SOL NACL 0.9% IRRIG 1000ML BOTTLE 2F7124

## (undated) DEVICE — WRAPPER STERILE QC 200 18X18

## (undated) DEVICE — ESU GROUND PAD ADULT REM W/15' CORD E7507DB

## (undated) DEVICE — SU CHROMIC 1 CT-1 27" 813H

## (undated) DEVICE — GLOVE UNDER INDICATOR PI SZ 6 LF 41660

## (undated) DEVICE — DRESSING MEPILEX ADH 4INX10IN STRL LF 496455

## (undated) DEVICE — GLOVE SURG PI ULTRA TOUCH M SZ 7 LF 42670

## (undated) DEVICE — BLADE CLIPPER DISP 4406

## (undated) RX ORDER — FENTANYL CITRATE 50 UG/ML
INJECTION, SOLUTION INTRAMUSCULAR; INTRAVENOUS
Status: DISPENSED
Start: 2024-09-28

## (undated) RX ORDER — MORPHINE SULFATE 1 MG/ML
INJECTION, SOLUTION EPIDURAL; INTRATHECAL; INTRAVENOUS
Status: DISPENSED
Start: 2024-09-28